# Patient Record
Sex: FEMALE | Race: WHITE | NOT HISPANIC OR LATINO | Employment: FULL TIME | URBAN - METROPOLITAN AREA
[De-identification: names, ages, dates, MRNs, and addresses within clinical notes are randomized per-mention and may not be internally consistent; named-entity substitution may affect disease eponyms.]

---

## 2017-01-05 DIAGNOSIS — E11.65 TYPE 2 DIABETES MELLITUS WITH HYPERGLYCEMIA, WITHOUT LONG-TERM CURRENT USE OF INSULIN (H): ICD-10-CM

## 2017-01-05 DIAGNOSIS — I10 HYPERTENSION GOAL BP (BLOOD PRESSURE) < 140/90: Primary | ICD-10-CM

## 2017-01-09 RX ORDER — LISINOPRIL 10 MG/1
10 TABLET ORAL DAILY
Qty: 90 TABLET | Refills: 3 | Status: SHIPPED | OUTPATIENT
Start: 2017-01-09 | End: 2017-11-13

## 2017-03-08 ENCOUNTER — OFFICE VISIT (OUTPATIENT)
Dept: FAMILY MEDICINE | Facility: CLINIC | Age: 57
End: 2017-03-08
Payer: COMMERCIAL

## 2017-03-08 ENCOUNTER — OFFICE VISIT (OUTPATIENT)
Dept: PHARMACY | Facility: CLINIC | Age: 57
End: 2017-03-08
Payer: COMMERCIAL

## 2017-03-08 VITALS
SYSTOLIC BLOOD PRESSURE: 130 MMHG | HEART RATE: 80 BPM | RESPIRATION RATE: 16 BRPM | DIASTOLIC BLOOD PRESSURE: 88 MMHG | TEMPERATURE: 98.2 F | BODY MASS INDEX: 29.58 KG/M2 | WEIGHT: 167 LBS

## 2017-03-08 VITALS
OXYGEN SATURATION: 98 % | WEIGHT: 167 LBS | SYSTOLIC BLOOD PRESSURE: 130 MMHG | HEART RATE: 93 BPM | DIASTOLIC BLOOD PRESSURE: 88 MMHG | BODY MASS INDEX: 29.58 KG/M2

## 2017-03-08 DIAGNOSIS — E78.5 HYPERLIPIDEMIA WITH TARGET LDL LESS THAN 130: ICD-10-CM

## 2017-03-08 DIAGNOSIS — E11.65 TYPE 2 DIABETES MELLITUS WITH HYPERGLYCEMIA, WITHOUT LONG-TERM CURRENT USE OF INSULIN (H): Primary | ICD-10-CM

## 2017-03-08 DIAGNOSIS — R20.9 COLD AND CLAMMY SKIN: ICD-10-CM

## 2017-03-08 DIAGNOSIS — R23.1 COLD AND CLAMMY SKIN: ICD-10-CM

## 2017-03-08 DIAGNOSIS — I10 ESSENTIAL HYPERTENSION WITH GOAL BLOOD PRESSURE LESS THAN 140/90: ICD-10-CM

## 2017-03-08 DIAGNOSIS — F43.23 ADJUSTMENT DISORDER WITH MIXED ANXIETY AND DEPRESSED MOOD: ICD-10-CM

## 2017-03-08 DIAGNOSIS — R07.0 THROAT PAIN: ICD-10-CM

## 2017-03-08 DIAGNOSIS — B34.9 VIRAL SYNDROME: Primary | ICD-10-CM

## 2017-03-08 LAB
DEPRECATED S PYO AG THROAT QL EIA: NORMAL
MICRO REPORT STATUS: NORMAL
SPECIMEN SOURCE: NORMAL

## 2017-03-08 PROCEDURE — 99213 OFFICE O/P EST LOW 20 MIN: CPT | Performed by: NURSE PRACTITIONER

## 2017-03-08 PROCEDURE — 99605 MTMS BY PHARM NP 15 MIN: CPT | Performed by: PHARMACIST

## 2017-03-08 PROCEDURE — 87081 CULTURE SCREEN ONLY: CPT | Performed by: NURSE PRACTITIONER

## 2017-03-08 PROCEDURE — 87880 STREP A ASSAY W/OPTIC: CPT | Performed by: NURSE PRACTITIONER

## 2017-03-08 PROCEDURE — 99607 MTMS BY PHARM ADDL 15 MIN: CPT | Performed by: PHARMACIST

## 2017-03-08 NOTE — LETTER
[address] [address]  [phone]      March 8, 2017      Ameena Dominguezr  [address] [facility] [address]        To whom it may concern,    Due to a medical condition, Ameena missed work 3/6-3/8/2017. She is cleared to return to work tomorrow, 3/9/2017.       Sincerely,        Diane PIERSON CNP

## 2017-03-08 NOTE — PATIENT INSTRUCTIONS
Recommendations from today's MTM visit:                                                        1. Stay on all same medications, ok to use dayqui/nyquil as needed. See martha for consult -illness today -RTW note.         Next MTM visit: Wednesday June 14th AT 10;30am --fasting labs /med review.     To schedule another MTM appointment, please call the clinic directly or you may call the MTM scheduling line at [phone] or toll-free at [phone].     My Clinical Pharmacist's contact information:                                                      It was a pleasure seeing you today!  Please feel free to contact me with any questions or concerns you have.      Yash Ornelas [facility].  Medication Therapy Management Provider  [phone]      You may receive a survey about the MTM services you received.  I would appreciate your feedback to help me serve you better in the future. Please fill it out and return it when you can. Your comments will be anonymous.      My healthcare goals:

## 2017-03-08 NOTE — MR AVS SNAPSHOT
After Visit Summary   3/8/2017    Inger Wegener    MRN: [id number]           Patient Information     Date Of Birth          1960        Visit Information        Provider Department      3/8/2017 11:30 AM Yash Ornelas RPH [facility] MTM        Care Instructions    Recommendations from today's MTM visit:                                                        1. Stay on all same medications, ok to use dayqui/nyquil as needed. See martha for consult -illness today -RTW note.         Next MTM visit: Wednesday June 14th AT 10;30am --fasting labs /med review.     To schedule another MTM appointment, please call the clinic directly or you may call the MTM scheduling line at [phone] or toll-free at [phone].     My Clinical Pharmacist's contact information:                                                      It was a pleasure seeing you today!  Please feel free to contact me with any questions or concerns you have.      Yash Ornelas Rph.  Medication Therapy Management Provider  [phone]      You may receive a survey about the MTM services you received.  I would appreciate your feedback to help me serve you better in the future. Please fill it out and return it when you can. Your comments will be anonymous.      My healthcare goals:                                                                    Follow-ups after your visit        Your next 10 appointments already scheduled     Mar 08, 2017  6:00 PM CST   Office Visit with DANGELO Verduzco CNP   [facility]    [address] [place] [id number] [phone]           Bring a current list of meds and any records pertaining to this visit.  For Physicals, please bring immunization records and any forms needing to be filled out.  Please arrive 10 minutes early to complete paperwork.            Jun 14, 2017 10:30 AM CDT   SHORT with Yash  "TORRI Ornelas   [facility] MTM ([facility])    [address] [place] [address]  [place] [address] [phone]              Who to contact     If you have questions or need follow up information about today's clinic visit or your schedule please contact [facility] directly at [phone].  Normal or non-critical lab and imaging results will be communicated to you by MyChart, letter or phone within 4 business days after the clinic has received the results. If you do not hear from us within 7 days, please contact the clinic through [employer] or phone. If you have a critical or abnormal lab result, we will notify you by phone as soon as possible.  Submit refill requests through [email or web address] or call your pharmacy and they will forward the refill request to us. Please allow 3 business days for your refill to be completed.          Additional Information About Your Visit        [employer] Information     [email or web address] lets you send messages to your doctor, view your test results, renew your prescriptions, schedule appointments and more. To sign up, go to [email or web address] . Click on \"Log in\" on the left side of the screen, which will take you to the Welcome page. Then click on \"Sign up Now\" on the right side of the page.     You will be asked to enter the access code listed below, as well as some personal information. Please follow the directions to create your username and password.     Your access code is: [email or web address]  Expires: 3/29/2017 10:29 AM     Your access code will  in 90 days. If you need help or a new code, please call your [facility] clinic or [phone].        Care EveryWhere ID     This is your Care EveryWhere ID. This could be used by other organizations to access your [facility] medical records  [id number]        Your Vitals Were     Pulse Pulse Oximetry BMI (Body Mass Index)             93 98% 29.58 kg/m2          Blood Pressure from Last 3 " Encounters:   03/08/17 130/88   12/29/16 137/85   08/30/16 142/80    Weight from Last 3 Encounters:   03/08/17 167 lb (75.8 kg)   12/29/16 170 lb (77.1 kg)   08/30/16 170 lb (77.1 kg)              Today, you had the following     No orders found for display       Primary Care Provider Office Phone # Fax #    DANGELO Verduzco [facility] [phone]       [place] [address] [place] [address]        Thank you!     Thank you for choosing [facility]  for your care. Our goal is always to provide you with excellent care. Hearing back from our patients is one way we can continue to improve our services. Please take a few minutes to complete the written survey that you may receive in the mail after your visit with us. Thank you!             Your Updated Medication List - Protect others around you: Learn how to safely use, store and throw away your medicines at www.disposemymeds.org.          This list is accurate as of: 3/8/17 12:03 PM.  Always use your most recent med list.                   Brand Name Dispense Instructions for use    ADVIL PO      Take 400 mg by mouth as needed       ASPIRIN NOT PRESCRIBED    INTENTIONAL     continuous prn for other Reported on 3/8/2017       blood glucose monitoring lancets     3 Box    Use to test blood sugar 2 times daily or as directed.  Ok to substitute alternative if insurance prefers.       blood glucose monitoring test strip    ACCU-CHEK SMARTVIEW    180 each    Use to test blood sugar 2 times daily or as directed.  Ok to substitute alternative if insurance prefers. Profile Rx: patient will contact pharmacy when needed       hydrochlorothiazide 25 MG tablet    HYDRODIURIL    90 tablet    Take 1 tablet (25 mg) by mouth daily       lisinopril 10 MG tablet    PRINIVIL/ZESTRIL    90 tablet    Take 1 tablet (10 mg) by mouth daily       metFORMIN 500 MG 24 hr tablet    GLUCOPHAGE-XR    360 tablet    Take 2 tablets  (1,000 mg) by mouth 2 times daily (with meals)       order for DME     2 Units    Equipment being ordered: Compression stocking, medium compression       simvastatin 20 MG tablet    ZOCOR    90 tablet    Take 1 tablet (20 mg) by mouth At Bedtime       venlafaxine 37.5 MG 24 hr capsule    EFFEXOR-XR    270 capsule    Take 3 capsules (112.5 mg) by mouth daily

## 2017-03-08 NOTE — NURSING NOTE
"Chief Complaint   Patient presents with     URI       Initial /88  Pulse 80  Temp 98.2  F (36.8  C) (Oral)  Resp 16  Wt 167 lb (75.8 kg)  BMI 29.58 kg/m2 Estimated body mass index is 29.58 kg/(m^2) as calculated from the following:    Height as of 12/29/16: 5' 3\" (1.6 m).    Weight as of this encounter: 167 lb (75.8 kg).  Medication Reconciliation: complete       Brooklyn Ying MA     "

## 2017-03-08 NOTE — PROGRESS NOTES
SUBJECTIVE/OBJECTIVE:                                                    Inger Wegener is a 56 year old female coming in for a follow-up visit (16) for Medication Therapy Management.  She was referred to me from Diane, Her PCP.     Chief Complaint:  Has a monster head cold/cough -3 + days now --it feels different - lightheaded --scared her - felt clumsy .    Her question is what otc meds she can take ?  She is taking coricidin , dayquil and nyquil tabs --helps but seems to take longer. She is concerned she lost her appetite.     3 days off work now --she needs a doctor's note to rtw.       Personal Healthcare Goals: Control diabetes with the least amount of medications possible.     Allergies/ADRs: Reviewed in Epic. 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 Had gestational diabetes with her last child.     Medication Adherence: no issues reported by patient    Diabetes:  Pt currently taking Metformin 500 mg er tablets-2 twice a day.  Pt is not experiencing side effects. Had diarrhea when starting metformin but that has resolved.   Patient has a fear of needles. If we want to start considering an injectable, she would like something like trulicity -she fears needles.  Feel weakness before eating.   SMB days =139 n=3, 14 days =126 n=5, 30 days =131 n=7    Date FBG/ 2hours post Lunch/2hours post Dinner /2hours post    3--17  120 346pm     3-6  138 301pm 158 853pm           2-26   137 10pm    2-25  77 2139pm     2-18  93 353pm     2-14   193 10pm -valentines candy              Symptoms of low blood sugar? shaky, dizzy, weak, sweaty. Frequency of hypoglycemia? 1-2 times per week.  Recent symptoms of high blood sugar? none  Eye exam:  due. Last exam was over 1 year ago.   Foot exam: due  Microalbumin is < 30 mg/g. Pt is not taking an ACEi/ARB.  Aspirin: Not taking  Diet/Exercise: Walk her dog every other day, go on bike rides 10 blocks 2 times weely, garden, mow the lawn. Working on eating more protein. Has reduced sugar and carbohydrate intake drastically. Patient has one treat each night of a laughing cow ice cream sandwich (30 grams) at bedtime.       Hypertension. Patient currently taking hydrochlorothiazide 25 mg daily + lisinopril 10mg./day . Pt does not monitor blood pressure at home.     Hyperlipidemia: Current therapy includes simvastatin 20 mg once daily.  Pt reports no significant myalgias or other side effects.   The 10-year ASCVD risk score (Mary Annpeter GARCIA Jr., et al., 2013) is: 6.2%    Values used to calculate the score:      Age: 56 years      Sex: Female      Is Non- : No      Diabetic: Yes      Tobacco smoker: No      Systolic Blood Pressure: 137 mmHg      Is Prescribed Antihypertensives: Yes      HDL Cholesterol: 47 mg/dL      Total Cholesterol: 168 mg/dL    Depression: patient currently taking Effexor 37.5 mg 24 hr capsule- Takes 3 a day=112.5mg..  Doing well.     Headcold/virus?:   Pt. Doesn't feel well last 72 hrs. --feels different --she is worried about taking otc like dayquil and nyquil -- but mtm review her BP and bs's --all wnl today .    She wants a rtw note -- mtm cannot provide that today --she needs nasal swab to rule out flu and possible throat swab to rule out strep .    mtm spoke with her pcp --she will fit her in for appt. At noon today .         Current labs include:  BP Readings from Last 3 Encounters:   03/08/17 130/88   12/29/16 137/85   08/30/16 142/80     Lab Results   Component Value Date    A1C 6.6 12/29/2016    A1C 7.6 05/13/2016    A1C 6.7 09/29/2015    A1C 6.9 04/10/2015    A1C 7.8 01/07/2015     Cholesterol   Date Value Ref Range Status   12/29/2016 168 <200 mg/dL Final   09/29/2015  200 (H) <200 mg/dL Final     Comment:     LDL Cholesterol is the primary guide to therapy.   The NCEP recommends further evaluation of: patients with cholesterol greater   than 200 mg/dL if additional risk factors are present, cholesterol greater   than   240 mg/dL, triglycerides greater than 150 mg/dL, or HDL less than 40 mg/dL.       HDL Cholesterol   Date Value Ref Range Status   12/29/2016 47 (L) >49 mg/dL Final   09/29/2015 49 (L) >50 mg/dL Final     LDL Cholesterol Calculated   Date Value Ref Range Status   12/29/2016 91 <100 mg/dL Final     Comment:     Desirable:       <100 mg/dl   09/29/2015 107 0 - 129 mg/dL Final     Comment:     LDL Cholesterol is the primary guide to therapy: LDL-cholesterol goal in high   risk patients is <100 mg/dL and in very high risk patients is <70 mg/dL.       Triglycerides   Date Value Ref Range Status   12/29/2016 150 (H) <150 mg/dL Final     Comment:     Borderline high:  150-199 mg/dl   High:             200-499 mg/dl   Very high:       >499 mg/dl     09/29/2015 219 (H) 0 - 150 mg/dL Final     Cholesterol/HDL Ratio   Date Value Ref Range Status   09/29/2015 4.1 0.0 - 5.0 Final   09/03/2014 3.7 0.0 - 5.0 Final         Liver Function Studies -   Recent Labs   Lab Test  09/29/15   0748   PROTTOTAL  7.4   ALBUMIN  4.1   BILITOTAL  0.5   ALKPHOS  95   AST  19   ALT  44       Lab Results   Component Value Date    MICROL 7 12/29/2016     No results found for: MICROALBUMIN  Lab Results   Component Value Date    ALBUMIN 4.6 12/29/2016         Last Basic Metabolic Panel:  Lab Results   Component Value Date     12/29/2016      Lab Results   Component Value Date    POTASSIUM 3.4 12/29/2016     Lab Results   Component Value Date    CHLORIDE 100 12/29/2016     Lab Results   Component Value Date    KACY 9.4 12/29/2016     Lab Results   Component Value Date    CO2 28 12/29/2016     Lab Results   Component Value Date    BUN 12 12/29/2016     Lab Results   Component Value Date    CR 0.73  12/29/2016     Lab Results   Component Value Date     12/29/2016       GFR Estimate   Date Value Ref Range Status   12/29/2016 82 >60 mL/min/1.7m2 Final     Comment:     Non  GFR Calc   09/29/2015 75 >60 mL/min/1.7m2 Final     Comment:     Non  GFR Calc   09/10/2014 84 >60 mL/min/1.7m2 Final     Comment:     Non  GFR Calc     GFR Estimate If Black   Date Value Ref Range Status   12/29/2016 >90   GFR Calc   >60 mL/min/1.7m2 Final   09/29/2015 >90   GFR Calc   >60 mL/min/1.7m2 Final   09/10/2014 >90   GFR Calc   >60 mL/min/1.7m2 Final     TSH   Date Value Ref Range Status   05/13/2016 4.11 (H) 0.40 - 4.00 mU/L Final   ]  /88  Pulse 93  Wt 167 lb (75.8 kg)  SpO2 98%  BMI 29.58 kg/m2    Most Recent Immunizations   Administered Date(s) Administered     Influenza Vaccine IM 3yrs+ 4 Valent IIV4 10/01/2016     Influenza Vaccine, 3 YRS +, IM (QUADRIVALENT W/PRESERVATIVES) 09/24/2015     TDAP (ADACEL AGES 11-64) 11/13/2013     Twinrix A/B 12/29/2016     ASSESSMENT:                                                       Current medications were reviewed with her today.     Medication Adherence: no issues identified    Diabetes: Stable. Patient is meeting A1c goal of < 7%.      Hypertension: Stable. Patient is meeting BP goal of < 140/90mmHg.     Depression: Stable    Hyperlipidemia: Stable. Pt is on moderate intensity statin which is indicated based on 2013 ACC/AHA guidelines for lipid management.      Head/cold/virus?:    MTM feels she is ok to take all rx meds and her current otc cough/cold meds as is --also arranged for her to see PCP now today to rule out serious illness/ possible note then to ertw as a  tomorrow.     PLAN:                                                        1. Stay on all same medications, ok to use dayqui/nyquil as needed. See martha for consult -illness today -RTW note.          Next MTM visit: Wednesday June 14th AT 10;30am --fasting labs /med review.     I spent 25 minutes with this patient today.     My Clinical Pharmacist's contact information:                                                      It was a pleasure seeing you today!  Please feel free to contact me with any questions or concerns you have.      Yash Ornelas Rph.  Medication Therapy Management Provider  [phone]      You may receive a survey about the MTM services you received.  I would appreciate your feedback to help me serve you better in the future. Please fill it out and return it when you can. Your comments will be anonymous.

## 2017-03-08 NOTE — MR AVS SNAPSHOT
"              After Visit Summary   3/8/2017    Inger Wegener    MRN: [id number]           Patient Information     Date Of Birth          1960        Visit Information        Provider Department      3/8/2017 6:00 PM Diane Diaz APRN CNP [facility]        Today's Diagnoses     Viral syndrome    -  1    Throat pain           Follow-ups after your visit        Your next 10 appointments already scheduled     Jun 14, 2017 10:30 AM CDT   SHORT with Yash Ornelas RPH   [facility] ([facility])    [address] [place] [id number] [phone]              Who to contact     If you have questions or need follow up information about today's clinic visit or your schedule please contact [facility] directly at [phone].  Normal or non-critical lab and imaging results will be communicated to you by MyChart, letter or phone within 4 business days after the clinic has received the results. If you do not hear from us within 7 days, please contact the clinic through MyChart or phone. If you have a critical or abnormal lab result, we will notify you by phone as soon as possible.  Submit refill requests through [employer] or call your pharmacy and they will forward the refill request to us. Please allow 3 business days for your refill to be completed.          Additional Information About Your Visit        MyChart Information     [employer] lets you send messages to your doctor, view your test results, renew your prescriptions, schedule appointments and more. To sign up, go to [email or web address] . Click on \"Log in\" on the left side of the screen, which will take you to the Welcome page. Then click on \"Sign up Now\" on the right side of the page.     You will be asked to enter the access code listed below, as well as some personal information. Please follow the directions to create your username and password.   "   Your access code is: [email or web address]  Expires: 3/29/2017 11:29 AM     Your access code will  in 90 days. If you need help or a new code, please call your [facility] clinic or [phone].        Care EveryWhere ID     This is your Care EveryWhere ID. This could be used by other organizations to access your [facility] medical records  [id number]        Your Vitals Were     Pulse Temperature Respirations BMI (Body Mass Index)          80 98.2  F (36.8  C) (Oral) 16 29.58 kg/m2         Blood Pressure from Last 3 Encounters:   17 130/88   17 130/88   16 137/85    Weight from Last 3 Encounters:   17 167 lb (75.8 kg)   17 167 lb (75.8 kg)   16 170 lb (77.1 kg)              We Performed the Following     Beta strep group A culture     Rapid strep screen        Primary Care Provider Office Phone # Fax #    DANGELO Verduzco [facility] [phone]       [facility] [address] [place] [address]        Thank you!     Thank you for choosing [facility]  for your care. Our goal is always to provide you with excellent care. Hearing back from our patients is one way we can continue to improve our services. Please take a few minutes to complete the written survey that you may receive in the mail after your visit with us. Thank you!             Your Updated Medication List - Protect others around you: Learn how to safely use, store and throw away your medicines at www.disposemymeds.org.          This list is accurate as of: 3/8/17 11:59 PM.  Always use your most recent med list.                   Brand Name Dispense Instructions for use    ADVIL PO      Take 400 mg by mouth as needed       ASPIRIN NOT PRESCRIBED    INTENTIONAL     continuous prn for other Reported on 3/8/2017       blood glucose monitoring lancets     3 Box    Use to test blood sugar 2 times daily or as directed.  Ok to substitute alternative if insurance prefers.        blood glucose monitoring test strip    ACCU-CHEK SMARTVIEW    180 each    Use to test blood sugar 2 times daily or as directed.  Ok to substitute alternative if insurance prefers. Profile Rx: patient will contact pharmacy when needed       hydrochlorothiazide 25 MG tablet    HYDRODIURIL    90 tablet    Take 1 tablet (25 mg) by mouth daily       lisinopril 10 MG tablet    PRINIVIL/ZESTRIL    90 tablet    Take 1 tablet (10 mg) by mouth daily       metFORMIN 500 MG 24 hr tablet    GLUCOPHAGE-XR    360 tablet    Take 2 tablets (1,000 mg) by mouth 2 times daily (with meals)       order for DME     2 Units    Equipment being ordered: Compression stocking, medium compression       simvastatin 20 MG tablet    ZOCOR    90 tablet    Take 1 tablet (20 mg) by mouth At Bedtime       venlafaxine 37.5 MG 24 hr capsule    EFFEXOR-XR    270 capsule    Take 3 capsules (112.5 mg) by mouth daily

## 2017-03-08 NOTE — PROGRESS NOTES
SUBJECTIVE:                                                    Inger Wegener is a 56 year old female who presents to clinic today for the following health issues:      Acute Illness   Acute illness concerns: Cold and coughing. Sore throat. Energy level in the pits. Body aches.   Onset: 4 days     Fever: no    Chills/Sweats: no    Headache (location?): YES    Sinus Pressure:YES    Conjunctivitis:  no    Ear Pain: no    Rhinorrhea: YES    Congestion: YES    Sore Throat: YES     Cough: YES-productive of yellow sputum    Wheeze: no    Decreased Appetite: YES    Nausea: YES    Vomiting: no    Diarrhea:  no    Dysuria/Freq.: no    Fatigue/Achiness: YES    Sick/Strep Exposure: no     Therapies Tried and outcome: Day quil, Night quil and Tylenol       Problem list and histories reviewed & adjusted, as indicated.  Additional history: as documented    Patient Active Problem List   Diagnosis     CARDIOVASCULAR SCREENING; LDL GOAL LESS THAN 160     Hypertension goal BP (blood pressure) < 140/90     Flying phobia     Hyperlipidemia with target LDL less than 130     Adjustment reaction     Abnormal glucose     Adjustment disorder with mixed anxiety and depressed mood     Closed nondisplaced dome fracture of right talus, sequela     Ankle pain, right     Type 2 diabetes mellitus with hyperglycemia (H)     History reviewed. No pertinent past surgical history.    Social History   Substance Use Topics     Smoking status: Passive Smoke Exposure - Never Smoker     Smokeless tobacco: Never Used     Alcohol use Yes     Family History   Problem Relation Age of Onset     Eye Disorder Mother      Hypertension Mother      Thyroid Disease Mother      Hearing Loss Father      DIABETES Maternal Grandmother          Current Outpatient Prescriptions   Medication Sig Dispense Refill     lisinopril (PRINIVIL/ZESTRIL) 10 MG tablet Take 1 tablet (10 mg) by mouth daily 90 tablet 3     blood glucose monitoring (ACCU-CHEK SMARTVIEW) test strip Use to  test blood sugar 2 times daily or as directed.  Ok to substitute alternative if insurance prefers.  Profile Rx: patient will contact pharmacy when needed 180 each 1     venlafaxine (EFFEXOR-XR) 37.5 MG 24 hr capsule Take 3 capsules (112.5 mg) by mouth daily 270 capsule 3     simvastatin (ZOCOR) 20 MG tablet Take 1 tablet (20 mg) by mouth At Bedtime 90 tablet 3     hydrochlorothiazide (HYDRODIURIL) 25 MG tablet Take 1 tablet (25 mg) by mouth daily 90 tablet 3     metFORMIN (GLUCOPHAGE-XR) 500 MG 24 hr tablet Take 2 tablets (1,000 mg) by mouth 2 times daily (with meals) 360 tablet 3     blood glucose monitoring (ACCU-CHEK FASTCLIX) lancets Use to test blood sugar 2 times daily or as directed.  Ok to substitute alternative if insurance prefers. 3 Box 3     order for DME Equipment being ordered: Compression stocking, medium compression 2 Units 11     ASPIRIN NOT PRESCRIBED (INTENTIONAL) continuous prn for other Reported on 3/8/2017       Ibuprofen (ADVIL PO) Take 400 mg by mouth as needed        Allergies   Allergen Reactions     Erythromycin Nausea     Recent Labs   Lab Test  12/29/16   1044  05/13/16   1551  09/29/15   0748   09/03/14   0800   A1C  6.6*  7.6*  6.7*   < >   --    LDL  91   --   107   --   89   HDL  47*   --   49*   --   47*   TRIG  150*   --   219*   --   190*   ALT  38   --   44   --   48   CR  0.73   --   0.80   < >   --    GFRESTIMATED  82   --   75   < >   --    GFRESTBLACK  >90   GFR Calc     --   >90   GFR Calc     < >   --    POTASSIUM  3.4   --   3.4   < >   --    TSH   --   4.11*   --    --    --     < > = values in this interval not displayed.      BP Readings from Last 3 Encounters:   03/08/17 130/88   03/08/17 130/88   12/29/16 137/85    Wt Readings from Last 3 Encounters:   03/08/17 167 lb (75.8 kg)   03/08/17 167 lb (75.8 kg)   12/29/16 170 lb (77.1 kg)                  Labs reviewed in EPIC    Reviewed and updated as needed this visit by clinical staff        Reviewed and updated as needed this visit by Provider         ROS:  Constitutional, HEENT, cardiovascular, pulmonary, gi and gu systems are negative, except as otherwise noted.    OBJECTIVE:                                                    /88  Pulse 80  Temp 98.2  F (36.8  C) (Oral)  Resp 16  Wt 167 lb (75.8 kg)  BMI 29.58 kg/m2  Body mass index is 29.58 kg/(m^2).  EXAM:  Constitutional: healthy, alert, active and moderate distress, ill  Neck: Neck supple. + adenopathy.  ENT: Bilateral TM's are normal.  Posterior oropharynx is mildly erythemetous.  Nares clear without congestion.  Cardiovascular: negative, PMI normal. No lifts, heaves, or thrills. RRR. No murmurs, clicks gallops or rub, No edema or JVD.  Respiratory: Respirations easy and regular. No respiratory distress. Lungs sounds CTA.  Skin: warm and dry  Psychiatric: mentation appears normal. and affect normal/bright but ill.    Results for orders placed or performed in visit on 03/08/17   Rapid strep screen   Result Value Ref Range    Specimen Description Throat     Rapid Strep A Screen       NEGATIVE: No Group A streptococcal antigen detected by immunoassay, await   culture report.      Micro Report Status FINAL 03/08/2017    Beta strep group A culture   Result Value Ref Range    Specimen Description Throat     Culture Micro No Beta Streptococcus isolated     Micro Report Status FINAL 03/10/2017         ASSESSMENT/PLAN:                                                      (B34.9) Viral syndrome  (primary encounter diagnosis)  Comment:   Plan: the patient was reassured that her RST is negative.  I believe this is a viral illness.  She was appreciative.  We discussed self cares (fluids, diet, rest, etc).  She will return with new or worsening symptoms.    (R07.0) Throat pain  Comment:   Plan: Rapid strep screen, Beta strep group A culture           DANGELO Lockwood [facility]

## 2017-03-10 LAB
BACTERIA SPEC CULT: NORMAL
MICRO REPORT STATUS: NORMAL
SPECIMEN SOURCE: NORMAL

## 2017-03-13 ENCOUNTER — TRANSFERRED RECORDS (OUTPATIENT)
Dept: HEALTH INFORMATION MANAGEMENT | Facility: CLINIC | Age: 57
End: 2017-03-13

## 2017-06-20 ENCOUNTER — OFFICE VISIT (OUTPATIENT)
Dept: PHARMACY | Facility: CLINIC | Age: 57
End: 2017-06-20
Payer: COMMERCIAL

## 2017-06-20 VITALS
WEIGHT: 169.4 LBS | DIASTOLIC BLOOD PRESSURE: 76 MMHG | BODY MASS INDEX: 30.01 KG/M2 | HEART RATE: 76 BPM | SYSTOLIC BLOOD PRESSURE: 120 MMHG

## 2017-06-20 DIAGNOSIS — E11.65 TYPE 2 DIABETES MELLITUS WITH HYPERGLYCEMIA, WITHOUT LONG-TERM CURRENT USE OF INSULIN (H): Primary | ICD-10-CM

## 2017-06-20 DIAGNOSIS — E78.5 HYPERLIPIDEMIA WITH TARGET LDL LESS THAN 130: ICD-10-CM

## 2017-06-20 DIAGNOSIS — F43.23 ADJUSTMENT DISORDER WITH MIXED ANXIETY AND DEPRESSED MOOD: ICD-10-CM

## 2017-06-20 DIAGNOSIS — E55.9 VITAMIN D DEFICIENCY: ICD-10-CM

## 2017-06-20 DIAGNOSIS — E53.8 VITAMIN B12 DEFICIENCY (NON ANEMIC): ICD-10-CM

## 2017-06-20 DIAGNOSIS — E11.65 TYPE 2 DIABETES MELLITUS WITH HYPERGLYCEMIA, WITHOUT LONG-TERM CURRENT USE OF INSULIN (H): ICD-10-CM

## 2017-06-20 DIAGNOSIS — I10 ESSENTIAL HYPERTENSION WITH GOAL BLOOD PRESSURE LESS THAN 140/90: ICD-10-CM

## 2017-06-20 LAB
CHOLEST SERPL-MCNC: 194 MG/DL
DEPRECATED CALCIDIOL+CALCIFEROL SERPL-MC: 35 UG/L (ref 20–75)
HBA1C MFR BLD: 6.7 % (ref 4.3–6)
HDLC SERPL-MCNC: 48 MG/DL
LDLC SERPL CALC-MCNC: 114 MG/DL
NONHDLC SERPL-MCNC: 146 MG/DL
TRIGL SERPL-MCNC: 158 MG/DL
TSH SERPL DL<=0.005 MIU/L-ACNC: 2.97 MU/L (ref 0.4–4)
VIT B12 SERPL-MCNC: 340 PG/ML (ref 193–986)

## 2017-06-20 PROCEDURE — 36415 COLL VENOUS BLD VENIPUNCTURE: CPT | Performed by: NURSE PRACTITIONER

## 2017-06-20 PROCEDURE — 83036 HEMOGLOBIN GLYCOSYLATED A1C: CPT | Performed by: NURSE PRACTITIONER

## 2017-06-20 PROCEDURE — 80061 LIPID PANEL: CPT | Performed by: NURSE PRACTITIONER

## 2017-06-20 PROCEDURE — 84443 ASSAY THYROID STIM HORMONE: CPT | Performed by: NURSE PRACTITIONER

## 2017-06-20 PROCEDURE — 82607 VITAMIN B-12: CPT | Performed by: NURSE PRACTITIONER

## 2017-06-20 PROCEDURE — 99607 MTMS BY PHARM ADDL 15 MIN: CPT | Performed by: PHARMACIST

## 2017-06-20 PROCEDURE — 99606 MTMS BY PHARM EST 15 MIN: CPT | Performed by: PHARMACIST

## 2017-06-20 PROCEDURE — 82306 VITAMIN D 25 HYDROXY: CPT | Performed by: NURSE PRACTITIONER

## 2017-06-20 NOTE — PROGRESS NOTES
SUBJECTIVE/OBJECTIVE:                                                    Inger Wegener is a 57 year old female coming in for a follow-up visit (3-8-17) for Medication Therapy Management.  She was referred to me from Diane, Her PCP.     Chief Complaint: Here for fasting labs.       Personal Healthcare Goals: Control diabetes with the least amount of medications possible.     Allergies/ADRs: Reviewed in Epic. 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 Had gestational diabetes with her last child.     Medication Adherence: no issues reported by patient    Diabetes:  Pt currently taking Metformin 500 mg er tablets-2 twice a day.  Pt is not experiencing side effects. Had diarrhea when starting metformin but that has resolved.   Patient has a fear of needles. If we want to start considering an injectable, she would like something like trulicity -she fears needles.  Feel weakness before eating.   SMBG: no bs meter today - 3 x week , self reports --am fasting s= 130-160, bedtime - 110-190 .       Symptoms of low blood sugar? shaky, dizzy, weak, sweaty. Frequency of hypoglycemia? 1-2 times per week.  Recent symptoms of high blood sugar? none  Eye exam: 3-13-17--had eye exam for dm --all good .   Foot exam: due  Microalbumin is < 30 mg/g. Pt is not taking an ACEi/ARB.  Aspirin: Not taking  Diet/Exercise: Walk her dog every other day, go on bike rides 10 blocks 2 times weely, garden, mow the lawn. Working on eating more protein. Has reduced sugar and carbohydrate intake drastically. Patient has one treat each night of a laughing cow ice cream sandwich (30 grams) at bedtime.       Hypertension. Patient currently taking hydrochlorothiazide 25 mg daily  + lisinopril 10mg./day . Pt does not monitor blood pressure at home.     Hyperlipidemia: Current therapy includes simvastatin 20 mg once daily.  Pt reports no significant myalgias or other side effects.   The 10-year ASCVD risk score (Bloomerypeter GARCIA Jr, et al., 2013) is: 5.3%    Values used to calculate the score:      Age: 57 years      Sex: Female      Is Non- : No      Diabetic: Yes      Tobacco smoker: No      Systolic Blood Pressure: 120 mmHg      Is BP treated: Yes      HDL Cholesterol: 47 mg/dL      Total Cholesterol: 168 mg/dL     Repeat fasting lipids today .    Depression: patient currently taking Effexor 37.5 mg 24 hr capsule- Takes 3 a day=112.5mg..  Doing well.     FYI--Last tsh 4.11--will repeat yearly lab today --she has no hypothyroid symptoms--we discussed what sub clinical hypothyroidism is and will wait for lab result . She does not want any more medication if not warranted.     Vitamin D3: level was 35 on today .    Vitamin B12: level was 390 on today. Vitamin B12 helps support memory and lessens fatigue.            Current labs include:  BP Readings from Last 3 Encounters:   06/20/17 120/76   03/08/17 130/88   03/08/17 130/88     Today's Vitals: /76  Pulse 76  Wt 169 lb 6.4 oz (76.8 kg)  BMI 30.01 kg/m2  Lab Results   Component Value Date    A1C 6.7 06/20/2017   .  Lab Results   Component Value Date    CHOL 194 06/20/2017     Lab Results   Component Value Date    TRIG 158 06/20/2017     Lab Results   Component Value Date    HDL 48 06/20/2017     Lab Results   Component Value Date     06/20/2017       Liver Function Studies -   Recent Labs   Lab Test  12/29/16   1044   PROTTOTAL  7.5   ALBUMIN  4.6   BILITOTAL  0.4   ALKPHOS  87   AST  17   ALT  38       Lab Results   Component Value Date    UCRR 68 12/29/2016    MICROL 7 12/29/2016    UMALCR 9.82 12/29/2016       Last Basic Metabolic Panel:  Lab Results   Component Value Date     12/29/2016      Lab Results    Component Value Date    POTASSIUM 3.4 12/29/2016     Lab Results   Component Value Date    CHLORIDE 100 12/29/2016     Lab Results   Component Value Date    BUN 12 12/29/2016     Lab Results   Component Value Date    CR 0.73 12/29/2016     GFR Estimate   Date Value Ref Range Status   12/29/2016 82 >60 mL/min/1.7m2 Final     Comment:     Non  GFR Calc   09/29/2015 75 >60 mL/min/1.7m2 Final     Comment:     Non  GFR Calc   09/10/2014 84 >60 mL/min/1.7m2 Final     Comment:     Non  GFR Calc     GFR Estimate If Black   Date Value Ref Range Status   12/29/2016 >90   GFR Calc   >60 mL/min/1.7m2 Final   09/29/2015 >90   GFR Calc   >60 mL/min/1.7m2 Final   09/10/2014 >90   GFR Calc   >60 mL/min/1.7m2 Final     TSH   Date Value Ref Range Status   06/20/2017 2.97 0.40 - 4.00 mU/L Final   ]    Most Recent Immunizations   Administered Date(s) Administered     Influenza Vaccine IM 3yrs+ 4 Valent IIV4 10/01/2016     Influenza Vaccine, 3 YRS +, IM (QUADRIVALENT W/PRESERVATIVES) 09/24/2015     TDAP Vaccine (Adacel) 11/13/2013     Twinrix A/B 12/29/2016               ASSESSMENT:                                                       Current medications were reviewed with her today.     Medication Adherence: no issues identified    Diabetes: Stable. Patient is meeting A1c goal of < 7%.      Hypertension: Stable. Patient is meeting BP goal of < 140/90mmHg.     Depression: Stable    Hyperlipidemia: Stable. Pt is on moderate intensity statin which is indicated based on 2013 ACC/AHA guidelines for lipid management.      Vitamin D3: is not at goal of 50-75ng/mL. Pt would benefit from get sunshine this summer --repeat lab in December.  Vitamin B12: is not at goal of 600-1000pg/mL. Pt would benefit from vitamin B12 lab recheck in 6 months.        PLAN:                                                      1. A1c today = 6.7% --excellent !   Keep  up good work --watch carbs in th diet , stay active .    Other lab results --I will mail you a letter .        Next MTM visit: 6 months -Tuesday December 19th at 4 pm.  Bring blood sugar meter .       I spent 25 minutes with this patient today.     My Clinical Pharmacist's contact information:                                                      It was a pleasure seeing you today!  Please feel free to contact me with any questions or concerns you have.      Yash Ornelas Rph.  Medication Therapy Management Provider  [phone]      You may receive a survey about the [facility] services you received.  I would appreciate your feedback to help me serve you better in the future. Please fill it out and return it when you can. Your comments will be anonymous.

## 2017-06-20 NOTE — PATIENT INSTRUCTIONS
Recommendations from today's MTM visit:                                                        1. A1c today = 6.7% --excellent !   Keep up good work --watch carbs in th diet , stay active .    Other lab results --I will mail you a letter .        Next MTM visit: 6 months -Tuesday December 19th at 4 pm.  Bring blood sugar meter .     To schedule another MTM appointment, please call the clinic directly or you may call the MTM scheduling line at [phone] or toll-free at [phone].     My Clinical Pharmacist's contact information:                                                      It was a pleasure seeing you today!  Please feel free to contact me with any questions or concerns you have.      Yash Ornelas [facility].  Medication Therapy Management Provider  [phone]      You may receive a survey about the MTM services you received.  I would appreciate your feedback to help me serve you better in the future. Please fill it out and return it when you can. Your comments will be anonymous.      My healthcare goals:

## 2017-06-20 NOTE — MR AVS SNAPSHOT
After Visit Summary   6/20/2017    Inger Wegener    MRN: [id number]           Patient Information     Date Of Birth          1960        Visit Information        Provider Department      6/20/2017 9:30 AM Yash Ornelas RPH [facility] MTM        Today's Diagnoses     Type 2 diabetes mellitus with hyperglycemia, without long-term current use of insulin (H)    -  1    Hyperlipidemia with target LDL less than 130        Vitamin D deficiency        Vitamin B12 deficiency (non anemic)          Care Instructions    Recommendations from today's MTM visit:                                                        1. A1c today = 6.7% --excellent !   Keep up good work --watch carbs in th diet , stay active .    Other lab results --I will mail you a letter .        Next MTM visit: 6 months -Tuesday December 19th at 4 pm.  Bring blood sugar meter .     To schedule another MT appointment, please call the clinic directly or you may call the MTM scheduling line at [phone] or toll-free at [phone].     My Clinical Pharmacist's contact information:                                                      It was a pleasure seeing you today!  Please feel free to contact me with any questions or concerns you have.      Yash Ornelas Rph.  Medication Therapy Management Provider  [phone]      You may receive a survey about the MT services you received.  I would appreciate your feedback to help me serve you better in the future. Please fill it out and return it when you can. Your comments will be anonymous.      My healthcare goals:                                                                      Follow-ups after your visit        Your next 10 appointments already scheduled     Jun 20, 2017 11:00 AM CDT   LAB with [facility]   [facility] ([facility])    [address] [place] [id number] [phone]           Patient must bring picture  "ID.  Patient should be prepared to give a urine specimen  Please do not eat 10-12 hours before your appointment if you are coming in fasting for labs on lipids, cholesterol, or glucose (sugar).  Pregnant women should follow their Care Team instructions. Water with medications is okay. Do not drink coffee or other fluids.   If you have concerns about taking  your medications, please ask at office or if scheduling via [facility], send a message by clicking on Secure Messaging, Message Your Care Team.            Dec 19, 2017  4:00 PM CST   SHORT with Yash Ornelas [facility] ([facility])    [address] [place] [address] [phone]              Who to contact     If you have questions or need follow up information about today's clinic visit or your schedule please contact [facility] directly at [phone].  Normal or non-critical lab and imaging results will be communicated to you by MyChart, letter or phone within 4 business days after the clinic has received the results. If you do not hear from us within 7 days, please contact the clinic through [facility] or phone. If you have a critical or abnormal lab result, we will notify you by phone as soon as possible.  Submit refill requests through [facility] or call your pharmacy and they will forward the refill request to us. Please allow 3 business days for your refill to be completed.          Additional Information About Your Visit        myBarrister Information     myBarrister lets you send messages to your doctor, view your test results, renew your prescriptions, schedule appointments and more. To sign up, go to [email or web address] . Click on \"Log in\" on the left side of the screen, which will take you to the Welcome page. Then click on \"Sign up Now\" on the right side of the page.     You will be asked to enter the access code listed below, as well as some personal information. " Please follow the directions to create your username and password.     Your access code is: [email or web address]  Expires: 2017 10:14 AM     Your access code will  in 90 days. If you need help or a new code, please call your [facility] clinic or [phone].        Care EveryWhere ID     This is your Care EveryWhere ID. This could be used by other organizations to access your [facility] medical records  [id number]        Your Vitals Were     Pulse BMI (Body Mass Index)                76 30.01 kg/m2           Blood Pressure from Last 3 Encounters:   17 120/76   17 130/88   17 130/88    Weight from Last 3 Encounters:   17 169 lb 6.4 oz (76.8 kg)   17 167 lb (75.8 kg)   17 167 lb (75.8 kg)               Primary Care Provider Office Phone # Fax #    DANGELO Verduzco [facility] [phone]       [facility] [address] [place] [address]        Thank you!     Thank you for choosing [facility] [place]note 2[place]  for your care. Our goal is always to provide you with excellent care. Hearing back from our patients is one way we can continue to improve our services. Please take a few minutes to complete the written survey that you may receive in the mail after your visit with us. Thank you!             Your Updated Medication List - Protect others around you: Learn how to safely use, store and throw away your medicines at www.disposemymeds.org.          This list is accurate as of: 17 10:14 AM.  Always use your most recent med list.                   Brand Name Dispense Instructions for use    ADVIL PO      Take 400 mg by mouth as needed       ASPIRIN NOT PRESCRIBED    INTENTIONAL     continuous prn for other Reported on 3/8/2017       blood glucose monitoring lancets     3 Box    Use to test blood sugar 2 times daily or as directed.  Ok to substitute alternative if insurance prefers.       blood glucose monitoring test strip     ACCU-CHEK SMARTVIEW    180 each    Use to test blood sugar 2 times daily or as directed.  Ok to substitute alternative if insurance prefers. Profile Rx: patient will contact pharmacy when needed       hydrochlorothiazide 25 MG tablet    HYDRODIURIL    90 tablet    Take 1 tablet (25 mg) by mouth daily       lisinopril 10 MG tablet    PRINIVIL/ZESTRIL    90 tablet    Take 1 tablet (10 mg) by mouth daily       metFORMIN 500 MG 24 hr tablet    GLUCOPHAGE-XR    360 tablet    Take 2 tablets (1,000 mg) by mouth 2 times daily (with meals)       order for DME     2 Units    Equipment being ordered: Compression stocking, medium compression       simvastatin 20 MG tablet    ZOCOR    90 tablet    Take 1 tablet (20 mg) by mouth At Bedtime       venlafaxine 37.5 MG 24 hr capsule    EFFEXOR-XR    270 capsule    Take 3 capsules (112.5 mg) by mouth daily

## 2017-06-27 DIAGNOSIS — E11.65 TYPE 2 DIABETES MELLITUS WITH HYPERGLYCEMIA (H): ICD-10-CM

## 2017-06-28 NOTE — TELEPHONE ENCOUNTER
Medication Detail      Disp Refills Start End RUFINO   metFORMIN (GLUCOPHAGE-XR) 500 MG 24 hr tablet 360 tablet 3 12/29/2016  No   Sig: Take 2 tablets (1,000 mg) by mouth 2 times daily (with meals)               Last Office Visit with G, P or [facility] prescribing provider:  3/8/2017       BP Readings from Last 3 Encounters:   06/20/17 120/76   03/08/17 130/88   03/08/17 130/88     Lab Results   Component Value Date    MICROL 7 12/29/2016     Lab Results   Component Value Date    UMALCR 9.82 12/29/2016     Creatinine   Date Value Ref Range Status   12/29/2016 0.73 0.52 - 1.04 mg/dL Final   ]  GFR Estimate   Date Value Ref Range Status   12/29/2016 82 >60 mL/min/1.7m2 Final     Comment:     Non  GFR Calc   09/29/2015 75 >60 mL/min/1.7m2 Final     Comment:     Non  GFR Calc   09/10/2014 84 >60 mL/min/1.7m2 Final     Comment:     Non  GFR Calc     GFR Estimate If Black   Date Value Ref Range Status   12/29/2016 >90   GFR Calc   >60 mL/min/1.7m2 Final   09/29/2015 >90   GFR Calc   >60 mL/min/1.7m2 Final   09/10/2014 >90   GFR Calc   >60 mL/min/1.7m2 Final     Lab Results   Component Value Date    CHOL 194 06/20/2017     Lab Results   Component Value Date    HDL 48 06/20/2017     Lab Results   Component Value Date     06/20/2017     Lab Results   Component Value Date    TRIG 158 06/20/2017     Lab Results   Component Value Date    CHOLHDLRATIO 4.1 09/29/2015     Lab Results   Component Value Date    AST 17 12/29/2016     Lab Results   Component Value Date    ALT 38 12/29/2016     Lab Results   Component Value Date    A1C 6.7 06/20/2017    A1C 6.6 12/29/2016    A1C 7.6 05/13/2016    A1C 6.7 09/29/2015    A1C 6.9 04/10/2015     Potassium   Date Value Ref Range Status   12/29/2016 3.4 3.4 - 5.3 mmol/L Final

## 2017-06-29 RX ORDER — METFORMIN HCL 500 MG
TABLET, EXTENDED RELEASE 24 HR ORAL
Qty: 360 TABLET | Refills: 0 | OUTPATIENT
Start: 2017-06-29

## 2017-11-10 DIAGNOSIS — E11.65 TYPE 2 DIABETES MELLITUS WITH HYPERGLYCEMIA, WITHOUT LONG-TERM CURRENT USE OF INSULIN (H): ICD-10-CM

## 2017-11-13 ENCOUNTER — OFFICE VISIT (OUTPATIENT)
Dept: URGENT CARE | Facility: URGENT CARE | Age: 57
End: 2017-11-13
Payer: COMMERCIAL

## 2017-11-13 VITALS
HEIGHT: 62 IN | BODY MASS INDEX: 31.1 KG/M2 | TEMPERATURE: 98.3 F | WEIGHT: 169 LBS | OXYGEN SATURATION: 99 % | SYSTOLIC BLOOD PRESSURE: 122 MMHG | DIASTOLIC BLOOD PRESSURE: 60 MMHG | HEART RATE: 100 BPM

## 2017-11-13 DIAGNOSIS — J01.90 ACUTE SINUSITIS WITH SYMPTOMS > 10 DAYS: Primary | ICD-10-CM

## 2017-11-13 DIAGNOSIS — E11.65 TYPE 2 DIABETES MELLITUS WITH HYPERGLYCEMIA, WITHOUT LONG-TERM CURRENT USE OF INSULIN (H): ICD-10-CM

## 2017-11-13 PROCEDURE — 99213 OFFICE O/P EST LOW 20 MIN: CPT | Performed by: INTERNAL MEDICINE

## 2017-11-13 RX ORDER — FLUTICASONE PROPIONATE 50 MCG
1-2 SPRAY, SUSPENSION (ML) NASAL DAILY
Qty: 1 BOTTLE | Refills: 0 | Status: SHIPPED | OUTPATIENT
Start: 2017-11-13 | End: 2017-11-14

## 2017-11-13 RX ORDER — METFORMIN HCL 500 MG
TABLET, EXTENDED RELEASE 24 HR ORAL
Qty: 120 TABLET | Refills: 0 | Status: SHIPPED | OUTPATIENT
Start: 2017-11-13 | End: 2017-11-13

## 2017-11-13 ASSESSMENT — ENCOUNTER SYMPTOMS
FEVER: 0
CHILLS: 0
SORE THROAT: 0
SHORTNESS OF BREATH: 0

## 2017-11-13 NOTE — MR AVS SNAPSHOT
After Visit Summary   11/13/2017    Inger Wegener    MRN: [id number]           Patient Information     Date Of Birth          1960        Visit Information        Provider Department      11/13/2017 7:30 PM Maritza Garcia MD [facility]        Today's Diagnoses     Acute sinusitis with symptoms > 10 days    -  1       Follow-ups after your visit        Your next 10 appointments already scheduled     Dec 19, 2017  4:00 PM CST   SHORT with Yash Ornelas RPH   [facility] ([facility])    [address] [place] [id number] [phone]              Who to contact     If you have questions or need follow up information about today's clinic visit or your schedule please contact [facility] directly at [phone].  Normal or non-critical lab and imaging results will be communicated to you by MyChart, letter or phone within 4 business days after the clinic has received the results. If you do not hear from us within 7 days, please contact the clinic through MyChart or phone. If you have a critical or abnormal lab result, we will notify you by phone as soon as possible.  Submit refill requests through [employer] or call your pharmacy and they will forward the refill request to us. Please allow 3 business days for your refill to be completed.          Additional Information About Your Visit        MyChart Information     [employer] gives you secure access to your electronic health record. If you see a primary care provider, you can also send messages to your care team and make appointments. If you have questions, please call your primary care clinic.  If you do not have a primary care provider, please call [phone] and they will assist you.        Care EveryWhere ID     This is your Care EveryWhere ID. This could be used by other organizations to access your [facility]  "records  [id number]        Your Vitals Were     Pulse Temperature Height Pulse Oximetry BMI (Body Mass Index)       100 98.3  F (36.8  C) (Oral) 5' 2\" (1.575 m) 99% 30.91 kg/m2        Blood Pressure from Last 3 Encounters:   11/13/17 122/60   06/20/17 120/76   03/08/17 130/88    Weight from Last 3 Encounters:   11/13/17 169 lb (76.7 kg)   06/20/17 169 lb 6.4 oz (76.8 kg)   03/08/17 167 lb (75.8 kg)              Today, you had the following     No orders found for display         Today's Medication Changes          These changes are accurate as of: 11/13/17  8:14 PM.  If you have any questions, ask your nurse or doctor.               Start taking these medicines.        Dose/Directions    amoxicillin-clavulanate 875-125 MG per tablet   Commonly known as:  AUGMENTIN   Used for:  Acute sinusitis with symptoms > 10 days   Started by:  Maritza Garcia MD        Dose:  1 tablet   Take 1 tablet by mouth 2 times daily   Quantity:  20 tablet   Refills:  0       fluticasone 50 MCG/ACT spray   Commonly known as:  FLONASE   Used for:  Acute sinusitis with symptoms > 10 days   Started by:  Maritza Garcia MD        Dose:  1-2 spray   Spray 1-2 sprays into both nostrils daily   Quantity:  1 Bottle   Refills:  0         These medicines have changed or have updated prescriptions.        Dose/Directions    metFORMIN 500 MG 24 hr tablet   Commonly known as:  GLUCOPHAGE-XR   This may have changed:  Another medication with the same name was removed. Continue taking this medication, and follow the directions you see here.   Used for:  Type 2 diabetes mellitus with hyperglycemia, without long-term current use of insulin (H)   Changed by:  Diane Diaz APRN CNP        TAKE 2 TABLETS(1000 MG) BY MOUTH TWICE DAILY WITH MEALS   Quantity:  120 tablet   Refills:  0         Stop taking these medicines if you haven't already. Please contact your care team if you have questions.     lisinopril 10 MG tablet   Commonly " known as:  PRINIVIL/ZESTRIL   Stopped by:  Maritza Garcia MD                Where to get your medicines      These medications were sent to [id number] - [place], [place] - [address] AT [facility] [address]  [address], [place] [address]    Hours:  24-hours Phone:  [phone]     amoxicillin-clavulanate 875-125 MG per tablet    fluticasone 50 MCG/ACT spray                Primary Care Provider Office Phone # Fax #    DANGELO Verduzco [phone] [address] [place] [address]        Equal Access to Services     MARIBEL HOOD AH: Hadii leslie ku hadasho Soomaali, waaxda luqadaha, qaybta kaalmada adeegyada, waxay idiin haypoorniman [facility] . So [facility] [phone].    ATENCIÓN: Si habla español, tiene a bullard disposición servicios gratuitos de asistencia lingüística. [facility] [phone].    We comply with applicable federal civil rights laws and [place] laws. We do not discriminate on the basis of race, color, national origin, age, disability, sex, sexual orientation, or gender identity.            Thank you!     Thank you for choosing [facility]  for your care. Our goal is always to provide you with excellent care. Hearing back from our patients is one way we can continue to improve our services. Please take a few minutes to complete the written survey that you may receive in the mail after your visit with us. Thank you!             Your Updated Medication List - Protect others around you: Learn how to safely use, store and throw away your medicines at www.disposemymeds.org.          This list is accurate as of: 11/13/17  8:14 PM.  Always use your most recent med list.                   Brand Name Dispense Instructions for use Diagnosis    ADVIL PO      Take 400 mg by mouth as needed        amoxicillin-clavulanate 875-125 MG per tablet    AUGMENTIN    20 tablet    Take 1 tablet by mouth 2  times daily    Acute sinusitis with symptoms > 10 days       ASPIRIN NOT PRESCRIBED    INTENTIONAL     continuous prn for other Reported on 3/8/2017        blood glucose monitoring lancets     3 Box    Use to test blood sugar 2 times daily or as directed.  Ok to substitute alternative if insurance prefers.    Type 2 diabetes mellitus with hyperglycemia, without long-term current use of insulin (H)       blood glucose monitoring test strip    ACCU-CHEK SMARTVIEW    180 each    Use to test blood sugar 2 times daily or as directed.  Ok to substitute alternative if insurance prefers. Profile Rx: patient will contact pharmacy when needed    Type 2 diabetes mellitus with hyperglycemia, without long-term current use of insulin (H)       fluticasone 50 MCG/ACT spray    FLONASE    1 Bottle    Spray 1-2 sprays into both nostrils daily    Acute sinusitis with symptoms > 10 days       hydrochlorothiazide 25 MG tablet    HYDRODIURIL    90 tablet    Take 1 tablet (25 mg) by mouth daily    Hypertension goal BP (blood pressure) < 140/90       metFORMIN 500 MG 24 hr tablet    GLUCOPHAGE-XR    120 tablet    TAKE 2 TABLETS(1000 MG) BY MOUTH TWICE DAILY WITH MEALS    Type 2 diabetes mellitus with hyperglycemia, without long-term current use of insulin (H)       order for DME     2 Units    Equipment being ordered: Compression stocking, medium compression    Type 2 diabetes mellitus with hyperglycemia, without long-term current use of insulin (H)       simvastatin 20 MG tablet    ZOCOR    90 tablet    Take 1 tablet (20 mg) by mouth At Bedtime    Hyperlipidemia with target LDL less than 130       venlafaxine 37.5 MG 24 hr capsule    EFFEXOR-XR    270 capsule    Take 3 capsules (112.5 mg) by mouth daily    Adjustment disorder with mixed anxiety and depressed mood

## 2017-11-14 DIAGNOSIS — J01.90 ACUTE SINUSITIS WITH SYMPTOMS > 10 DAYS: ICD-10-CM

## 2017-11-14 NOTE — NURSING NOTE
"Inger Wegener;   Chief Complaint   Patient presents with     Sinus Problem     c/o head congestion - onset 2 weeks ago, facial pressure, yellow drainage, prior to that 2-3 weeks of drainage and post nasal drainage      Urgent Care     Initial /60 (BP Location: Left arm, Patient Position: Chair, Cuff Size: Adult Regular)  Pulse 100  Temp 98.3  F (36.8  C) (Oral)  Ht 5' 2\" (1.575 m)  Wt 169 lb (76.7 kg)  SpO2 99%  BMI 30.91 kg/m2 Estimated body mass index is 30.91 kg/(m^2) as calculated from the following:    Height as of this encounter: 5' 2\" (1.575 m).    Weight as of this encounter: 169 lb (76.7 kg)..  BP completed using cuff size regular.  Tiffanie Mae R.N.  "

## 2017-11-14 NOTE — PROGRESS NOTES
SUBJECTIVE:   Inger Wegener is a 57 year old female presenting with a chief complaint of   Chief Complaint   Patient presents with     Sinus Problem     c/o head congestion - onset 2 weeks ago, facial pressure, yellow drainage, prior to that 2-3 weeks of drainage and post nasal drainage      Urgent Care   .    Onset of symptoms was 4+ week(s) ago.  First cough then sinus pain for 2 weeks  Course of illness is worsening.      Current and Associated symptoms: cough - productive and facial pain/pressure  Unending supply of nasal mucous  Treatment measures tried include OTC Cough med.  Predisposing factors include ill contact: School.  Special ed    Review of Systems   Constitutional: Negative for chills and fever.   HENT: Positive for congestion. Negative for ear pain and sore throat.    Respiratory: Negative for shortness of breath.          No past medical history on file.  Current Outpatient Prescriptions   Medication Sig Dispense Refill     metFORMIN (GLUCOPHAGE-XR) 500 MG 24 hr tablet TAKE 2 TABLETS(1000 MG) BY MOUTH TWICE DAILY WITH MEALS 120 tablet 0     amoxicillin-clavulanate (AUGMENTIN) 875-125 MG per tablet Take 1 tablet by mouth 2 times daily 20 tablet 0     fluticasone (FLONASE) 50 MCG/ACT spray Spray 1-2 sprays into both nostrils daily 1 Bottle 0     blood glucose monitoring (ACCU-CHEK SMARTVIEW) test strip Use to test blood sugar 2 times daily or as directed.  Ok to substitute alternative if insurance prefers.  Profile Rx: patient will contact pharmacy when needed 180 each 1     venlafaxine (EFFEXOR-XR) 37.5 MG 24 hr capsule Take 3 capsules (112.5 mg) by mouth daily 270 capsule 3     simvastatin (ZOCOR) 20 MG tablet Take 1 tablet (20 mg) by mouth At Bedtime 90 tablet 3     hydrochlorothiazide (HYDRODIURIL) 25 MG tablet Take 1 tablet (25 mg) by mouth daily 90 tablet 3     blood glucose monitoring (ACCU-CHEK FASTCLIX) lancets Use to test blood sugar 2 times daily or as directed.  Ok to substitute  "alternative if insurance prefers. 3 Box 3     order for DME Equipment being ordered: Compression stocking, medium compression 2 Units 11     Ibuprofen (ADVIL PO) Take 400 mg by mouth as needed        [DISCONTINUED] metFORMIN (GLUCOPHAGE-XR) 500 MG 24 hr tablet Take 2 tablets (1,000 mg) by mouth 2 times daily (with meals) 360 tablet 3     ASPIRIN NOT PRESCRIBED (INTENTIONAL) continuous prn for other Reported on 3/8/2017       Social History   Substance Use Topics     Smoking status: Passive Smoke Exposure - Never Smoker     Smokeless tobacco: Never Used     Alcohol use Yes       OBJECTIVE  /60 (BP Location: Left arm, Patient Position: Chair, Cuff Size: Adult Regular)  Pulse 100  Temp 98.3  F (36.8  C) (Oral)  Ht 5' 2\" (1.575 m)  Wt 169 lb (76.7 kg)  SpO2 99%  BMI 30.91 kg/m2    Physical Exam   Constitutional: She is well-developed, well-nourished, and in no distress.   HENT:   Mouth/Throat: No oropharyngeal exudate.   tympanic membrane clear  Nares congested  OP - normal   Maxillary tenderness bilateral    Cardiovascular: Normal rate, regular rhythm and normal heart sounds.    Pulmonary/Chest: Effort normal and breath sounds normal.   Lymphadenopathy:     She has no cervical adenopathy.       Labs:  No results found for this or any previous visit (from the past 24 hour(s)).        ASSESSMENT:      ICD-10-CM    1. Acute sinusitis with symptoms > 10 days J01.90 amoxicillin-clavulanate (AUGMENTIN) 875-125 MG per tablet     fluticasone (FLONASE) 50 MCG/ACT spray      Handout on sinusitis  Medical Decision Making:    PLAN:  Saline gargles, Saline nasal spray, Vaporizer and   augmentin   flonase    Call or return to clinic if symptoms worsen or fail to improve as anticipated.    "

## 2017-11-15 RX ORDER — METFORMIN HCL 500 MG
TABLET, EXTENDED RELEASE 24 HR ORAL
Qty: 360 TABLET | Refills: 0 | Status: SHIPPED | OUTPATIENT
Start: 2017-11-15 | End: 2018-03-09

## 2017-11-17 ENCOUNTER — TELEPHONE (OUTPATIENT)
Dept: FAMILY MEDICINE | Facility: CLINIC | Age: 57
End: 2017-11-17

## 2017-11-17 DIAGNOSIS — J01.90 ACUTE SINUSITIS WITH SYMPTOMS > 10 DAYS: ICD-10-CM

## 2017-11-17 RX ORDER — FLUTICASONE PROPIONATE 50 MCG
1-2 SPRAY, SUSPENSION (ML) NASAL DAILY
Qty: 3 BOTTLE | Refills: 0 | Status: SHIPPED | OUTPATIENT
Start: 2017-11-17 | End: 2017-11-17

## 2017-11-17 NOTE — TELEPHONE ENCOUNTER
Future Office visit:    Next 5 appointments (look out 90 days)     Dec 19, 2017  4:00 PM CST   SHORT with Yash Ornelas RPH   [facility] ([facility])    [address] [place] [id number] [phone]                   Routing refill request to provider for review/approval because:  Drug not on the [facility], UMP or  Health refill protocol or controlled substance  Last office visit 3/17

## 2017-11-18 NOTE — TELEPHONE ENCOUNTER
"11/17/17    Patient is due for a Mammogram screening.    Patient Communication Preferences indicate  Do not contact  and/or communication by \"Phone\" is not preferred. Call not required per Outreach team.    Fifi Mccurdy    "

## 2017-11-20 NOTE — TELEPHONE ENCOUNTER
fluticasone (FLONASE) 50 MCG/ACT spray 3 Bottle 0 11/17/2017  No      Sig: Spray 1-2 sprays into both nostrils daily     lov 6/20/17  orquidea patel

## 2017-11-21 RX ORDER — FLUTICASONE PROPIONATE 50 MCG
SPRAY, SUSPENSION (ML) NASAL
Qty: 48 ML | Refills: 0 | Status: SHIPPED | OUTPATIENT
Start: 2017-11-21 | End: 2018-04-18

## 2017-12-20 ENCOUNTER — TELEPHONE (OUTPATIENT)
Dept: PHARMACY | Facility: CLINIC | Age: 57
End: 2017-12-20

## 2017-12-20 DIAGNOSIS — E55.9 VITAMIN D DEFICIENCY: ICD-10-CM

## 2017-12-20 DIAGNOSIS — E11.65 TYPE 2 DIABETES MELLITUS WITH HYPERGLYCEMIA, WITHOUT LONG-TERM CURRENT USE OF INSULIN (H): Primary | ICD-10-CM

## 2017-12-20 DIAGNOSIS — E78.5 HYPERLIPIDEMIA WITH TARGET LDL LESS THAN 130: ICD-10-CM

## 2017-12-20 DIAGNOSIS — Z13.6 CARDIOVASCULAR SCREENING; LDL GOAL LESS THAN 160: ICD-10-CM

## 2017-12-20 DIAGNOSIS — E53.8 VITAMIN B12 DEFICIENCY (NON ANEMIC): ICD-10-CM

## 2017-12-20 NOTE — TELEPHONE ENCOUNTER
12-20-17      mt will add labs to epic --see patient next week.      Yash Ornelas Rph.  Medication Therapy Management Provider  [phone]

## 2017-12-26 ENCOUNTER — OFFICE VISIT (OUTPATIENT)
Dept: PHARMACY | Facility: CLINIC | Age: 57
End: 2017-12-26
Payer: COMMERCIAL

## 2017-12-26 VITALS
SYSTOLIC BLOOD PRESSURE: 126 MMHG | HEART RATE: 78 BPM | WEIGHT: 173 LBS | OXYGEN SATURATION: 98 % | DIASTOLIC BLOOD PRESSURE: 84 MMHG | BODY MASS INDEX: 31.64 KG/M2

## 2017-12-26 DIAGNOSIS — E78.5 HYPERLIPIDEMIA WITH TARGET LDL LESS THAN 130: ICD-10-CM

## 2017-12-26 DIAGNOSIS — E11.65 TYPE 2 DIABETES MELLITUS WITH HYPERGLYCEMIA, WITHOUT LONG-TERM CURRENT USE OF INSULIN (H): ICD-10-CM

## 2017-12-26 DIAGNOSIS — E53.8 VITAMIN B12 DEFICIENCY (NON ANEMIC): ICD-10-CM

## 2017-12-26 DIAGNOSIS — E55.9 VITAMIN D DEFICIENCY: ICD-10-CM

## 2017-12-26 DIAGNOSIS — E11.65 TYPE 2 DIABETES MELLITUS WITH HYPERGLYCEMIA, WITHOUT LONG-TERM CURRENT USE OF INSULIN (H): Primary | ICD-10-CM

## 2017-12-26 DIAGNOSIS — I10 HYPERTENSION GOAL BP (BLOOD PRESSURE) < 140/90: ICD-10-CM

## 2017-12-26 DIAGNOSIS — F43.23 ADJUSTMENT DISORDER WITH MIXED ANXIETY AND DEPRESSED MOOD: ICD-10-CM

## 2017-12-26 LAB
HBA1C MFR BLD: 6.9 % (ref 4.3–6)
VIT B12 SERPL-MCNC: 333 PG/ML (ref 193–986)

## 2017-12-26 PROCEDURE — 99607 MTMS BY PHARM ADDL 15 MIN: CPT | Performed by: PHARMACIST

## 2017-12-26 PROCEDURE — 84443 ASSAY THYROID STIM HORMONE: CPT | Performed by: NURSE PRACTITIONER

## 2017-12-26 PROCEDURE — 80061 LIPID PANEL: CPT | Performed by: NURSE PRACTITIONER

## 2017-12-26 PROCEDURE — 36415 COLL VENOUS BLD VENIPUNCTURE: CPT | Performed by: NURSE PRACTITIONER

## 2017-12-26 PROCEDURE — 84439 ASSAY OF FREE THYROXINE: CPT | Performed by: NURSE PRACTITIONER

## 2017-12-26 PROCEDURE — 83036 HEMOGLOBIN GLYCOSYLATED A1C: CPT | Performed by: NURSE PRACTITIONER

## 2017-12-26 PROCEDURE — 82043 UR ALBUMIN QUANTITATIVE: CPT | Performed by: NURSE PRACTITIONER

## 2017-12-26 PROCEDURE — 80053 COMPREHEN METABOLIC PANEL: CPT | Performed by: NURSE PRACTITIONER

## 2017-12-26 PROCEDURE — 82607 VITAMIN B-12: CPT | Performed by: NURSE PRACTITIONER

## 2017-12-26 PROCEDURE — 99606 MTMS BY PHARM EST 15 MIN: CPT | Performed by: PHARMACIST

## 2017-12-26 PROCEDURE — 82306 VITAMIN D 25 HYDROXY: CPT | Performed by: NURSE PRACTITIONER

## 2017-12-26 RX ORDER — LANCING DEVICE/LANCETS
KIT MISCELLANEOUS
Qty: 1 EACH | Refills: 0 | Status: SHIPPED | OUTPATIENT
Start: 2017-12-26

## 2017-12-26 ASSESSMENT — PATIENT HEALTH QUESTIONNAIRE - PHQ9: SUM OF ALL RESPONSES TO PHQ QUESTIONS 1-9: 5

## 2017-12-26 NOTE — PROGRESS NOTES
SUBJECTIVE/OBJECTIVE:                                                    Inger Wegener is a 57 year old female coming in for a follow-up visit (6-20-17) for Medication Therapy Management.  She was referred to me from Diane, Her PCP.     Chief Complaint: Here for fasting labs.       Trying to wean off effexor --2/day now ---taking it for depression. No current plan to decrease but would like to get off. She isnt sure about this as her tiredness/sleeping has increased and she lacks motivation to do diet and exercise changes.           Personal Healthcare Goals: Control diabetes with the least amount of medications possible.     Allergies/ADRs: Reviewed in Epic. 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 Had gestational diabetes with her last child.     Medication Adherence: no issues reported by patient    Diabetes:  Pt currently taking Metformin 500 mg er tablets-2 twice a day.  Pt is not experiencing side effects. Had diarrhea when starting metformin but that has mostly resolved.   Patient has a fear of needles. If we want to start considering an injectable, she would like something like trulicity -she fears needles.  Feels weakness before eating.   SMBG: see chart below.                     Symptoms of low blood sugar? shaky, dizzy, weak, sweaty. Frequency of hypoglycemia?   Recent symptoms of high blood sugar? none  Eye exam: 3-13-17--had eye exam for dm --all good .   Foot exam: due  Microalbumin is < 30 mg/g. Pt is not taking an ACEi/ARB.  Aspirin: Not taking  Diet/Exercise: Walk her dog every other day, go on bike rides 10 blocks 2 times weely, garden, mow the lawn. Working on eating more protein. Has reduced sugar and carbohydrate  intake drastically. Patient has one treat each night of a laughing cow ice cream sandwich (30 grams) at bedtime.       Hypertension. Patient currently taking hydrochlorothiazide 25 mg daily + lisinopril 10mg./day . Pt does not monitor blood pressure at home.     Hyperlipidemia: Current therapy includes simvastatin 20 mg once daily.  Pt reports no significant myalgias or other side effects.   The 10-year ASCVD risk score ([place] GARCIA Jr, et al., 2013) is: 4.6%    Values used to calculate the score:      Age: 57 years      Sex: Female      Is Non- : No      Diabetic: Yes      Tobacco smoker: No      Systolic Blood Pressure: 122 mmHg      Is BP treated: No      HDL Cholesterol: 48 mg/dL      Total Cholesterol: 194 mg/dL     Repeat non-fasting lipids today .    Depression:  Current medications include: Venlafaxine 75mg once daily. Pt reports that depression symptoms are worsened. Current depression symptoms include:no energy , tired, fatigue,overwhelming sadness about anything,  life stuff- memory issus, divorce, work stress. Patient reports the following stressors: job change, seperation/ from significant other and chronic health condition .  PHQ-9 SCORE 5/4/2016 12/29/2016 12/26/2017   Total Score - - -   Total Score MyChart - - -   Total Score 3 1 5               Vitamin D3: level was 35 on 6-20-17.   Vitamin B12: level was 390 on 6-20-17.         Current labs include:  BP Readings from Last 3 Encounters:   12/26/17 126/84   11/13/17 122/60   06/20/17 120/76     Today's Vitals: /84  Pulse 78  Wt 173 lb (78.5 kg)  SpO2 98%  BMI 31.64 kg/m2  Lab Results   Component Value Date    A1C 6.9 12/26/2017   .  Lab Results   Component Value Date    CHOL 194 06/20/2017     Lab Results   Component Value Date    TRIG 158 06/20/2017     Lab Results   Component Value Date    HDL 48 06/20/2017     Lab Results   Component Value Date     06/20/2017       Liver Function Studies -   Recent  Labs   Lab Test  12/29/16   1044   PROTTOTAL  7.5   ALBUMIN  4.6   BILITOTAL  0.4   ALKPHOS  87   AST  17   ALT  38       Lab Results   Component Value Date    UCRR 68 12/29/2016    MICROL 7 12/29/2016    UMALCR 9.82 12/29/2016       Last Basic Metabolic Panel:  Lab Results   Component Value Date     12/29/2016      Lab Results   Component Value Date    POTASSIUM 3.4 12/29/2016     Lab Results   Component Value Date    CHLORIDE 100 12/29/2016     Lab Results   Component Value Date    BUN 12 12/29/2016     Lab Results   Component Value Date    CR 0.73 12/29/2016     GFR Estimate   Date Value Ref Range Status   12/29/2016 82 >60 mL/min/1.7m2 Final     Comment:     Non  GFR Calc   09/29/2015 75 >60 mL/min/1.7m2 Final     Comment:     Non  GFR Calc   09/10/2014 84 >60 mL/min/1.7m2 Final     Comment:     Non  GFR Calc     GFR Estimate If Black   Date Value Ref Range Status   12/29/2016 >90   GFR Calc   >60 mL/min/1.7m2 Final   09/29/2015 >90   GFR Calc   >60 mL/min/1.7m2 Final   09/10/2014 >90   GFR Calc   >60 mL/min/1.7m2 Final     TSH   Date Value Ref Range Status   06/20/2017 2.97 0.40 - 4.00 mU/L Final   ]    Most Recent Immunizations   Administered Date(s) Administered     Influenza Vaccine IM 3yrs+ 4 Valent IIV4 10/01/2016     Influenza Vaccine, 3 YRS +, IM (QUADRIVALENT W/PRESERVATIVES) 09/24/2015     TDAP Vaccine (Adacel) 11/13/2013     Twinrix A/B 12/29/2016           ASSESSMENT:                                                       Current medications were reviewed with her today.     Medication Adherence: no issues identified    Diabetes: Stable. Patient is meeting A1c goal of < 7%.      Hypertension: Stable. Patient is meeting BP goal of < 140/90mmHg.     Depression: Needs Improvement-PHQ 9 score increased to 5 today.  Patient would benefit from increasing venlafaxine extended release dose to 112.5 mg per day  now through the winter months.  Discussed plan to wean off drug for the summer months she will see me early June for 30 day wean down if she is planned for it and is ready for.      Hyperlipidemia: Stable. Pt is on moderate intensity statin which is indicated based on 2013 ACC/AHA guidelines for lipid management.  Nonfasting cholesterol recheck from today result is pending    Vitamin D3: is not at goal of 50-75ng/mL. Pt would benefit from lab recheck today result is pending.  Vitamin B12: is not at goal of 600-1000pg/mL. Pt would benefit from vitamin B12 lab recheck today result is pending.        PLAN:                                                      1. FYI-- A1c today = 6.9% --excellent.   Low-carb diet and daily exercise helps!  FYI-exercise - walking and especially weight lifting lowers depression issues.     FYI--7% a1c = about 154 blood sugar average.     2. FYI-- your depression score is a 5 today --mild depression --but that is higher than last year --for the winter at least -please increase your Venlafaxine daily dose back to 112.5mg.                 Next MTM visit:  Watch [facility] for lab results --see me in 6 months --Wednesday June 6th at 4;30pm --OK to arrive late.      I spent 30 minutes with this patient today.     My Clinical Pharmacist's contact information:                                                      It was a pleasure seeing you today!  Please feel free to contact me with any questions or concerns you have.      Yash Ornelas Rph.  Medication Therapy Management Provider  [phone]      You may receive a survey about the MTM services you received.  I would appreciate your feedback to help me serve you better in the future. Please fill it out and return it when you can. Your comments will be anonymous.

## 2017-12-26 NOTE — MR AVS SNAPSHOT
After Visit Summary   12/26/2017    Inger Wegener    MRN: [id number]           Patient Information     Date Of Birth          1960        Visit Information        Provider Department      12/26/2017 1:30 PM Yash Ornelas RPH [facility] MTM        Today's Diagnoses     Type 2 diabetes mellitus with hyperglycemia, without long-term current use of insulin (H)    -  1      Care Instructions    Recommendations from today's MT visit:                                                        1. FYI-- A1c today = 6.9% --excellent.   Low-carb diet and daily exercise helps!  FYI-exercise - walking and especially weight lifting lowers depression issues.     2. FYI-- your depression score is a 5 today --mild depression --but that is higher than last year --for the winter at least -please increase your Venlafaxine daily dose back to 112.5mg.                 Next MTM visit:  Watch [employer]ite[employer]la for lab results --see me in 6 months --Wednesday June 6th at 4;30pm --OK to arrive late.        To schedule another MT appointment, please call the clinic directly or you may call the MTM scheduling line at [phone] or toll-free at [phone].     My Clinical Pharmacist's contact information:                                                      It was a pleasure seeing you today!  Please feel free to contact me with any questions or concerns you have.      Yash Ornelas Rph.  Medication Therapy Management Provider  [phone]      You may receive a survey about the MT services you received.  I would appreciate your feedback to help me serve you better in the future. Please fill it out and return it when you can. Your comments will be anonymous.      My healthcare goals:                                                                      Follow-ups after your visit        Your next 10 appointments already scheduled     Jun 06, 2018  4:30 PM CDT   SHORT with TORRI Ingram  [facility]    [address]  [place] [id number] [phone]              Who to contact     If you have questions or need follow up information about today's clinic visit or your schedule please contact [facility] directly at [phone].  Normal or non-critical lab and imaging results will be communicated to you by MyChart, letter or phone within 4 business days after the clinic has received the results. If you do not hear from us within 7 days, please contact the clinic through [employer] or phone. If you have a critical or abnormal lab result, we will notify you by phone as soon as possible.  Submit refill requests through [employer] or call your pharmacy and they will forward the refill request to us. Please allow 3 business days for your refill to be completed.          Additional Information About Your Visit        [employer] Information     [employer] gives you secure access to your electronic health record. If you see a primary care provider, you can also send messages to your care team and make appointments. If you have questions, please call your primary care clinic.  If you do not have a primary care provider, please call [phone] and they will assist you.        Care EveryWhere ID     This is your Care EveryWhere ID. This could be used by other organizations to access your [place] medical records  [id number]        Your Vitals Were     Pulse Pulse Oximetry BMI (Body Mass Index)             78 98% 31.64 kg/m2          Blood Pressure from Last 3 Encounters:   12/26/17 126/84   11/13/17 122/60   06/20/17 120/76    Weight from Last 3 Encounters:   12/26/17 173 lb (78.5 kg)   11/13/17 169 lb (76.7 kg)   06/20/17 169 lb 6.4 oz (76.8 kg)              Today, you had the following     No orders found for display         Today's Medication Changes          These changes are accurate as of: 12/26/17  2:27 PM.  If you have any  questions, ask your nurse or doctor.               Start taking these medicines.        Dose/Directions    blood glucose lancing device   Used for:  Type 2 diabetes mellitus with hyperglycemia, without long-term current use of insulin (H)   Started by:  Yash Ornelas RPH        Device to be used with lancets.   Quantity:  1 each   Refills:  0         These medicines have changed or have updated prescriptions.        Dose/Directions    venlafaxine 37.5 MG 24 hr capsule   Commonly known as:  EFFEXOR-XR   This may have changed:  how much to take   Used for:  Adjustment disorder with mixed anxiety and depressed mood        Dose:  112.5 mg   Take 3 capsules (112.5 mg) by mouth daily   Quantity:  270 capsule   Refills:  3            Where to get your medicines      These medications were sent to [facility] [id number] - [place], [place] - [address] AT [facility] [address]  [address], [place] [address]    Hours:  24-hours Phone:  [phone]     blood glucose lancing device                Primary Care Provider Office Phone # Fax #    Diane Diaz, DANGELO [facility] [phone]       [address] [place] [address]        Equal Access to Services     MARIBEL HOOD AH: Hadii leslie hsu hadasho Soomaali, waaxda luqadaha, qaybta kaalmada adeegyada, fernanda lemos. So [facility] [phone].    ATENCIÓN: Si habla español, tiene a bullard disposición servicios gratuitos de asistencia lingüística. Llame al [phone].    We comply with applicable federal civil rights laws and [place] laws. We do not discriminate on the basis of race, color, national origin, age, disability, sex, sexual orientation, or gender identity.            Thank you!     Thank you for choosing [facility]  for your care. Our goal is always to provide you with excellent care. Hearing back from our patients is one way we can continue to improve our services.  Please take a few minutes to complete the written survey that you may receive in the mail after your visit with us. Thank you!             Your Updated Medication List - Protect others around you: Learn how to safely use, store and throw away your medicines at www.disposemymeds.org.          This list is accurate as of: 12/26/17  2:27 PM.  Always use your most recent med list.                   Brand Name Dispense Instructions for use Diagnosis    ADVIL PO      Take 400 mg by mouth as needed        ASPIRIN NOT PRESCRIBED    INTENTIONAL     continuous prn for other Reported on 3/8/2017        blood glucose lancing device     1 each    Device to be used with lancets.    Type 2 diabetes mellitus with hyperglycemia, without long-term current use of insulin (H)       blood glucose monitoring lancets     3 Box    Use to test blood sugar 2 times daily or as directed.  Ok to substitute alternative if insurance prefers.    Type 2 diabetes mellitus with hyperglycemia, without long-term current use of insulin (H)       blood glucose monitoring test strip    ACCU-CHEK SMARTVIEW    180 each    Use to test blood sugar 2 times daily or as directed.  Ok to substitute alternative if insurance prefers. Profile Rx: patient will contact pharmacy when needed    Type 2 diabetes mellitus with hyperglycemia, without long-term current use of insulin (H)       fluticasone 50 MCG/ACT spray    FLONASE    48 mL    SHAKE LIQUID AND USE 1 TO 2 SPRAYS IN EACH NOSTRIL DAILY    Acute sinusitis with symptoms > 10 days       hydrochlorothiazide 25 MG tablet    HYDRODIURIL    90 tablet    Take 1 tablet (25 mg) by mouth daily    Hypertension goal BP (blood pressure) < 140/90       metFORMIN 500 MG 24 hr tablet    GLUCOPHAGE-XR    360 tablet    TAKE 2 TABLETS BY MOUTH TWICE DAILY WITH MEALS    Type 2 diabetes mellitus with hyperglycemia, without long-term current use of insulin (H)       order for DME     2 Units    Equipment being ordered: Compression  stocking, medium compression    Type 2 diabetes mellitus with hyperglycemia, without long-term current use of insulin (H)       simvastatin 20 MG tablet    ZOCOR    90 tablet    Take 1 tablet (20 mg) by mouth At Bedtime    Hyperlipidemia with target LDL less than 130       venlafaxine 37.5 MG 24 hr capsule    EFFEXOR-XR    270 capsule    Take 3 capsules (112.5 mg) by mouth daily    Adjustment disorder with mixed anxiety and depressed mood

## 2017-12-26 NOTE — PATIENT INSTRUCTIONS
Recommendations from today's MTM visit:                                                        1. FYI-- A1c today = 6.9% --excellent.   Low-carb diet and daily exercise helps!  FYI-exercise - walking and especially weight lifting lowers depression issues.     FYI--7% a1c = about 154 blood sugar average.     2. FYI-- your depression score is a 5 today --mild depression --but that is higher than last year --for the winter at least -please increase your Venlafaxine daily dose back to 112.5mg.                 Next MTM visit:  Watch [employer] for lab results --see me in 6 months --Wednesday June 6th at 4;30pm --OK to arrive late.        To schedule another MTM appointment, please call the clinic directly or you may call the MTM scheduling line at [phone] or toll-free at [phone].     My Clinical Pharmacist's contact information:                                                      It was a pleasure seeing you today!  Please feel free to contact me with any questions or concerns you have.      Yash Ornelas [facility].  Medication Therapy Management Provider  [phone]      You may receive a survey about the MTM services you received.  I would appreciate your feedback to help me serve you better in the future. Please fill it out and return it when you can. Your comments will be anonymous.      My healthcare goals:

## 2017-12-27 ENCOUNTER — MYC MEDICAL ADVICE (OUTPATIENT)
Dept: PHARMACY | Facility: CLINIC | Age: 57
End: 2017-12-27

## 2017-12-27 LAB
ALBUMIN SERPL-MCNC: 4.3 G/DL (ref 3.4–5)
ALP SERPL-CCNC: 79 U/L (ref 40–150)
ALT SERPL W P-5'-P-CCNC: 30 U/L (ref 0–50)
ANION GAP SERPL CALCULATED.3IONS-SCNC: 8 MMOL/L (ref 3–14)
AST SERPL W P-5'-P-CCNC: 13 U/L (ref 0–45)
BILIRUB SERPL-MCNC: 0.3 MG/DL (ref 0.2–1.3)
BUN SERPL-MCNC: 14 MG/DL (ref 7–30)
CALCIUM SERPL-MCNC: 9.3 MG/DL (ref 8.5–10.1)
CHLORIDE SERPL-SCNC: 101 MMOL/L (ref 94–109)
CHOLEST SERPL-MCNC: 193 MG/DL
CO2 SERPL-SCNC: 29 MMOL/L (ref 20–32)
CREAT SERPL-MCNC: 0.61 MG/DL (ref 0.52–1.04)
CREAT UR-MCNC: 30 MG/DL
DEPRECATED CALCIDIOL+CALCIFEROL SERPL-MC: 30 UG/L (ref 20–75)
GFR SERPL CREATININE-BSD FRML MDRD: >90 ML/MIN/1.7M2
GLUCOSE SERPL-MCNC: 119 MG/DL (ref 70–99)
HDLC SERPL-MCNC: 48 MG/DL
LDLC SERPL CALC-MCNC: 85 MG/DL
MICROALBUMIN UR-MCNC: <5 MG/L
MICROALBUMIN/CREAT UR: NORMAL MG/G CR (ref 0–25)
NONHDLC SERPL-MCNC: 145 MG/DL
POTASSIUM SERPL-SCNC: 3.6 MMOL/L (ref 3.4–5.3)
PROT SERPL-MCNC: 7.4 G/DL (ref 6.8–8.8)
SODIUM SERPL-SCNC: 138 MMOL/L (ref 133–144)
T4 FREE SERPL-MCNC: 0.84 NG/DL (ref 0.76–1.46)
TRIGL SERPL-MCNC: 302 MG/DL
TSH SERPL DL<=0.005 MIU/L-ACNC: 4.94 MU/L (ref 0.4–4)

## 2017-12-27 NOTE — PROGRESS NOTES
12-27-17      mtm notes--- trigs and tsh off --recheck labs in 90 days --concentrate on diet and exercise.      mtm recommended adding back vits --d3-2,000 iu's/day and b-12 -500mcg./day .       Yash Ornelas Rph.  Medication Therapy Management Provider  [phone]

## 2017-12-27 NOTE — TELEPHONE ENCOUNTER
12-27-17      mt notes she will start vits d and b-12 and extra venlafaxine.    Yash Ornelas Rph.  Medication Therapy Management Provider  [phone]

## 2018-03-09 DIAGNOSIS — E11.65 TYPE 2 DIABETES MELLITUS WITH HYPERGLYCEMIA, WITHOUT LONG-TERM CURRENT USE OF INSULIN (H): ICD-10-CM

## 2018-03-09 NOTE — TELEPHONE ENCOUNTER
Requested Prescriptions   Pending Prescriptions Disp Refills     metFORMIN (GLUCOPHAGE-XR) 500 MG 24 hr tablet [Pharmacy Med Name: METFORMIN ER 500MG 24HR TABS]  Last Written Prescription Date:  11/15/17  Last Fill Quantity: 360 TABLET,  # refills: 0   Last office visit: 11/13/17 with prescribing provider:  JUSTIN   Future Office Visit:   Next 5 appointments (look out 90 days)     Jun 06, 2018  4:30 PM CDT   SHORT with Yash Ornelas RPH   [facility]    [address]  [place] [address]   [phone]                  360 tablet 0     Sig: TAKE 2 TABLETS BY MOUTH TWICE DAILY WITH MEALS    Biguanide Agents Passed    3/9/2018 12:45 PM       Passed - Blood pressure less than 140/90 in past 6 months    BP Readings from Last 3 Encounters:   12/26/17 126/84   11/13/17 122/60   06/20/17 120/76                Passed - Patient has documented LDL within the past 12 mos.    Recent Labs   Lab Test  12/26/17   1329   LDL  85            Passed - Patient has had a Microalbumin in the past 12 mos.    Recent Labs   Lab Test  12/26/17   1330   MICROL  <5   UMALCR  Unable to calculate due to low value            Passed - Patient is age 10 or older       Passed - Patient has documented A1c within the specified period of time.    Recent Labs   Lab Test  12/26/17   1329   A1C  6.9*            Passed - Patient's CR is NOT>1.4 OR Patient's EGFR is NOT<45 within past 12 mos.    Recent Labs   Lab Test  12/26/17   1329   GFRESTIMATED  >90   GFRESTBLACK  >90       Recent Labs   Lab Test  12/26/17   1329   CR  0.61            Passed - Patient does NOT have a diagnosis of CHF.       Passed - Patient is not pregnant       Passed - Patient has not had a positive pregnancy test within the past 12 mos.        Passed - Recent (6 mo) or future (30 days) visit within the authorizing provider's specialty    Patient had office visit in the last 6 months or has a visit in the  "next 30 days with authorizing provider or within the authorizing provider's specialty.  See \"Patient Info\" tab in inbasket, or \"Choose Columns\" in Meds & Orders section of the refill encounter.              "

## 2018-03-12 RX ORDER — METFORMIN HCL 500 MG
TABLET, EXTENDED RELEASE 24 HR ORAL
Qty: 360 TABLET | Refills: 0 | Status: SHIPPED | OUTPATIENT
Start: 2018-03-12 | End: 2018-04-18

## 2018-03-12 NOTE — TELEPHONE ENCOUNTER
TC--Please call and schedule an appt. --overdue for annual appt.   LOV: 3/8/2017    Prescription approved per [facility] Refill Protocol.  Thanks! Faina Ortiz RN

## 2018-03-22 ENCOUNTER — OFFICE VISIT (OUTPATIENT)
Dept: URGENT CARE | Facility: URGENT CARE | Age: 58
End: 2018-03-22
Payer: OTHER MISCELLANEOUS

## 2018-03-22 ENCOUNTER — RADIANT APPOINTMENT (OUTPATIENT)
Dept: GENERAL RADIOLOGY | Facility: CLINIC | Age: 58
End: 2018-03-22
Attending: INTERNAL MEDICINE
Payer: OTHER MISCELLANEOUS

## 2018-03-22 VITALS
BODY MASS INDEX: 32.2 KG/M2 | TEMPERATURE: 97.8 F | HEART RATE: 84 BPM | WEIGHT: 175 LBS | HEIGHT: 62 IN | SYSTOLIC BLOOD PRESSURE: 145 MMHG | DIASTOLIC BLOOD PRESSURE: 86 MMHG | OXYGEN SATURATION: 97 %

## 2018-03-22 DIAGNOSIS — S99.921A FOOT INJURY, RIGHT, INITIAL ENCOUNTER: ICD-10-CM

## 2018-03-22 DIAGNOSIS — S92.324A CLOSED NONDISPLACED FRACTURE OF SECOND METATARSAL BONE OF RIGHT FOOT, INITIAL ENCOUNTER: Primary | ICD-10-CM

## 2018-03-22 DIAGNOSIS — S92.334A CLOSED NONDISPLACED FRACTURE OF THIRD METATARSAL BONE OF RIGHT FOOT, INITIAL ENCOUNTER: ICD-10-CM

## 2018-03-22 PROCEDURE — 73630 X-RAY EXAM OF FOOT: CPT | Mod: RT

## 2018-03-22 PROCEDURE — 99214 OFFICE O/P EST MOD 30 MIN: CPT | Performed by: INTERNAL MEDICINE

## 2018-03-22 RX ORDER — NAPROXEN 500 MG/1
500 TABLET ORAL 2 TIMES DAILY WITH MEALS
Qty: 60 TABLET | Refills: 0 | Status: SHIPPED | OUTPATIENT
Start: 2018-03-22 | End: 2018-03-26

## 2018-03-22 NOTE — PROGRESS NOTES
SUBJECTIVE:   Inger Wegener is a 57 year old female presenting with a chief complaint of   Chief Complaint   Patient presents with     Urgent Care     Pt in clinic to have eval for right foot/ ankle pain due to work injury.     Musculoskeletal Problem       She is an established patient of [facility].    Onset of symptoms was 3 hour(s) ago.  Location: right ankle and foot  Context:       The injury happened while at work      Mechanism: playing nerf balls in special ed class,  She felt shock from knee to foot, rolled right ankle in & fell      Patient experienced immediate pain, inability to bear weight directly after injury, no deformity was noted by the patient  Current and Associated symptoms: Pain  Aggravating Factors: weight-bearing  Therapies to improve symptoms include: ice and elevation    hx fracture in R ankle avulsion fracture  Review of Systems    No past medical history on file.  Family History   Problem Relation Age of Onset     Eye Disorder Mother      Hypertension Mother      Thyroid Disease Mother      Hearing Loss Father      DIABETES Maternal Grandmother      Current Outpatient Prescriptions   Medication Sig Dispense Refill     naproxen (NAPROSYN) 500 MG tablet Take 1 tablet (500 mg) by mouth 2 times daily (with meals) 60 tablet 0     simvastatin (ZOCOR) 20 MG tablet TAKE 1 TABLET BY MOUTH EVERY NIGHT AT BEDTIME 90 tablet 1     hydrochlorothiazide (HYDRODIURIL) 25 MG tablet TAKE 1 TABLET BY MOUTH EVERY DAY 90 tablet 1     metFORMIN (GLUCOPHAGE-XR) 500 MG 24 hr tablet TAKE 2 TABLETS BY MOUTH TWICE DAILY WITH MEALS 360 tablet 0     blood glucose (ACCU-CHEK FASTCLIX) lancing device Device to be used with lancets. 1 each 0     fluticasone (FLONASE) 50 MCG/ACT spray SHAKE LIQUID AND USE 1 TO 2 SPRAYS IN EACH NOSTRIL DAILY 48 mL 0     blood glucose monitoring (ACCU-CHEK SMARTVIEW) test strip Use to test blood sugar 2 times daily or as directed.  Ok to substitute alternative if insurance prefers.  Profile  "Rx: patient will contact pharmacy when needed 180 each 1     venlafaxine (EFFEXOR-XR) 37.5 MG 24 hr capsule Take 3 capsules (112.5 mg) by mouth daily (Patient taking differently: Take 75 mg by mouth daily ) 270 capsule 3     blood glucose monitoring (ACCU-CHEK FASTCLIX) lancets Use to test blood sugar 2 times daily or as directed.  Ok to substitute alternative if insurance prefers. 3 Box 3     order for DME Equipment being ordered: Compression stocking, medium compression 2 Units 11     ASPIRIN NOT PRESCRIBED (INTENTIONAL) continuous prn for other Reported on 3/8/2017       Ibuprofen (ADVIL PO) Take 400 mg by mouth as needed        Social History   Substance Use Topics     Smoking status: Passive Smoke Exposure - Never Smoker     Smokeless tobacco: Never Used     Alcohol use Yes       OBJECTIVE  /86  Pulse 84  Temp 97.8  F (36.6  C) (Tympanic)  Ht 5' 2\" (1.575 m)  Wt 175 lb (79.4 kg)  SpO2 97%  BMI 32.01 kg/m2    Physical Exam   Constitutional: She appears well-developed and well-nourished.       right lower extremity;  Knee nontender   Tib/fib nontender    right ankle has no erythema, ecchymosis,  or edema.  No tenderness over the medial or lateral aspect of the ankle or the medial ligaments.    The tarsal bridge of bones are tender  The 3,4,5th metatarsals are very tender - she pulls away her foot with exam.    Exam of toes normal   No swelling or ecchymosis of feet=  Labs:  No results found for this or any previous visit (from the past 24 hour(s)).    X-Ray was done, my findings are: fractures 2,3 metatarsals      ASSESSMENT:      ICD-10-CM    1. Closed nondisplaced fracture of second metatarsal bone of right foot, initial encounter S92.324A ORTHO  REFERRAL   2. Closed nondisplaced fracture of third metatarsal bone of right foot, initial encounter S92.334A ORTHO  REFERRAL   3. Foot injury, right, initial encounter S99.921A XR Foot Right G/E 3 Views     naproxen (NAPROSYN) 500 MG " tablet        Medical Decision Making:    Differential Diagnosis:  sprain and fracture    Serious Comorbid Conditions:  Adult:  None    PLAN:      Patient Instructions   Wear boot, crutch walk  Naprosyn 2 x day with food  Elevate  Ice    See Podiatry for foot fracture care    Vitamin D and calcium for bone healing.    Call or return to clinic if symptoms worsen or fail to improve as anticipated.        Foot Fracture  You have a broken bone (fracture) in your foot. This will cause pain, swelling, and often bruising. It will usually take about 4 to 8 weeks to heal. A foot fracture may be treated with a special shoe, splint, cast, or boot.  Home care  Follow these guidelines when caring for yourself at home:    You may be given a splint, cast, shoe, or boot to keep the injured area from moving. Unless you were told otherwise, use crutches or a walker. Don t put weight on the injured foot until your health care provider says you can do so. (You can rent crutches and a walker at many pharmacies and surgical or orthopedic supply stores.) Don t put weight on a splint, or it will break.    Keep your leg elevated to reduce pain and swelling. When sleeping, put a pillow under the injured leg. When sitting, support the injured leg so it is above your waist. This is very important during the first 2 days (48 hours).    Put an ice pack on the injured area. Do this for 20 minutes every 1 to 2 hours the first day for pain relief. You can make an ice pack by wrapping a plastic bag of ice cubes in a thin towel. As the ice melts, be careful that the splint, cast, boot, or shoe doesn t get wet. You can place the ice pack directly over the splint or cast. Unless told otherwise, you can open the boot or shoe to apply the ice pack. Continue using the ice pack 3 to 4 times a day for the next 2 days. Then use the ice pack as needed to ease pain and swelling.    Keep the splint, cast, boot, or shoe dry. When bathing, protect it with a large  plastic bag, rubber-banded at the top end. If a fiberglass splint or cast or boot gets wet, you can dry it with a hair dryer. Unless told otherwise, you can take off the boot or shoe to bathe.    You may use acetaminophen or ibuprofen to control pain, unless another pain medicine was prescribed. If you have chronic liver or kidney disease, talk with your healthcare provider before using these medicines. Also talk with your provider if you ve had a stomach ulcer or gastrointestinal bleeding.    Don t put creams or objects under the cast if you have itching.  Follow-up care  Follow up with your healthcare provider, or as advised. This is to make sure the bone is healing the way it should. If you were given a splint, it may be changed to a cast or boot at your follow-up visit.  X-rays may be taken. You will be told of any new findings that may affect your care.  When to seek medical advice  Call your healthcare provider right away if any of these occur:    The cast or splint cracks    The plaster cast or splint becomes wet or soft    The fiberglass cast or splint stays wet for more than 24 hours    Bad odor from the cast or wound fluid stains the cast    Tightness or pain under the cast or splint gets worse    Toes become swollen, cold, blue, numb, or tingly    You can t move your toes    Skin around cast or splint becomes red    Fever of 100.4 F (38 C) or higher, or as directed by your healthcare provider  Date Last Reviewed: 2/1/2017 2000-2017 [employer]. [address] [place] [address], [place] [address]. All rights reserved. This information is not intended as a substitute for professional medical care. Always follow your healthcare professional's instructions.

## 2018-03-22 NOTE — MR AVS SNAPSHOT
After Visit Summary   3/22/2018    Inger Wegener    MRN: [id number]           Patient Information     Date Of Birth          1960        Visit Information        Provider Department      3/22/2018 5:05 PM Maritza Garcia MD [facility]        Today's Diagnoses     Closed nondisplaced fracture of second metatarsal bone of right foot, initial encounter    -  1    Closed nondisplaced fracture of third metatarsal bone of right foot, initial encounter        Foot injury, right, initial encounter          Care Instructions    Wear boot, crutch walk  Naprosyn 2 x day with food  Elevate  Ice    See Podiatry for foot fracture care    Vitamin D and calcium for bone healing.    Call or return to clinic if symptoms worsen or fail to improve as anticipated.        Foot Fracture  You have a broken bone (fracture) in your foot. This will cause pain, swelling, and often bruising. It will usually take about 4 to 8 weeks to heal. A foot fracture may be treated with a special shoe, splint, cast, or boot.  Home care  Follow these guidelines when caring for yourself at home:    You may be given a splint, cast, shoe, or boot to keep the injured area from moving. Unless you were told otherwise, use crutches or a walker. Don t put weight on the injured foot until your health care provider says you can do so. (You can rent crutches and a walker at many pharmacies and surgical or orthopedic supply stores.) Don t put weight on a splint, or it will break.    Keep your leg elevated to reduce pain and swelling. When sleeping, put a pillow under the injured leg. When sitting, support the injured leg so it is above your waist. This is very important during the first 2 days (48 hours).    Put an ice pack on the injured area. Do this for 20 minutes every 1 to 2 hours the first day for pain relief. You can make an ice pack by wrapping a plastic bag of ice cubes in a thin towel. As the ice melts, be  careful that the splint, cast, boot, or shoe doesn t get wet. You can place the ice pack directly over the splint or cast. Unless told otherwise, you can open the boot or shoe to apply the ice pack. Continue using the ice pack 3 to 4 times a day for the next 2 days. Then use the ice pack as needed to ease pain and swelling.    Keep the splint, cast, boot, or shoe dry. When bathing, protect it with a large plastic bag, rubber-banded at the top end. If a fiberglass splint or cast or boot gets wet, you can dry it with a hair dryer. Unless told otherwise, you can take off the boot or shoe to bathe.    You may use acetaminophen or ibuprofen to control pain, unless another pain medicine was prescribed. If you have chronic liver or kidney disease, talk with your healthcare provider before using these medicines. Also talk with your provider if you ve had a stomach ulcer or gastrointestinal bleeding.    Don t put creams or objects under the cast if you have itching.  Follow-up care  Follow up with your healthcare provider, or as advised. This is to make sure the bone is healing the way it should. If you were given a splint, it may be changed to a cast or boot at your follow-up visit.  X-rays may be taken. You will be told of any new findings that may affect your care.  When to seek medical advice  Call your healthcare provider right away if any of these occur:    The cast or splint cracks    The plaster cast or splint becomes wet or soft    The fiberglass cast or splint stays wet for more than 24 hours    Bad odor from the cast or wound fluid stains the cast    Tightness or pain under the cast or splint gets worse    Toes become swollen, cold, blue, numb, or tingly    You can t move your toes    Skin around cast or splint becomes red    Fever of 100.4 F (38 C) or higher, or as directed by your healthcare provider  Date Last Reviewed: 2/1/2017 2000-2017 [employer]. [address] [place] [address].  All rights reserved. This information is not intended as a substitute for professional medical care. Always follow your healthcare professional's instructions.                Follow-ups after your visit        Additional Services     ORTHO  REFERRAL       [facility] is referring you to the Orthopedic  Services at [facility].       The  Representative will assist you in the coordination of your Orthopedic and Musculoskeletal Care as prescribed by your physician.    The  Representative will call you within 1 business day to help schedule your appointment, or you may contact the  Representative at:    All areas ~ [phone]     Type of Referral : [facility]       Timeframe requested: 1 - 2 days    Coverage of these services is subject to the terms and limitations of your health insurance plan.  Please call member services at your health plan with any benefit or coverage questions.      If X-rays, CT or MRI's have been performed, please contact the facility where they were done to arrange for , prior to your scheduled appointment.  Please bring this referral request to your appointment and present it to your specialist.                  Your next 10 appointments already scheduled     Jun 06, 2018  4:30 PM CDT   SHORT with Yash Ornelas RPH   [facility] ([facility])    [address] [place] [address] [phone]              Who to contact     If you have questions or need follow up information about today's clinic visit or your schedule please contact [facility] directly at [phone].  Normal or non-critical lab and imaging results will be communicated to you by MyChart, letter or phone within 4 business days after the clinic has received the results. If you do not hear from us within 7 days, please  "contact the clinic through [employer] or phone. If you have a critical or abnormal lab result, we will notify you by phone as soon as possible.  Submit refill requests through [employer] or call your pharmacy and they will forward the refill request to us. Please allow 3 business days for your refill to be completed.          Additional Information About Your Visit        [employer] Information     [employer] gives you secure access to your electronic health record. If you see a primary care provider, you can also send messages to your care team and make appointments. If you have questions, please call your primary care clinic.  If you do not have a primary care provider, please call [phone] and they will assist you.        Care EveryWhere ID     This is your Care EveryWhere ID. This could be used by other organizations to access your [facility] medical records  [id number]        Your Vitals Were     Pulse Temperature Height Pulse Oximetry BMI (Body Mass Index)       84 97.8  F (36.6  C) (Tympanic) 5' 2\" (1.575 m) 97% 32.01 kg/m2        Blood Pressure from Last 3 Encounters:   03/22/18 145/86   12/26/17 126/84   11/13/17 122/60    Weight from Last 3 Encounters:   03/22/18 175 lb (79.4 kg)   12/26/17 173 lb (78.5 kg)   11/13/17 169 lb (76.7 kg)              We Performed the Following     ORTHO  REFERRAL          Today's Medication Changes          These changes are accurate as of 3/22/18  6:01 PM.  If you have any questions, ask your nurse or doctor.               Start taking these medicines.        Dose/Directions    naproxen 500 MG tablet   Commonly known as:  NAPROSYN   Used for:  Foot injury, right, initial encounter   Started by:  Maritza Garcia MD        Dose:  500 mg   Take 1 tablet (500 mg) by mouth 2 times daily (with meals)   Quantity:  60 tablet   Refills:  0         These medicines have changed or have updated prescriptions.        Dose/Directions    venlafaxine 37.5 MG 24 hr capsule "   Commonly known as:  EFFEXOR-XR   This may have changed:  how much to take   Used for:  Adjustment disorder with mixed anxiety and depressed mood        Dose:  112.5 mg   Take 3 capsules (112.5 mg) by mouth daily   Quantity:  270 capsule   Refills:  3            Where to get your medicines      These medications were sent to [id number] - [place], [place] - [address] AT [facility]  [address], [place] [address]    Hours:  24-hours Phone:  [phone]     naproxen 500 MG tablet                Primary Care Provider Office Phone # Fax #    Diane Navarrobaldev, APRN [facility] [phone] [address] [place] [address]        Equal Access to Services     MARIBEL HOOD AH: Hadii leslie hsu hadasho Soomaali, waaxda luqadaha, qaybta kaalmada adeegyada, waxay laura haysupa edwards . So [facility] [phone].    ATENCIÓN: Si habla español, tiene a bullard disposición servicios gratuitos de asistencia lingüística. Rodney al [phone].    We comply with applicable federal civil rights laws and [place] laws. We do not discriminate on the basis of race, color, national origin, age, disability, sex, sexual orientation, or gender identity.            Thank you!     Thank you for choosing [facility]  for your care. Our goal is always to provide you with excellent care. Hearing back from our patients is one way we can continue to improve our services. Please take a few minutes to complete the written survey that you may receive in the mail after your visit with us. Thank you!             Your Updated Medication List - Protect others around you: Learn how to safely use, store and throw away your medicines at www.disposemymeds.org.          This list is accurate as of 3/22/18  6:01 PM.  Always use your most recent med list.                   Brand Name Dispense Instructions for use Diagnosis    ADVIL PO      Take 400 mg by mouth  as needed        ASPIRIN NOT PRESCRIBED    INTENTIONAL     continuous prn for other Reported on 3/8/2017        blood glucose lancing device     1 each    Device to be used with lancets.    Type 2 diabetes mellitus with hyperglycemia, without long-term current use of insulin (H)       blood glucose monitoring lancets     3 Box    Use to test blood sugar 2 times daily or as directed.  Ok to substitute alternative if insurance prefers.    Type 2 diabetes mellitus with hyperglycemia, without long-term current use of insulin (H)       blood glucose monitoring test strip    ACCU-CHEK SMARTVIEW    180 each    Use to test blood sugar 2 times daily or as directed.  Ok to substitute alternative if insurance prefers. Profile Rx: patient will contact pharmacy when needed    Type 2 diabetes mellitus with hyperglycemia, without long-term current use of insulin (H)       fluticasone 50 MCG/ACT spray    FLONASE    48 mL    SHAKE LIQUID AND USE 1 TO 2 SPRAYS IN EACH NOSTRIL DAILY    Acute sinusitis with symptoms > 10 days       hydrochlorothiazide 25 MG tablet    HYDRODIURIL    90 tablet    TAKE 1 TABLET BY MOUTH EVERY DAY    Hypertension goal BP (blood pressure) < 140/90       metFORMIN 500 MG 24 hr tablet    GLUCOPHAGE-XR    360 tablet    TAKE 2 TABLETS BY MOUTH TWICE DAILY WITH MEALS    Type 2 diabetes mellitus with hyperglycemia, without long-term current use of insulin (H)       naproxen 500 MG tablet    NAPROSYN    60 tablet    Take 1 tablet (500 mg) by mouth 2 times daily (with meals)    Foot injury, right, initial encounter       order for DME     2 Units    Equipment being ordered: Compression stocking, medium compression    Type 2 diabetes mellitus with hyperglycemia, without long-term current use of insulin (H)       simvastatin 20 MG tablet    ZOCOR    90 tablet    TAKE 1 TABLET BY MOUTH EVERY NIGHT AT BEDTIME    Hyperlipidemia with target LDL less than 130       venlafaxine 37.5 MG 24 hr capsule    EFFEXOR-XR    270  capsule    Take 3 capsules (112.5 mg) by mouth daily    Adjustment disorder with mixed anxiety and depressed mood

## 2018-03-22 NOTE — NURSING NOTE
"Chief Complaint   Patient presents with     Urgent Care     Pt in clinic to have eval for right foot/ ankle pain due to work injury.     Musculoskeletal Problem       Initial /86  Pulse 84  Temp 97.8  F (36.6  C) (Tympanic)  Ht 1.575 m (5' 2\")  Wt 79.4 kg (175 lb)  SpO2 97%  BMI 32.01 kg/m2 Estimated body mass index is 32.01 kg/(m^2) as calculated from the following:    Height as of this encounter: 1.575 m (5' 2\").    Weight as of this encounter: 79.4 kg (175 lb).  Medication Reconciliation: complete   Luisa Gallegos/ MA    "

## 2018-03-26 ENCOUNTER — OFFICE VISIT (OUTPATIENT)
Dept: PODIATRY | Facility: CLINIC | Age: 58
End: 2018-03-26
Payer: OTHER MISCELLANEOUS

## 2018-03-26 VITALS
WEIGHT: 174 LBS | OXYGEN SATURATION: 95 % | HEART RATE: 85 BPM | DIASTOLIC BLOOD PRESSURE: 78 MMHG | TEMPERATURE: 97.5 F | SYSTOLIC BLOOD PRESSURE: 127 MMHG | BODY MASS INDEX: 31.83 KG/M2

## 2018-03-26 DIAGNOSIS — Z51.81 MEDICATION MONITORING ENCOUNTER: Primary | ICD-10-CM

## 2018-03-26 DIAGNOSIS — S99.921A FOOT INJURY, RIGHT, INITIAL ENCOUNTER: ICD-10-CM

## 2018-03-26 DIAGNOSIS — F43.23 ADJUSTMENT DISORDER WITH MIXED ANXIETY AND DEPRESSED MOOD: ICD-10-CM

## 2018-03-26 DIAGNOSIS — S92.901A CLOSED FRACTURE OF RIGHT FOOT, INITIAL ENCOUNTER: Primary | ICD-10-CM

## 2018-03-26 PROCEDURE — 99214 OFFICE O/P EST MOD 30 MIN: CPT | Performed by: PODIATRIST

## 2018-03-26 NOTE — MR AVS SNAPSHOT
After Visit Summary   3/26/2018    Inger Wegener    MRN: [id number]           Patient Information     Date Of Birth          1960        Visit Information        Provider Department      3/26/2018 7:15 AM Yeyo Davis DPM [facility]        Today's Diagnoses     Closed fracture of right foot, initial encounter    -  1      Care Instructions    No weight bearing on injured foot  Wear CAM boot  Follow up in 2 weeks      [facility]  [place]  [address]   [place] [address]  Ph: [phone]  Fax: [phone] [facility]  [address] [place] [address]  [place] [address]  Ph: [phone]  Fax: [phone]   [place]  [address]   [place] [address]  Ph: [phone]  Fax: [phone] [place]  [address] [place] [address]   [place] [address]  Ph: [phone]  Fax: [phone]     *OTHER STORE OPTIONS OUTSIDE OF [facility]*  [facility]   [address] [place], [place] [id number] [phone] [facility]  [address] [place], [place] [address] [phone] [facility]  [address]  [place], [place] [address] [phone]         TOE & METATARSAL FRACTURES  The structure of the foot is complex, consisting of bones, muscles, tendons, and other soft tissues. Of the 26 bones in the foot, 19 are toe bones (phalanges) and metatarsal bones (the long bones in the midfoot). Fractures of the toe and metatarsal bones are common and require evaluation by a specialist. A foot and ankle surgeon should be seen for proper diagnosis and treatment, even if initial treatment has been received in an emergency room.  A fracture is a break in the bone. Fractures can be divided into two categories: traumatic fractures and stress fractures.  TRAMATIC FRACTURES (also called acute fractures) are caused by a direct blow or impact, such as seriously stubbing your toe. Traumatic fractures can be displaced or non-displaced. If  the fracture is displaced, the bone is broken in such a way that it has changed in position (dislocated).  Signs and symptoms of a traumatic fracture include:  You may hear a sound at the time of the break.    Pinpoint pain  (pain at the place of impact) at the time the fracture occurs and perhaps for a few hours later, but often the pain goes away after several hours.   Crooked or abnormal appearance of the toe.   Bruising and swelling the next day.   It is not true that  if you can walk on it, it s not broken.  Evaluation by a foot and ankle surgeon is always recommended.   STRESS FRACTURES are tiny, hairline breaks that are usually caused by repetitive stress. Stress fractures often afflict athletes who, for example, too rapidly increase their running mileage. They can also be caused by an abnormal foot structure, deformities, or osteoporosis. Improper footwear may also lead to stress fractures. Stress fractures should not be ignored. They require proper medical attention to heal correctly.  Symptoms of stress fractures include:  Pain with or after normal activity   Pain that goes away when resting and then returns when standing or during activity    Pinpoint pain  (pain at the site of the fracture) when touched   Swelling, but no bruising   IMPROPER TREATMENT  Some people say that  the doctor can t do anything for a broken bone in the foot.  This is usually not true. In fact, if a fractured toe or metatarsal bone is not treated correctly, serious complications may develop. For example:  A deformity in the bony architecture which may limit the ability to move the foot or cause difficulty in fitting shoes   Arthritis, which may be caused by a fracture in a joint (the juncture where two bones meet), or may be a result of angular deformities that develop when a displaced fracture is severe or hasn t been properly corrected   Chronic pain and deformity   Non-union, or failure to heal, can lead to subsequent surgery  or chronic pain.   PROPER TREATMENT FOR TOES  Fractures of the toe bones are almost always traumatic fractures. Treatment for traumatic fractures depends on the break itself and may include these options:  Rest. Sometimes rest is all that is needed to treat a traumatic fracture of the toe.   Splinting. The toe may be fitted with a splint to keep it in a fixed position.   Rigid or stiff-soled shoe. Wearing a stiff-soled shoe protects the toe and helps keep it properly positioned.    Ben taping  the fractured toe to another toe is sometimes appropriate, but in other cases it may be harmful.   Surgery. If the break is badly displaced or if the joint is affected, surgery may be necessary. Surgery often involves the use of fixation devices, such as pins.   PROPER TREATMENT OF METATARSALS  Breaks in the metatarsal bones may be either stress or traumatic fractures. Certain kinds of fractures of the metatarsal bones present unique challenges.  For example, sometimes a fracture of the first metatarsal bone (behind the big toe) can lead to arthritis. Since the big toe is used so frequently and bears more weight than other toes, arthritis in that area can make it painful to walk, bend, or even stand.  Another type of break, called a Rodney fracture, occurs at the base of the fifth metatarsal bone (behind the little toe). It is often misdiagnosed as an ankle sprain, and misdiagnosis can have serious consequences since sprains and fractures require different treatments. Your foot and ankle surgeon is an expert in correctly identifying these conditions as well as other problems of the foot.  Treatment of metatarsal fractures depends on the type and extent of the fracture, and may include:  Rest. Sometimes rest is the only treatment needed to promote healing of a stress or traumatic fracture of a metatarsal bone.   Avoid the offending activity. Because stress fractures result from repetitive stress, it is important to avoid the  activity that led to the fracture. Crutches or a wheelchair are sometimes required to offload weight from the foot to give it time to heal.   Immobilization, casting, or rigid shoe. A stiff-soled shoe or other form of immobilization may be used to protect the fractured bone while it is healing.   Surgery. Some traumatic fractures of the metatarsal bones require surgery, especially if the break is badly displaced.   Follow-up care. Your foot and ankle surgeon will provide instructions for care following surgical or non-surgical treatment. Physical therapy, exercises and rehabilitation may be included in a schedule for return to normal activities.       PRICE Therapy    Many aches and pains throughout the foot and ankle can be helped with many simple treatments.  This is usually described as PRICE Therapy.      P - Protection - often times, inflammation/pain in the lower extremity is not able to improve simply because the areas involved are never allowed to rest.  Every step we take can bother the problematic area.  Protecting those areas is an important step in the healing process.  This may involve a walking cast boot, a special insert/orthotic device, an ankle brace, or simply avoiding barefoot walking.    R - Rest - in addition to protecting the foot/ankle, resting is an important, but often times difficult, treatment option.  Getting off your feet when they bother you, and specifically avoiding activities that cause pain/discomfort, are very beneficial to prevent, and treat, foot/ankle pain.      I - Ice - icing regularly can help to decrease inflammation and swelling in the foot, thus decreasing pain.  Using an ice pack or a bag of frozen peas works very well.  Ice for 20 minutes multiple times per day as needed.  Do not place the ice directly on the skin as this can cause tissue damage.    C - Compression - using a compression wrap or an ACE wrap can help to decrease swelling, which can help to decrease pain.   Wearing the wraps is generally not needed at night, but they should be worn on a regular basis when you are going to be on your feet for prolonged periods as gravity tends to pull fluids down to your feet/ankles.    E - Elevation - elevating your lower extremities multiple times daily for 15-20 minutes can help to decrease swelling, which works well in decreasing pain levels.      BLOOD CLOT PREVENTION - WEARING A CAST BOOT    Do not drive with the CAM boot  Do not wear during long-distance travel: long car rides / plane trips  Wear the boot when moving, regardless of your weight-bearing status    Any brace that extends up to the knee can increase your chance of developing a blood clot. Blood clots generally occur in the large veins of your calf, thigh, or abdomen. A blood clot may form when blood is stagnant in the veins while your leg is immobilized in the brace. Moy and relaxing the calf muscles by moving the ankle is the best method to avoid stagnant blood. Clarify with your doctor if you should be doing this as this may not be recommended for certain tendon surgeries.    Blood clots or deep venous thrombosis (DVT) can be life threatening if a portion of the clot breaks away and travels through the veins to your lungs. This is called a pulmonary embolism (PE) which can result in death.    Symptoms of a DVT may include swelling, tenderness, a warm feeling or redness in the leg. DVT can occur in both legs, even if only one side is in a brace. If you have these symptoms, you should call your doctor immediately or go to the Emergency Room to have your leg scanned.     Symptoms of a pulmonary embolism (PE) include chest pain, shortness of breath or the need to breath rapidly that is not associated with exercise, difficulty breathing, rapid heart rate, coughing or coughing up blood, or a feeling of passing out. Chest pain can range from mild to knife-like pain while taking a deep breath. Pulmonary embolism can  occur without any symptoms whatsoever. You need to be evaluated in a hospital emergency room immediately if you have symptoms of a PE. Please call 911 as PE is a life-threatening emergency.    Be certain that you understand how much ankle range of motion is allowed. Your foot and ankle problem is being immobilized by the brace, yet we do not want you to develop a blood clot. The velcro strap braces are intended to be removed for periodic exercise of the ankle. Your doctor will tell you if it is okay to do this depending on the type of surgery or injury you had.    Your own personal risk of developing a DVT or PE is dependent on many factors. A personal or family history of previous blood clot or a clotting disorder (such as thrombophilia) puts you at risk. Other risk factors include history of cancer, current use of estrogen medications (such as birth control pills or post menopausal medications), recent trauma or fracture, diabetes, recent heart attack, COPD, heart failure, pregnancy, obesity, advanced age, smoking, etc. Please make sure your doctor is aware if you have any of these risk factors.      Body Mass Index (BMI)  Many things can cause foot and ankle problems. Foot structure, activity level, foot mechanics and injuries are common causes of pain.  One very important issue that often goes unmentioned is body weight. Extra weight can cause increased stress on muscles, ligaments, bones and tendons. Sometimes just a few extra pounds is all it takes to put one over her/his threshold. Without reducing that stress, it can be difficult to alleviate pain.   Some people are uncomfortable addressing this issue, but we feel it is important for you to think about it. As Foot & Ankle specialists, our job is addressing the lower extremity problem and possible causes.   Regarding extra body weight, we encourage patients to discuss diet and weight management plans with their primary care doctors. It is this team approach  that gives you the best opportunity for pain relief and getting you back on your feet.               Follow-ups after your visit        Follow-up notes from your care team     Return in about 2 weeks (around 4/9/2018).      Your next 10 appointments already scheduled     Jun 06, 2018  4:30 PM CDT   SHORT with Yash Ornelas RPH   [facility] ([facility])    [address] [place] [address]  [place] [address] [phone]              Who to contact     If you have questions or need follow up information about today's clinic visit or your schedule please contact [facility] directly at [phone].  Normal or non-critical lab and imaging results will be communicated to you by MyChart, letter or phone within 4 business days after the clinic has received the results. If you do not hear from us within 7 days, please contact the clinic through [employer] or phone. If you have a critical or abnormal lab result, we will notify you by phone as soon as possible.  Submit refill requests through [employer] or call your pharmacy and they will forward the refill request to us. Please allow 3 business days for your refill to be completed.          Additional Information About Your Visit        MyChart Information     [employer] gives you secure access to your electronic health record. If you see a primary care provider, you can also send messages to your care team and make appointments. If you have questions, please call your primary care clinic.  If you do not have a primary care provider, please call [phone] and they will assist you.        Care EveryWhere ID     This is your Care EveryWhere ID. This could be used by other organizations to access your [facility] medical records  [id number]        Your Vitals Were     Pulse Temperature Pulse Oximetry BMI (Body Mass Index)          85 97.5  F (36.4  C) (Oral) 95% 31.83 kg/m2         Blood Pressure from Last 3  Encounters:   03/26/18 127/78   03/22/18 145/86   12/26/17 126/84    Weight from Last 3 Encounters:   03/26/18 174 lb (78.9 kg)   03/22/18 175 lb (79.4 kg)   12/26/17 173 lb (78.5 kg)              Today, you had the following     No orders found for display         Today's Medication Changes          These changes are accurate as of 3/26/18  7:39 AM.  If you have any questions, ask your nurse or doctor.               Start taking these medicines.        Dose/Directions    order for DME   Used for:  Closed fracture of right foot, initial encounter   Started by:  Yeyo Davis DPM        Knee Walker/rollabout Length of use: Three months   Quantity:  1 Device   Refills:  0         These medicines have changed or have updated prescriptions.        Dose/Directions    venlafaxine 37.5 MG 24 hr capsule   Commonly known as:  EFFEXOR-XR   This may have changed:  how much to take   Used for:  Adjustment disorder with mixed anxiety and depressed mood        Dose:  112.5 mg   Take 3 capsules (112.5 mg) by mouth daily   Quantity:  270 capsule   Refills:  3            Where to get your medicines      Some of these will need a paper prescription and others can be bought over the counter.  Ask your nurse if you have questions.     Bring a paper prescription for each of these medications     order for DME                Primary Care Provider Office Phone # Fax #    DANGELO [facility]du[facility] [phone] [address] [place] [address]        Equal Access to Services     MARIBEL HOOD AH: Hadii leslie ruedao Soabril, waaxda luqadaha, qaybta kaalmada adeegyada, fernanda lemos. So [facility] [phone].    ATENCIÓN: Si habla español, tiene a bullard disposición servicios gratuitos de asistencia lingüística. Llame al [phone].    We comply with applicable federal civil rights laws and [place] laws. We do not discriminate on the basis of race, color, national origin,  age, disability, sex, sexual orientation, or gender identity.            Thank you!     Thank you for choosing [facility]  for your care. Our goal is always to provide you with excellent care. Hearing back from our patients is one way we can continue to improve our services. Please take a few minutes to complete the written survey that you may receive in the mail after your visit with us. Thank you!             Your Updated Medication List - Protect others around you: Learn how to safely use, store and throw away your medicines at www.disposemymeds.org.          This list is accurate as of 3/26/18  7:39 AM.  Always use your most recent med list.                   Brand Name Dispense Instructions for use Diagnosis    ADVIL PO      Take 400 mg by mouth as needed        ASPIRIN NOT PRESCRIBED    INTENTIONAL     continuous prn for other Reported on 3/8/2017        blood glucose lancing device     1 each    Device to be used with lancets.    Type 2 diabetes mellitus with hyperglycemia, without long-term current use of insulin (H)       blood glucose monitoring lancets     3 Box    Use to test blood sugar 2 times daily or as directed.  Ok to substitute alternative if insurance prefers.    Type 2 diabetes mellitus with hyperglycemia, without long-term current use of insulin (H)       blood glucose monitoring test strip    ACCU-CHEK SMARTVIEW    180 each    Use to test blood sugar 2 times daily or as directed.  Ok to substitute alternative if insurance prefers. Profile Rx: patient will contact pharmacy when needed    Type 2 diabetes mellitus with hyperglycemia, without long-term current use of insulin (H)       fluticasone 50 MCG/ACT spray    FLONASE    48 mL    SHAKE LIQUID AND USE 1 TO 2 SPRAYS IN EACH NOSTRIL DAILY    Acute sinusitis with symptoms > 10 days       hydrochlorothiazide 25 MG tablet    HYDRODIURIL    90 tablet    TAKE 1 TABLET BY MOUTH EVERY DAY    Hypertension goal BP (blood pressure) <  140/90       metFORMIN 500 MG 24 hr tablet    GLUCOPHAGE-XR    360 tablet    TAKE 2 TABLETS BY MOUTH TWICE DAILY WITH MEALS    Type 2 diabetes mellitus with hyperglycemia, without long-term current use of insulin (H)       naproxen 500 MG tablet    NAPROSYN    60 tablet    Take 1 tablet (500 mg) by mouth 2 times daily (with meals)    Foot injury, right, initial encounter       order for DME     2 Units    Equipment being ordered: Compression stocking, medium compression    Type 2 diabetes mellitus with hyperglycemia, without long-term current use of insulin (H)       order for DME     1 Device    Knee Walker/rollabout Length of use: Three months    Closed fracture of right foot, initial encounter       simvastatin 20 MG tablet    ZOCOR    90 tablet    TAKE 1 TABLET BY MOUTH EVERY NIGHT AT BEDTIME    Hyperlipidemia with target LDL less than 130       venlafaxine 37.5 MG 24 hr capsule    EFFEXOR-XR    270 capsule    Take 3 capsules (112.5 mg) by mouth daily    Adjustment disorder with mixed anxiety and depressed mood

## 2018-03-26 NOTE — LETTER
[address] [address]  Phone: [phone]    March 26, 2018        Inger Wegener  [address] [facility] [address]          To whom it may concern:    RE: Inger Wegener    Patient was seen and treated today at our clinic.    She may return to work 4/2/18 with the following restrictions:  Non weight bearing on right foot in CAM boot with crutches or rollabout.  These restrictions are in effect until follow up on 4/9/18.    Please contact me for questions or concerns.      Sincerely,        Yeyo Davis DPM

## 2018-03-26 NOTE — LETTER
3/26/2018         RE: Inger Wegener  [address] [facility] [address]        Dear Colleague,    Thank you for referring your patient, Inger Wegener, to the [facility]. Please see a copy of my visit note below.    BMI is normal   Pat Wyman CMA         PATIENT HISTORY:  Inger Wegener is a 57 year old female who presents to clinic for a new right foot injury.  Pt fell 3/22/18 at work.  4-10/10 pain.  Seen at urgent care where metatarsal fractures were noted.  She is NWB in a boot.  Reports limping.  No fevers, chills.  Nonsmoker.  Works as a teacher.  Diabetic.  Family hx of DM.     EXAM:Vitals: /78  Pulse 85  Temp 97.5  F (36.4  C) (Oral)  Wt 174 lb (78.9 kg)  SpO2 95%  BMI 31.83 kg/m2  BMI= Body mass index is 31.83 kg/(m^2).    General appearance: Patient is alert and fully cooperative with history & exam.  No sign of distress is noted during the visit.     Dermatologic: Skin is intact to the right foot.  Mild dorsal bruising.  No paronychia or evidence of soft tissue infection is noted.     Vascular: DP & PT pulses are intact & regular on the right.  Mild right forefoot edema.  CFT and skin temperature are normal.     Neurologic: Lower extremity sensation is intact to light touch.  No evidence of weakness or contracture in the lower extremities.       Musculoskeletal: Pain to midshaft right 2nd and 3rd metatarsals.  No significant midfoot pain to suggest midfoot injury.  Patient is ambulatory with boot and crutches.  No gross ankle deformity noted.  No foot or ankle joint effusion is noted.    XRs of right foot reviewed with pt:    FOOT THREE VIEWS RIGHT  3/22/2018 6:03 PM      HISTORY: History of fracture in right ankle/avulsion fracture.  Foot  injury, right, initial encounter.     COMPARISON: None.         impression: Fractures of the mid second and third metatarsals noted.  These are essentially nondisplaced. No dislocation.      PAT HOWARD MD     ASSESSMENT: Right 2nd  and 3rd metatarsal fractures     PLAN:  Reviewed patient's chart in epic.  Discussed condition and treatment options including pros and cons.    Treatment options for the fractures were discussed.  Non-operative treatment would involve a period of immobilization, rest, bracing or casting and edema control.  There is potential for the fracture to heal without surgery yet there may still be a need for delayed surgery.  There is potential for non-union with any fracture    A decision was made to pursue nonoperative care.    NWB in boot advised.  RICE.  Use crutches or rollabout.    F/u in 2 wks.    We discussed immobilization and the risk of blood clot. Let raises, knee bends, ankle ROm out of boot several times a day is ok.  Patient to seek medical attention if calf swelling and/or pain, chest pain, shortness of breath.      Yeyo Davis DPM, FACFAS    Weight management plan: Patient was referred to their PCP to discuss a diet and exercise plan.        Again, thank you for allowing me to participate in the care of your patient.        Sincerely,        Yeyo Davis DPM

## 2018-03-26 NOTE — PROGRESS NOTES
PATIENT HISTORY:  Inger Wegener is a 57 year old female who presents to clinic for a new right foot injury.  Pt fell 3/22/18 at work.  4-10/10 pain.  Seen at urgent care where metatarsal fractures were noted.  She is NWB in a boot.  Reports limping.  No fevers, chills.  Nonsmoker.  Works as a teacher.  Diabetic.  Family hx of DM.     EXAM:Vitals: /78  Pulse 85  Temp 97.5  F (36.4  C) (Oral)  Wt 174 lb (78.9 kg)  SpO2 95%  BMI 31.83 kg/m2  BMI= Body mass index is 31.83 kg/(m^2).    General appearance: Patient is alert and fully cooperative with history & exam.  No sign of distress is noted during the visit.     Dermatologic: Skin is intact to the right foot.  Mild dorsal bruising.  No paronychia or evidence of soft tissue infection is noted.     Vascular: DP & PT pulses are intact & regular on the right.  Mild right forefoot edema.  CFT and skin temperature are normal.     Neurologic: Lower extremity sensation is intact to light touch.  No evidence of weakness or contracture in the lower extremities.       Musculoskeletal: Pain to midshaft right 2nd and 3rd metatarsals.  No significant midfoot pain to suggest midfoot injury.  Patient is ambulatory with boot and crutches.  No gross ankle deformity noted.  No foot or ankle joint effusion is noted.    XRs of right foot reviewed with pt:    FOOT THREE VIEWS RIGHT  3/22/2018 6:03 PM      HISTORY: History of fracture in right ankle/avulsion fracture.  Foot  injury, right, initial encounter.     COMPARISON: None.         impression: Fractures of the mid second and third metatarsals noted.  These are essentially nondisplaced. No dislocation.      PAT HOWARD MD     ASSESSMENT: Right 2nd and 3rd metatarsal fractures     PLAN:  Reviewed patient's chart in epic.  Discussed condition and treatment options including pros and cons.    Treatment options for the fractures were discussed.  Non-operative treatment would involve a period of immobilization, rest, bracing or  casting and edema control.  There is potential for the fracture to heal without surgery yet there may still be a need for delayed surgery.  There is potential for non-union with any fracture    A decision was made to pursue nonoperative care.    NWB in boot advised.  RICE.  Use crutches or rollabout.    F/u in 2 wks.    We discussed immobilization and the risk of blood clot. Leg raises, knee bends, ankle ROM out of boot several times a day is ok.  Patient to seek medical attention if calf swelling and/or pain, chest pain, shortness of breath.      Yeyo Davis DPM, FACFAS    Weight management plan: Patient was referred to their PCP to discuss a diet and exercise plan.

## 2018-03-26 NOTE — TELEPHONE ENCOUNTER
"**Patient is requesting a 90 day supply.**     Requested Prescriptions   Pending Prescriptions Disp Refills     naproxen (NAPROSYN) 500 MG tablet  Last Written Prescription Date:  3-22-18  Last Fill Quantity: 60 tab,  # refills: 0   Last office visit: 3/22/2018 with prescribing provider:  Diane Rico    Future Office Visit:   Next 5 appointments (look out 90 days)     Apr 09, 2018  7:15 AM CDT   Return Visit with Yeyo Davis DPM   [address] [address]   [phone]            Jun 06, 2018  4:30 PM CDT   SHORT with Yash Ornelas RPH   [facility] ([facility]) [address] [place] [address]   [phone]               60 tablet 0     Sig: Take 1 tablet (500 mg) by mouth 2 times daily (with meals)    NSAID Medications Failed    3/26/2018 12:16 PM       Failed - Normal CBC on file in past 12 months    Recent Labs   Lab Test  11/13/13   1203   HGB  14.4          Passed - Blood pressure under 140/90 in past 12 months    BP Readings from Last 3 Encounters:   03/26/18 127/78   03/22/18 145/86   12/26/17 126/84          Passed - Normal ALT on file in past 12 months    Recent Labs   Lab Test  12/26/17   1329   ALT  30          Passed - Normal AST on file in past 12 months    Recent Labs   Lab Test  12/26/17   1329   AST  13          Passed - Recent (12 mo) or future (30 days) visit within the authorizing provider's specialty    Patient had office visit in the last 12 months or has a visit in the next 30 days with authorizing provider or within the authorizing provider's specialty.  See \"Patient Info\" tab in inbasket, or \"Choose Columns\" in Meds & Orders section of the refill encounter.           Passed - Patient is age 6-64 years       Passed - No active pregnancy on record       Passed - Normal serum creatinine on file in past 12 months    Recent " Labs   Lab Test  12/26/17   1329   CR  0.61          Passed - No positive pregnancy test in past 12 months

## 2018-03-26 NOTE — PATIENT INSTRUCTIONS
No weight bearing on injured foot  Wear CAM boot  Follow up in 2 weeks      [facility]  [place]  [address]   [place] [address]  Ph: [phone]  Fax: [phone] [place] [facility]  [address]  [place] [address]  Ph: [phone]  Fax: [phone]   [place]  [address]   [place] [address]  Ph: [phone]  Fax: [phone] [place]  [address]   [place] [address]  Ph: [phone]  Fax: [phone]     *OTHER STORE OPTIONS OUTSIDE OF [facility]*  [facility]   [address]  [place], [place] [id number] [phone] [facility]  [address] [place], [place] [id number] [phone] [facility]  [address] [place] [address]  [phone]         TOE & METATARSAL FRACTURES  The structure of the foot is complex, consisting of bones, muscles, tendons, and other soft tissues. Of the 26 bones in the foot, 19 are toe bones (phalanges) and metatarsal bones (the long bones in the midfoot). Fractures of the toe and metatarsal bones are common and require evaluation by a specialist. A foot and ankle surgeon should be seen for proper diagnosis and treatment, even if initial treatment has been received in an emergency room.  A fracture is a break in the bone. Fractures can be divided into two categories: traumatic fractures and stress fractures.  TRAMATIC FRACTURES (also called acute fractures) are caused by a direct blow or impact, such as seriously stubbing your toe. Traumatic fractures can be displaced or non-displaced. If the fracture is displaced, the bone is broken in such a way that it has changed in position (dislocated).  Signs and symptoms of a traumatic fracture include:  You may hear a sound at the time of the break.    Pinpoint pain  (pain at the place of impact) at the time the fracture occurs and perhaps for a few hours later, but often the pain goes away after several hours.   Crooked or abnormal  appearance of the toe.   Bruising and swelling the next day.   It is not true that  if you can walk on it, it s not broken.  Evaluation by a foot and ankle surgeon is always recommended.   STRESS FRACTURES are tiny, hairline breaks that are usually caused by repetitive stress. Stress fractures often afflict athletes who, for example, too rapidly increase their running mileage. They can also be caused by an abnormal foot structure, deformities, or osteoporosis. Improper footwear may also lead to stress fractures. Stress fractures should not be ignored. They require proper medical attention to heal correctly.  Symptoms of stress fractures include:  Pain with or after normal activity   Pain that goes away when resting and then returns when standing or during activity    Pinpoint pain  (pain at the site of the fracture) when touched   Swelling, but no bruising   IMPROPER TREATMENT  Some people say that  the doctor can t do anything for a broken bone in the foot.  This is usually not true. In fact, if a fractured toe or metatarsal bone is not treated correctly, serious complications may develop. For example:  A deformity in the bony architecture which may limit the ability to move the foot or cause difficulty in fitting shoes   Arthritis, which may be caused by a fracture in a joint (the juncture where two bones meet), or may be a result of angular deformities that develop when a displaced fracture is severe or hasn t been properly corrected   Chronic pain and deformity   Non-union, or failure to heal, can lead to subsequent surgery or chronic pain.   PROPER TREATMENT FOR TOES  Fractures of the toe bones are almost always traumatic fractures. Treatment for traumatic fractures depends on the break itself and may include these options:  Rest. Sometimes rest is all that is needed to treat a traumatic fracture of the toe.   Splinting. The toe may be fitted with a splint to keep it in a fixed position.   Rigid or stiff-soled  shoe. Wearing a stiff-soled shoe protects the toe and helps keep it properly positioned.    Ben taping  the fractured toe to another toe is sometimes appropriate, but in other cases it may be harmful.   Surgery. If the break is badly displaced or if the joint is affected, surgery may be necessary. Surgery often involves the use of fixation devices, such as pins.   PROPER TREATMENT OF METATARSALS  Breaks in the metatarsal bones may be either stress or traumatic fractures. Certain kinds of fractures of the metatarsal bones present unique challenges.  For example, sometimes a fracture of the first metatarsal bone (behind the big toe) can lead to arthritis. Since the big toe is used so frequently and bears more weight than other toes, arthritis in that area can make it painful to walk, bend, or even stand.  Another type of break, called a Rodney fracture, occurs at the base of the fifth metatarsal bone (behind the little toe). It is often misdiagnosed as an ankle sprain, and misdiagnosis can have serious consequences since sprains and fractures require different treatments. Your foot and ankle surgeon is an expert in correctly identifying these conditions as well as other problems of the foot.  Treatment of metatarsal fractures depends on the type and extent of the fracture, and may include:  Rest. Sometimes rest is the only treatment needed to promote healing of a stress or traumatic fracture of a metatarsal bone.   Avoid the offending activity. Because stress fractures result from repetitive stress, it is important to avoid the activity that led to the fracture. Crutches or a wheelchair are sometimes required to offload weight from the foot to give it time to heal.   Immobilization, casting, or rigid shoe. A stiff-soled shoe or other form of immobilization may be used to protect the fractured bone while it is healing.   Surgery. Some traumatic fractures of the metatarsal bones require surgery, especially if the break  is badly displaced.   Follow-up care. Your foot and ankle surgeon will provide instructions for care following surgical or non-surgical treatment. Physical therapy, exercises and rehabilitation may be included in a schedule for return to normal activities.       PRICE Therapy    Many aches and pains throughout the foot and ankle can be helped with many simple treatments.  This is usually described as PRICE Therapy.      P - Protection - often times, inflammation/pain in the lower extremity is not able to improve simply because the areas involved are never allowed to rest.  Every step we take can bother the problematic area.  Protecting those areas is an important step in the healing process.  This may involve a walking cast boot, a special insert/orthotic device, an ankle brace, or simply avoiding barefoot walking.    R - Rest - in addition to protecting the foot/ankle, resting is an important, but often times difficult, treatment option.  Getting off your feet when they bother you, and specifically avoiding activities that cause pain/discomfort, are very beneficial to prevent, and treat, foot/ankle pain.      I - Ice - icing regularly can help to decrease inflammation and swelling in the foot, thus decreasing pain.  Using an ice pack or a bag of frozen peas works very well.  Ice for 20 minutes multiple times per day as needed.  Do not place the ice directly on the skin as this can cause tissue damage.    C - Compression - using a compression wrap or an ACE wrap can help to decrease swelling, which can help to decrease pain.  Wearing the wraps is generally not needed at night, but they should be worn on a regular basis when you are going to be on your feet for prolonged periods as gravity tends to pull fluids down to your feet/ankles.    E - Elevation - elevating your lower extremities multiple times daily for 15-20 minutes can help to decrease swelling, which works well in decreasing pain levels.      BLOOD CLOT  PREVENTION - WEARING A CAST BOOT    Do not drive with the CAM boot  Do not wear during long-distance travel: long car rides / plane trips  Wear the boot when moving, regardless of your weight-bearing status    Any brace that extends up to the knee can increase your chance of developing a blood clot. Blood clots generally occur in the large veins of your calf, thigh, or abdomen. A blood clot may form when blood is stagnant in the veins while your leg is immobilized in the brace. Moy and relaxing the calf muscles by moving the ankle is the best method to avoid stagnant blood. Clarify with your doctor if you should be doing this as this may not be recommended for certain tendon surgeries.    Blood clots or deep venous thrombosis (DVT) can be life threatening if a portion of the clot breaks away and travels through the veins to your lungs. This is called a pulmonary embolism (PE) which can result in death.    Symptoms of a DVT may include swelling, tenderness, a warm feeling or redness in the leg. DVT can occur in both legs, even if only one side is in a brace. If you have these symptoms, you should call your doctor immediately or go to the Emergency Room to have your leg scanned.     Symptoms of a pulmonary embolism (PE) include chest pain, shortness of breath or the need to breath rapidly that is not associated with exercise, difficulty breathing, rapid heart rate, coughing or coughing up blood, or a feeling of passing out. Chest pain can range from mild to knife-like pain while taking a deep breath. Pulmonary embolism can occur without any symptoms whatsoever. You need to be evaluated in a hospital emergency room immediately if you have symptoms of a PE. Please call 911 as PE is a life-threatening emergency.    Be certain that you understand how much ankle range of motion is allowed. Your foot and ankle problem is being immobilized by the brace, yet we do not want you to develop a blood clot. The velcro strap  braces are intended to be removed for periodic exercise of the ankle. Your doctor will tell you if it is okay to do this depending on the type of surgery or injury you had.    Your own personal risk of developing a DVT or PE is dependent on many factors. A personal or family history of previous blood clot or a clotting disorder (such as thrombophilia) puts you at risk. Other risk factors include history of cancer, current use of estrogen medications (such as birth control pills or post menopausal medications), recent trauma or fracture, diabetes, recent heart attack, COPD, heart failure, pregnancy, obesity, advanced age, smoking, etc. Please make sure your doctor is aware if you have any of these risk factors.      Body Mass Index (BMI)  Many things can cause foot and ankle problems. Foot structure, activity level, foot mechanics and injuries are common causes of pain.  One very important issue that often goes unmentioned is body weight. Extra weight can cause increased stress on muscles, ligaments, bones and tendons. Sometimes just a few extra pounds is all it takes to put one over her/his threshold. Without reducing that stress, it can be difficult to alleviate pain.   Some people are uncomfortable addressing this issue, but we feel it is important for you to think about it. As Foot & Ankle specialists, our job is addressing the lower extremity problem and possible causes.   Regarding extra body weight, we encourage patients to discuss diet and weight management plans with their primary care doctors. It is this team approach that gives you the best opportunity for pain relief and getting you back on your feet.

## 2018-03-27 NOTE — TELEPHONE ENCOUNTER
"Requested Prescriptions   Pending Prescriptions Disp Refills     venlafaxine (EFFEXOR-XR) 37.5 MG 24 hr capsule [Pharmacy Med Name: VENLAFAXINE ER 37.5MG CAPSULES]  Last Written Prescription Date:  3/26/2018  Last Fill Quantity: 90 capsule,  # refills: 2   Last Office Visit: 3/8/2017   Future Office Visit:    Next 5 appointments (look out 90 days)     Apr 09, 2018  7:15 AM CDT   Return Visit with Yeyo Davis DPM   [address] [address]   [phone]            Jun 06, 2018  4:30 PM CDT   SHORT with Yash Ornelas RPH   [facility] ([facility]) [address] [place] [address]   [phone]               270 capsule 2     Sig: TAKE 1 CAPSULE BY MOUTH THREE TIMES DAILY    Serotonin-Norepinephrine Reuptake Inhibitors  Passed    3/26/2018  6:36 PM       Passed - Blood pressure under 140/90 in past 12 months    BP Readings from Last 3 Encounters:   03/26/18 127/78   03/22/18 145/86   12/26/17 126/84                Passed - Recent (12 mo) or future (30 days) visit within the authorizing provider's specialty    Patient had office visit in the last 12 months or has a visit in the next 30 days with authorizing provider or within the authorizing provider's specialty.  See \"Patient Info\" tab in inbasket, or \"Choose Columns\" in Meds & Orders section of the refill encounter.           Passed - Patient is age 18 or older       Passed - No active pregnancy on record       Passed - Normal serum creatinine on file in past 12 months    Recent Labs   Lab Test  12/26/17   1329   CR  0.61            Passed - No positive pregnancy test in past 12 months          "

## 2018-03-29 RX ORDER — NAPROXEN 500 MG/1
500 TABLET ORAL 2 TIMES DAILY WITH MEALS
Qty: 60 TABLET | Refills: 2 | Status: SHIPPED | OUTPATIENT
Start: 2018-03-29 | End: 2018-06-06

## 2018-03-29 RX ORDER — VENLAFAXINE HYDROCHLORIDE 37.5 MG/1
CAPSULE, EXTENDED RELEASE ORAL
Qty: 270 CAPSULE | Refills: 2 | OUTPATIENT
Start: 2018-03-29

## 2018-03-29 NOTE — TELEPHONE ENCOUNTER
Routing refill request to provider for review/approval because:  Labs not current:  Hemoglobin.  -Writer unable to authorize 90 day supply as patient overdue for monitoring lab.  This medication was prescribed for foot injury on 3/22/18, writer unsure if 90 day supply is appropriate.  Defer to PCP.    Diane-Please review and advise/sign if agree.  Writer did also pend hemoglobin lab.    Thank you!  RITO MarcanoN, RN

## 2018-04-09 ENCOUNTER — RADIANT APPOINTMENT (OUTPATIENT)
Dept: GENERAL RADIOLOGY | Facility: CLINIC | Age: 58
End: 2018-04-09
Attending: PODIATRIST
Payer: COMMERCIAL

## 2018-04-09 ENCOUNTER — OFFICE VISIT (OUTPATIENT)
Dept: PODIATRY | Facility: CLINIC | Age: 58
End: 2018-04-09
Payer: OTHER MISCELLANEOUS

## 2018-04-09 VITALS
HEIGHT: 62 IN | DIASTOLIC BLOOD PRESSURE: 72 MMHG | HEART RATE: 80 BPM | WEIGHT: 174 LBS | SYSTOLIC BLOOD PRESSURE: 130 MMHG | BODY MASS INDEX: 32.02 KG/M2

## 2018-04-09 DIAGNOSIS — S92.901A CLOSED FRACTURE OF RIGHT FOOT, INITIAL ENCOUNTER: Primary | ICD-10-CM

## 2018-04-09 PROCEDURE — 73630 X-RAY EXAM OF FOOT: CPT | Mod: RT

## 2018-04-09 PROCEDURE — 99214 OFFICE O/P EST MOD 30 MIN: CPT | Performed by: PODIATRIST

## 2018-04-09 ASSESSMENT — PAIN SCALES - GENERAL: PAINLEVEL: SEVERE PAIN (7)

## 2018-04-09 NOTE — MR AVS SNAPSHOT
After Visit Summary   4/9/2018    Inger Wegener    MRN: [id number]           Patient Information     Date Of Birth          1960        Visit Information        Provider Department      4/9/2018 7:15 AM Yeyo Davis DPM [facility]        Today's Diagnoses     Closed fracture of right foot, initial encounter    -  1      Care Instructions      Dr [facility] Locations        Mondays Tuesdays   [place] [facility] [place]   [address] [facility] [address]   [place] [address] [place] [address]   ph: [phone] ph: [phone]   fax: [phone] fax: [phone]       Wednesdays Thursdays - Surgery   [facility] Scheduling - [place]: [phone]   [address] [place] [address] To Schedule an appointment please call:   ph: [phone]   fax: [phone]      Follow up in 4 weeks    SENIOR THERAPY  Many aches and pains throughout the foot and ankle can be helped with many simple treatments. This is usually described as PRICE Therapy.      P - Protection - often times, inflammation/pain in the lower extremity is not able to improve simply because the areas involved are never allowed to rest. Every step we take can bother the problematic area. Protecting those areas is an important step in the healing process. This may involve a walking cast boot, a special insert/orthotic device, an ankle brace, or simply avoiding barefoot walking.    R - Rest - in addition to protecting the foot/ankle, resting is an important, but often times difficult, treatment option. Getting off your feet when they bother you, and specifically avoiding activities that cause pain/discomfort, are very beneficial to prevent, and treat, foot/ankle pain.      I - Ice - icing regularly can help to decrease inflammation and swelling in the foot, thus decreasing pain. Using an ice pack or a bag of frozen  veggies works very well. Ice for 20 minutes multiple times per day as needed.  Do not place the ice directly on the skin as this can cause tissue damage.    C - Compression - using a compression wrap or an ACE wrap can help to decrease swelling, which can help to decrease pain. Wearing the wraps is generally not needed at night, but they should be worn on a regular basis when you are going to be on your feet for prolonged periods as gravity tends to pull fluids down to your feet/ankles.    E - Elevation - elevating your lower extremities multiple times daily for 15-20 minutes can help to decrease swelling, which works well in decreasing pain levels.          Continue non weight bearing in the boot  Ice, elevate, ACE as needed  Follow up in 4 weeks        Body Mass Index (BMI)  Many things can cause foot and ankle problems. Foot structure, activity level, foot mechanics and injuries are common causes of pain.  One very important issue that often goes unmentioned is body weight. Extra weight can cause increased stress on muscles, ligaments, bones and tendons. Sometimes just a few extra pounds is all it takes to put one over her/his threshold. Without reducing that stress, it can be difficult to alleviate pain.   Some people are uncomfortable addressing this issue, but we feel it is important for you to think about it. As Foot & Ankle specialists, our job is addressing the lower extremity problem and possible causes.   Regarding extra body weight, we encourage patients to discuss diet and weight management plans with their primary care doctors. It is this team approach that gives you the best opportunity for pain relief and getting you back on your feet.             Follow-ups after your visit        Your next 10 appointments already scheduled     Apr 18, 2018  4:20 PM CDT   PHYSICAL with DANGELO Verduzco CNP   [facility]    [address] [facility]  [id number] [phone]            Jun 06, 2018  4:30 PM CDT   SHORT with Yash Ornelas RPH   [facility] ([facility])    [address]  [place] [id number] [phone]              Who to contact     If you have questions or need follow up information about today's clinic visit or your schedule please contact [facility] directly at [phone].  Normal or non-critical lab and imaging results will be communicated to you by [email or web address], letter or phone within 4 business days after the clinic has received the results. If you do not hear from us within 7 days, please contact the clinic through [employer] or phone. If you have a critical or abnormal lab result, we will notify you by phone as soon as possible.  Submit refill requests through [employer] or call your pharmacy and they will forward the refill request to us. Please allow 3 business days for your refill to be completed.          Additional Information About Your Visit        [email or web address] Information     [employer] gives you secure access to your electronic health record. If you see a primary care provider, you can also send messages to your care team and make appointments. If you have questions, please call your primary care clinic.  If you do not have a primary care provider, please call [phone] and they will assist you.        Care EveryWhere ID     This is your Care EveryWhere ID. This could be used by other organizations to access your [facility] medical records  [id number]        Your Vitals Were     Pulse Height BMI (Body Mass Index)             80 5' 2\" (1.575 m) 31.83 kg/m2          Blood Pressure from Last 3 Encounters:   04/09/18 130/72   03/26/18 127/78   03/22/18 145/86    Weight from Last 3 Encounters:   04/09/18 174 lb (78.9 kg)   03/26/18 174 lb (78.9 kg)   03/22/18 175 lb (79.4 kg)              We Performed the Following     XR Foot Right G/E 3 Views        Primary Care " Provider Office Phone # Fax #    DANGELO Verduzco [phone] [address] [place] [address]        Equal Access to Services     MARIBEL HOOD : Hadii aad ku hadkario Soomaali, waaxda luqadaha, qaybta kaalmada adeegyada, waxvivek livanin hayaan william boydmichael lemos. So [facility] [phone].    ATENCIÓN: Si habla español, tiene a bullard disposición servicios gratuitos de asistencia lingüística. Llame al [phone].    We comply with applicable federal civil rights laws and [place] laws. We do not discriminate on the basis of race, color, national origin, age, disability, sex, sexual orientation, or gender identity.            Thank you!     Thank you for choosing [facility]  for your care. Our goal is always to provide you with excellent care. Hearing back from our patients is one way we can continue to improve our services. Please take a few minutes to complete the written survey that you may receive in the mail after your visit with us. Thank you!             Your Updated Medication List - Protect others around you: Learn how to safely use, store and throw away your medicines at www.disposemymeds.org.          This list is accurate as of 4/9/18  8:01 AM.  Always use your most recent med list.                   Brand Name Dispense Instructions for use Diagnosis    ADVIL PO      Take 400 mg by mouth as needed        ASPIRIN NOT PRESCRIBED    INTENTIONAL     continuous prn for other Reported on 3/8/2017        blood glucose lancing device     1 each    Device to be used with lancets.    Type 2 diabetes mellitus with hyperglycemia, without long-term current use of insulin (H)       blood glucose monitoring lancets     3 Box    Use to test blood sugar 2 times daily or as directed.  Ok to substitute alternative if insurance prefers.    Type 2 diabetes mellitus with hyperglycemia, without long-term current use of insulin (H)       blood glucose monitoring test  strip    ACCU-CHEK SMARTVIEW    180 each    Use to test blood sugar 2 times daily or as directed.  Ok to substitute alternative if insurance prefers. Profile Rx: patient will contact pharmacy when needed    Type 2 diabetes mellitus with hyperglycemia, without long-term current use of insulin (H)       fluticasone 50 MCG/ACT spray    FLONASE    48 mL    SHAKE LIQUID AND USE 1 TO 2 SPRAYS IN EACH NOSTRIL DAILY    Acute sinusitis with symptoms > 10 days       hydrochlorothiazide 25 MG tablet    HYDRODIURIL    90 tablet    TAKE 1 TABLET BY MOUTH EVERY DAY    Hypertension goal BP (blood pressure) < 140/90       metFORMIN 500 MG 24 hr tablet    GLUCOPHAGE-XR    360 tablet    TAKE 2 TABLETS BY MOUTH TWICE DAILY WITH MEALS    Type 2 diabetes mellitus with hyperglycemia, without long-term current use of insulin (H)       naproxen 500 MG tablet    NAPROSYN    60 tablet    Take 1 tablet (500 mg) by mouth 2 times daily (with meals)    Foot injury, right, initial encounter       order for DME     2 Units    Equipment being ordered: Compression stocking, medium compression    Type 2 diabetes mellitus with hyperglycemia, without long-term current use of insulin (H)       order for DME     1 Device    Knee Walker/rollabout Length of use: Three months    Closed fracture of right foot, initial encounter       simvastatin 20 MG tablet    ZOCOR    90 tablet    TAKE 1 TABLET BY MOUTH EVERY NIGHT AT BEDTIME    Hyperlipidemia with target LDL less than 130       venlafaxine 37.5 MG 24 hr capsule    EFFEXOR-XR    90 capsule    Take 1 capsule (37.5 mg) by mouth 3 times daily    Adjustment disorder with mixed anxiety and depressed mood

## 2018-04-09 NOTE — PROGRESS NOTES
"PATIENT HISTORY:  Inger Wegener is a 57 year old female who presents to clinic for recheck of R foot metatarsal fractures.  2 wk duration.  7-9/10 pain.  She is in a boot.  Reports wt bearing intermittently.  Her pain and swelling have not improved much.  She has not been icing, elevating much.  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  Denies fevers, new injury.  Nonsmoker.  Diabetic.  Family hx of DM.     EXAM:Vitals: /72  Pulse 80  Ht 5' 2\" (1.575 m)  Wt 174 lb (78.9 kg)  BMI 31.83 kg/m2  BMI= Body mass index is 31.83 kg/(m^2).    General appearance: Patient is alert and fully cooperative with history & exam.  No sign of distress is noted during the visit.     Dermatologic: Skin is intact to the right foot.  Mild dorsal bruising.  No paronychia or evidence of soft tissue infection is noted.      Vascular: DP & PT pulses are intact & regular on the right.  Mild right forefoot edema.  CFT and skin temperature are normal.      Neurologic: Lower extremity sensation is intact to light touch.  No evidence of weakness or contracture in the lower extremities.        Musculoskeletal: Pain to midshaft right 2nd and 3rd metatarsals.  No significant midfoot pain to suggest midfoot injury.  Patient is ambulatory with boot and crutches.  No gross ankle deformity noted.  No foot or ankle joint effusion is noted.     XRs of right foot reviewed with pt:  2nd and 3rd metatarsal fractures, perhaps some shifting of the 3rd metatarsal fracture, but overall alignment is acceptable.      ASSESSMENT: Right 2nd and 3rd metatarsal fractures      PLAN:  Reviewed patient's chart in epic.  Discussed condition and treatment options including pros and cons.    Again advised NWB in boot given pain, swelling, some shifting of 3rd met fx.  RICE more consistently.  Use crutches or rollabout.      Discussed possible surgery with further displacement, or if the fracture(s) do not heal.    Pt " has some discomfort with the boot, so post op shoe given as alternative.  Encouraged boot use as I feel this is more protective.    Will get pt off work for the next 4 weeks for recovery, as she feels she cannot care for this properly with her current work duties.     F/u in 4 wks.    Yeyo Davis DPM, FACFAS    Weight management plan: Patient was referred to their PCP to discuss a diet and exercise plan.

## 2018-04-09 NOTE — LETTER
[address] [address]  Phone: [phone]    April 9, 2018        Inger Wegener  [address] [facility] [address] [address]          To whom it may concern:    RE: Inger Wegener    Patient was seen and treated today at our clinic.    Due to injury please excuse her from work starting today through 5/7/18.    Please contact me for questions or concerns.      Sincerely,        Yeyo Davis DPM

## 2018-04-09 NOTE — NURSING NOTE
"Chief Complaint   Patient presents with     RECHECK       Initial /72  Pulse 80  Ht 5' 2\" (1.575 m)  Wt 174 lb (78.9 kg)  BMI 31.83 kg/m2 Estimated body mass index is 31.83 kg/(m^2) as calculated from the following:    Height as of this encounter: 5' 2\" (1.575 m).    Weight as of this encounter: 174 lb (78.9 kg).  Medication Reconciliation: complete\    "

## 2018-04-09 NOTE — PATIENT INSTRUCTIONS
Dr [facility] Locations        Mondays Tuesdays   [facility] [place]   [address] [facility] [address]   [place] [address] [place] [address]   ph: [phone] ph: [phone]   fax: [phone] fax: [phone]       Wednesdays Thursdays - Surgery   [facility] Scheduling - [place]: [phone]   [address] [place] [address] To Schedule an appointment please call:   ph: [phone]   fax: [phone]      Follow up in 4 weeks    PRICE THERAPY  Many aches and pains throughout the foot and ankle can be helped with many simple treatments. This is usually described as PRICE Therapy.      P - Protection - often times, inflammation/pain in the lower extremity is not able to improve simply because the areas involved are never allowed to rest. Every step we take can bother the problematic area. Protecting those areas is an important step in the healing process. This may involve a walking cast boot, a special insert/orthotic device, an ankle brace, or simply avoiding barefoot walking.    R - Rest - in addition to protecting the foot/ankle, resting is an important, but often times difficult, treatment option. Getting off your feet when they bother you, and specifically avoiding activities that cause pain/discomfort, are very beneficial to prevent, and treat, foot/ankle pain.      I - Ice - icing regularly can help to decrease inflammation and swelling in the foot, thus decreasing pain. Using an ice pack or a bag of frozen veggies works very well. Ice for 20 minutes multiple times per day as needed.  Do not place the ice directly on the skin as this can cause tissue damage.    C - Compression - using a compression wrap or an ACE wrap can help to decrease swelling, which can help to decrease pain. Wearing the wraps is generally not needed at night, but they should be worn on a regular basis when you are  going to be on your feet for prolonged periods as gravity tends to pull fluids down to your feet/ankles.    E - Elevation - elevating your lower extremities multiple times daily for 15-20 minutes can help to decrease swelling, which works well in decreasing pain levels.          Continue non weight bearing in the boot  Ice, elevate, ACE as needed  Follow up in 4 weeks        Body Mass Index (BMI)  Many things can cause foot and ankle problems. Foot structure, activity level, foot mechanics and injuries are common causes of pain.  One very important issue that often goes unmentioned is body weight. Extra weight can cause increased stress on muscles, ligaments, bones and tendons. Sometimes just a few extra pounds is all it takes to put one over her/his threshold. Without reducing that stress, it can be difficult to alleviate pain.   Some people are uncomfortable addressing this issue, but we feel it is important for you to think about it. As Foot & Ankle specialists, our job is addressing the lower extremity problem and possible causes.   Regarding extra body weight, we encourage patients to discuss diet and weight management plans with their primary care doctors. It is this team approach that gives you the best opportunity for pain relief and getting you back on your feet.

## 2018-04-09 NOTE — LETTER
"    4/9/2018         RE: Inger Wegener  [address] [facility] [address]        Dear Colleague,    Thank you for referring your patient, Inger Wegener, to the [facility]. Please see a copy of my visit note below.    PATIENT HISTORY:  Inger Wegener is a 57 year old female who presents to clinic for recheck of R foot metatarsal fractures.  2 wk duration.  7-9/10 pain.  She is in a boot.  Reports wt bearing intermittently.  Her pain and swelling have not improved much.  She has not been icing, elevating much.  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  Denies fevers, new injury.  Nonsmoker.  Diabetic.  Family hx of DM.     EXAM:Vitals: /72  Pulse 80  Ht 5' 2\" (1.575 m)  Wt 174 lb (78.9 kg)  BMI 31.83 kg/m2  BMI= Body mass index is 31.83 kg/(m^2).    General appearance: Patient is alert and fully cooperative with history & exam.  No sign of distress is noted during the visit.     Dermatologic: Skin is intact to the right foot.  Mild dorsal bruising.  No paronychia or evidence of soft tissue infection is noted.      Vascular: DP & PT pulses are intact & regular on the right.  Mild right forefoot edema.  CFT and skin temperature are normal.      Neurologic: Lower extremity sensation is intact to light touch.  No evidence of weakness or contracture in the lower extremities.        Musculoskeletal: Pain to midshaft right 2nd and 3rd metatarsals.  No significant midfoot pain to suggest midfoot injury.  Patient is ambulatory with boot and crutches.  No gross ankle deformity noted.  No foot or ankle joint effusion is noted.     XRs of right foot reviewed with pt:  2nd and 3rd metatarsal fractures, perhaps some shifting of the 3rd metatarsal fracture, but overall alignment is acceptable.      ASSESSMENT: Right 2nd and 3rd metatarsal fractures      PLAN:  Reviewed patient's chart in epic.  Discussed condition and treatment options including pros and " cons.    Again advised NWB in boot given pain, swelling, some shifting of 3rd met fx.  RICE  more consistently.  Use crutches or rollabout.      Discussed possible surgery with further displacement, or if the fracture(s) do not heal.    Pt has some discomfort with the boot, so post op shoe given as alternative.  Encouraged boot use as I feel this is more protective.    Will get pt off work for the next 4 weeks for recovery, as she feels she cannot care for this properly with her current work duties.     F/u in 4 wks.    Yeyo Davis DPM, FACFAS    Weight management plan: Patient was referred to their PCP to discuss a diet and exercise plan.        Again, thank you for allowing me to participate in the care of your patient.        Sincerely,        Yeyo Davis DPM

## 2018-04-18 ENCOUNTER — OFFICE VISIT (OUTPATIENT)
Dept: FAMILY MEDICINE | Facility: CLINIC | Age: 58
End: 2018-04-18
Payer: COMMERCIAL

## 2018-04-18 VITALS
WEIGHT: 174 LBS | RESPIRATION RATE: 20 BRPM | HEIGHT: 62 IN | BODY MASS INDEX: 32.02 KG/M2 | SYSTOLIC BLOOD PRESSURE: 135 MMHG | DIASTOLIC BLOOD PRESSURE: 83 MMHG | HEART RATE: 90 BPM | OXYGEN SATURATION: 95 %

## 2018-04-18 DIAGNOSIS — Z13.89 SCREENING FOR DIABETIC PERIPHERAL NEUROPATHY: ICD-10-CM

## 2018-04-18 DIAGNOSIS — Z00.00 ROUTINE GENERAL MEDICAL EXAMINATION AT A HEALTH CARE FACILITY: Primary | ICD-10-CM

## 2018-04-18 DIAGNOSIS — Z23 NEED FOR PROPHYLACTIC VACCINATION AGAINST STREPTOCOCCUS PNEUMONIAE (PNEUMOCOCCUS): ICD-10-CM

## 2018-04-18 DIAGNOSIS — Z12.31 VISIT FOR SCREENING MAMMOGRAM: ICD-10-CM

## 2018-04-18 DIAGNOSIS — R09.81 NASAL CONGESTION: ICD-10-CM

## 2018-04-18 DIAGNOSIS — E78.5 HYPERLIPIDEMIA WITH TARGET LDL LESS THAN 130: ICD-10-CM

## 2018-04-18 DIAGNOSIS — I10 HYPERTENSION GOAL BP (BLOOD PRESSURE) < 140/90: ICD-10-CM

## 2018-04-18 DIAGNOSIS — E11.65 TYPE 2 DIABETES MELLITUS WITH HYPERGLYCEMIA, WITHOUT LONG-TERM CURRENT USE OF INSULIN (H): ICD-10-CM

## 2018-04-18 PROCEDURE — 90471 IMMUNIZATION ADMIN: CPT | Performed by: NURSE PRACTITIONER

## 2018-04-18 PROCEDURE — 90670 PCV13 VACCINE IM: CPT | Performed by: NURSE PRACTITIONER

## 2018-04-18 PROCEDURE — 99396 PREV VISIT EST AGE 40-64: CPT | Mod: 25 | Performed by: NURSE PRACTITIONER

## 2018-04-18 PROCEDURE — 99207 C FOOT EXAM  NO CHARGE: CPT | Mod: 25 | Performed by: NURSE PRACTITIONER

## 2018-04-18 RX ORDER — FLUTICASONE PROPIONATE 50 MCG
2 SPRAY, SUSPENSION (ML) NASAL DAILY
Qty: 48 ML | Refills: 3 | Status: SHIPPED | OUTPATIENT
Start: 2018-04-18 | End: 2019-04-23

## 2018-04-18 RX ORDER — SIMVASTATIN 20 MG
20 TABLET ORAL AT BEDTIME
Qty: 90 TABLET | Refills: 3 | Status: SHIPPED | OUTPATIENT
Start: 2018-04-18 | End: 2019-04-23

## 2018-04-18 RX ORDER — LANCETS
EACH MISCELLANEOUS
Qty: 300 EACH | Refills: 3 | Status: SHIPPED | OUTPATIENT
Start: 2018-04-18

## 2018-04-18 RX ORDER — METFORMIN HCL 500 MG
1000 TABLET, EXTENDED RELEASE 24 HR ORAL 2 TIMES DAILY WITH MEALS
Qty: 360 TABLET | Refills: 3 | Status: SHIPPED | OUTPATIENT
Start: 2018-04-18 | End: 2019-04-23

## 2018-04-18 RX ORDER — HYDROCHLOROTHIAZIDE 25 MG/1
25 TABLET ORAL DAILY
Qty: 90 TABLET | Refills: 3 | Status: SHIPPED | OUTPATIENT
Start: 2018-04-18 | End: 2019-04-18

## 2018-04-18 NOTE — PROGRESS NOTES
SUBJECTIVE:   CC: Inger Wegener is an 57 year old woman who presents for preventive health visit.     Physical   Annual:     Getting at least 3 servings of Calcium per day::  Yes    Bi-annual eye exam::  Yes    Dental care twice a year::  Yes    Sleep apnea or symptoms of sleep apnea::  None    Diet::  Diabetic    Frequency of exercise::  2-3 days/week    Duration of exercise::  15-30 minutes    Taking medications regularly::  Yes    Medication side effects::  None    Additional concerns today::  No                Today's PHQ-2 Score:   PHQ-2 ( 1999 [employer]) 4/18/2018   Q1: Little interest or pleasure in doing things 0   Q2: Feeling down, depressed or hopeless 0   PHQ-2 Score 0   Q1: Little interest or pleasure in doing things Not at all   Q2: Feeling down, depressed or hopeless Not at all   PHQ-2 Score 0       Abuse: Current or Past(Physical, Sexual or Emotional)- No  Do you feel safe in your environment - Yes    Social History   Substance Use Topics     Smoking status: Passive Smoke Exposure - Never Smoker     Smokeless tobacco: Never Used     Alcohol use Yes     Alcohol Use 4/18/2018   If you drink alcohol do you typically have greater than 3 drinks per day OR greater than 7 drinks per week? No   No flowsheet data found.    Reviewed orders with patient.  Reviewed health maintenance and updated orders accordingly - Yes  Labs reviewed in EPIC  BP Readings from Last 3 Encounters:   04/18/18 135/83   04/09/18 130/72   03/26/18 127/78    Wt Readings from Last 3 Encounters:   04/18/18 174 lb (78.9 kg)   04/09/18 174 lb (78.9 kg)   03/26/18 174 lb (78.9 kg)                  Patient Active Problem List   Diagnosis     CARDIOVASCULAR SCREENING; LDL GOAL LESS THAN 160     Hypertension goal BP (blood pressure) < 140/90     Flying phobia     Hyperlipidemia with target LDL less than 130     Adjustment reaction     Abnormal glucose     Adjustment disorder with mixed anxiety and depressed mood     Closed nondisplaced dome  fracture of right talus, sequela     Ankle pain, right     Type 2 diabetes mellitus with hyperglycemia (H)     No past surgical history on file.    Social History   Substance Use Topics     Smoking status: Passive Smoke Exposure - Never Smoker     Smokeless tobacco: Never Used     Alcohol use Yes     Family History   Problem Relation Age of Onset     Eye Disorder Mother      Hypertension Mother      Thyroid Disease Mother      Hearing Loss Father      DIABETES Maternal Grandmother          Current Outpatient Prescriptions   Medication Sig Dispense Refill     ASPIRIN NOT PRESCRIBED (INTENTIONAL) continuous prn for other Reported on 3/8/2017       blood glucose (ACCU-CHEK FASTCLIX) lancing device Device to be used with lancets. 1 each 0     blood glucose monitoring (ACCU-CHEK FASTCLIX) lancets Use to test blood sugar 2 times daily or as directed.  Ok to substitute alternative if insurance prefers. 300 each 3     blood glucose monitoring (ACCU-CHEK SMARTVIEW) test strip Use to test blood sugar 2 times daily or as directed.  Ok to substitute alternative if insurance prefers.  Profile Rx: patient will contact pharmacy when needed 180 each 3     fluticasone (FLONASE) 50 MCG/ACT spray Spray 2 sprays into both nostrils daily 48 mL 3     hydrochlorothiazide (HYDRODIURIL) 25 MG tablet Take 1 tablet (25 mg) by mouth daily 90 tablet 3     Ibuprofen (ADVIL PO) Take 400 mg by mouth as needed        metFORMIN (GLUCOPHAGE-XR) 500 MG 24 hr tablet Take 2 tablets (1,000 mg) by mouth 2 times daily (with meals) 360 tablet 3     order for DME Knee Walker/rollabout  Length of use: Three months 1 Device 0     order for DME Medium surgical shoe 1 Device 0     simvastatin (ZOCOR) 20 MG tablet Take 1 tablet (20 mg) by mouth At Bedtime 90 tablet 3     venlafaxine (EFFEXOR-XR) 37.5 MG 24 hr capsule Take 1 capsule (37.5 mg) by mouth 3 times daily 90 capsule 2     naproxen (NAPROSYN) 500 MG tablet Take 1 tablet (500 mg) by mouth 2 times daily  (with meals) (Patient not taking: Reported on 4/18/2018) 60 tablet 2     order for DME Equipment being ordered: Compression stocking, medium compression (Patient not taking: Reported on 4/18/2018) 2 Units 11     Allergies   Allergen Reactions     Erythromycin Nausea     Recent Labs   Lab Test  12/26/17   1329  06/20/17   0948  12/29/16   1044   09/29/15   0748   A1C  6.9*  6.7*  6.6*   < >  6.7*   LDL  85  114*  91   --   107   HDL  48*  48*  47*   --   49*   TRIG  302*  158*  150*   --   219*   ALT  30   --   38   --   44   CR  0.61   --   0.73   --   0.80   GFRESTIMATED  >90   --   82   --   75   GFRESTBLACK  >90   --   >90   GFR Calc     --   >90   GFR Calc     POTASSIUM  3.6   --   3.4   --   3.4   TSH  4.94*  2.97   --    < >   --     < > = values in this interval not displayed.        Patient over age 50, mutual decision to screen reflected in health maintenance.    Pertinent mammograms are reviewed under the imaging tab.  History of abnormal Pap smear:   Last 3 Pap and HPV Results:   PAP / HPV Latest Ref Rng & Units 12/29/2016 11/13/2013   PAP - NIL NIL   HPV 16 DNA NEG Negative -   HPV 18 DNA NEG Negative -   OTHER HR HPV NEG Negative -       Reviewed and updated as needed this visit by clinical staff  Allergies  Meds         Reviewed and updated as needed this visit by Provider          Blood sugars: 111-160    Right foot fracture.  On complete no weight bearing for 1 month.         Review of Systems  C: NEGATIVE for fever, chills, change in weight  I: NEGATIVE for worrisome rashes, moles or lesions  E: NEGATIVE for vision changes or irritation  ENT: NEGATIVE for ear, mouth and throat problems  R: NEGATIVE for significant cough or SOB  B: NEGATIVE for masses, tenderness or discharge  CV: NEGATIVE for chest pain, palpitations or peripheral edema  GI: NEGATIVE for nausea, abdominal pain, heartburn, or change in bowel habits  : NEGATIVE for unusual urinary or vaginal  "symptoms. No vaginal bleeding.  MUSCULOSKELETAL:POSITIVE for right foot fracture.  N: NEGATIVE for weakness, dizziness or paresthesias  P: NEGATIVE for changes in mood or affect      OBJECTIVE:   /83  Pulse 90  Resp 20  Ht 5' 2\" (1.575 m)  Wt 174 lb (78.9 kg)  SpO2 95%  BMI 31.83 kg/m2  Physical Exam  GENERAL: healthy, alert and no distress  EYES: Eyes grossly normal to inspection, PERRL and conjunctivae and sclerae normal  HENT: ear canals and TM's normal, nose and mouth without ulcers or lesions  NECK: no adenopathy, no asymmetry, masses, or scars and thyroid normal to palpation  RESP: lungs clear to auscultation - no rales, rhonchi or wheezes  BREAST: normal without masses, tenderness or nipple discharge and no palpable axillary masses or adenopathy  CV: regular rate and rhythm, normal S1 S2, no S3 or S4, no murmur, click or rub, no peripheral edema and peripheral pulses strong  ABDOMEN: soft, nontender, no hepatosplenomegaly, no masses and bowel sounds normal   (female): normal female external genitalia, normal urethral meatus, vaginal mucosa pink, moist, well rugated, and normal cervix/adnexa/uterus without masses or discharge  MS: no gross musculoskeletal defects noted, no edema  SKIN: no suspicious lesions or rashes  NEURO: Normal strength and tone, mentation intact and speech normal  PSYCH: mentation appears normal, affect normal/bright  DM foot exam: Diabetic foot exam: normal DP and PT pulses, no trophic changes or ulcerative lesions and normal sensory exam      ASSESSMENT/PLAN:     (Z00.00) Routine general medical examination at a health care facility  (primary encounter diagnosis)  Comment:   Plan:     (Z12.31) Visit for screening mammogram  Comment:   Plan: MA SCREENING DIGITAL BILAT - Future  (s+30)            (Z13.89) Screening for diabetic peripheral neuropathy  Comment:   Plan: FOOT EXAM  NO CHARGE [04311.114]            (Z23) Need for prophylactic vaccination against Streptococcus " "pneumoniae (pneumococcus)  Comment:   Plan:     (E11.65) Type 2 diabetes mellitus with hyperglycemia, without long-term current use of insulin (H)  Comment:   Plan: Pneumococcal vaccine 13 valent PCV13 IM         (Prevnar) [18596], blood glucose monitoring         (ACCU-CHEK SMARTVIEW) test strip, blood glucose        monitoring (ACCU-CHEK FASTCLIX) lancets,         metFORMIN (GLUCOPHAGE-XR) 500 MG 24 hr tablet        Continue care with [facility] pharmacy    (I10) Hypertension goal BP (blood pressure) < 140/90  Comment: Controlled  Plan: hydrochlorothiazide (HYDRODIURIL) 25 MG tablet        Refills given    (E78.5) Hyperlipidemia with target LDL less than 130  Comment: Controlled  Plan: simvastatin (ZOCOR) 20 MG tablet        Refills given    (R09.81) Nasal congestion  Comment: Improved  Plan: fluticasone (FLONASE) 50 MCG/ACT spray        Refills given          COUNSELING:  Reviewed preventive health counseling, as reflected in patient instructions         reports that she is a non-smoker but has been exposed to tobacco smoke. She has never used smokeless tobacco.    Estimated body mass index is 31.83 kg/(m^2) as calculated from the following:    Height as of this encounter: 5' 2\" (1.575 m).    Weight as of this encounter: 174 lb (78.9 kg).   Weight management plan: Discussed healthy diet and exercise guidelines and patient will follow up in 12 months in clinic to re-evaluate.    Counseling Resources:  ATP IV Guidelines  Pooled Cohorts Equation Calculator  Breast Cancer Risk Calculator  FRAX Risk Assessment  ICSI Preventive Guidelines  Dietary Guidelines for Americans, 2010  USDA's MyPlate  ASA Prophylaxis  Lung CA Screening    DANGELO Lockwood CNP  [facility]  Answers for HPI/ROS submitted by the patient on 4/18/2018   PHQ-2 Score: 0    "

## 2018-04-18 NOTE — NURSING NOTE
Screening Questionnaire for Adult Immunization    Are you sick today?   No   Do you have allergies to medications, food, a vaccine component or latex?   No   Have you ever had a serious reaction after receiving a vaccination?   No   Do you have a long-term health problem with heart disease, lung disease, asthma, kidney disease, metabolic disease (e.g. diabetes), anemia, or other blood disorder?   Yes   Do you have cancer, leukemia, HIV/AIDS, or any other immune system problem?   No   In the past 3 months, have you taken medications that affect  your immune system, such as prednisone, other steroids, or anticancer drugs; drugs for the treatment of rheumatoid arthritis, Crohn s disease, or psoriasis; or have you had radiation treatments?   Yes   Have you had a seizure, or a brain or other nervous system problem?   No   During the past year, have you received a transfusion of blood or blood     products, or been given immune (gamma) globulin or antiviral drug?   No   For women: Are you pregnant or is there a chance you could become        pregnant during the next month?   No   Have you received any vaccinations in the past 4 weeks?   No     Immunization questionnaire was positive for at least one answer.  Notified CO.        Per orders of Dr. ARIAS, injection of PCV13 given by Kandy Elaine. Patient instructed to remain in clinic for 15 minutes afterwards, and to report any adverse reaction to me immediately.       Screening performed by Kandy Elaine on 4/18/2018 at 5:46 PM.

## 2018-04-18 NOTE — MR AVS SNAPSHOT
After Visit Summary   4/18/2018    Inger Wegener    MRN: [id number]           Patient Information     Date Of Birth          1960        Visit Information        Provider Department      4/18/2018 4:20 PM Diane Diaz APRN CNP [facility]        Today's Diagnoses     Routine general medical examination at a health care facility    -  1    Visit for screening mammogram        Screening for diabetic peripheral neuropathy        Need for prophylactic vaccination against Streptococcus pneumoniae (pneumococcus)        Type 2 diabetes mellitus with hyperglycemia, without long-term current use of insulin (H)        Acute sinusitis with symptoms > 10 days        Hypertension goal BP (blood pressure) < 140/90        Hyperlipidemia with target LDL less than 130          Care Instructions      Preventive Health Recommendations  Female Ages 50 - 64    Yearly exam: See your health care provider every year in order to  o Review health changes.   o Discuss preventive care.    o Review your medicines if your doctor has prescribed any.      Get a Pap test every three years (unless you have an abnormal result and your provider advises testing more often).    If you get Pap tests with HPV test, you only need to test every 5 years, unless you have an abnormal result.     You do not need a Pap test if your uterus was removed (hysterectomy) and you have not had cancer.    You should be tested each year for STDs (sexually transmitted diseases) if you're at risk.     Have a mammogram every 1 to 2 years.    Have a colonoscopy at age 50, or have a yearly FIT test (stool test). These exams screen for colon cancer.      Have a cholesterol test every 5 years, or more often if advised.    Have a diabetes test (fasting glucose) every three years. If you are at risk for diabetes, you should have this test more often.     If you are at risk for osteoporosis (brittle bone disease), think about having  a bone density scan (DEXA).    Shots: Get a flu shot each year. Get a tetanus shot every 10 years.    Nutrition:     Eat at least 5 servings of fruits and vegetables each day.    Eat whole-grain bread, whole-wheat pasta and brown rice instead of white grains and rice.    Talk to your provider about Calcium and Vitamin D.     Lifestyle    Exercise at least 150 minutes a week (30 minutes a day, 5 days a week). This will help you control your weight and prevent disease.    Limit alcohol to one drink per day.    No smoking.     Wear sunscreen to prevent skin cancer.     See your dentist every six months for an exam and cleaning.    See your eye doctor every 1 to 2 years.            Follow-ups after your visit        Your next 10 appointments already scheduled     May 07, 2018  7:15 AM CDT   [facility] Visit [facility] Yeyo Davis DPM   [facility]    [address] [place] [id number]   [phone]            Jun 06, 2018  4:30 PM CDT   SHORT with Yash Ornelas RPH   [facility] ([facility])    [address] [place] [address]   [phone]              Future tests that were ordered for you today     Open Future Orders        Priority Expected Expires Ordered    MA SCREENING DIGITAL BILAT - Future  (s+30) Routine  4/18/2019 4/18/2018            Who to contact     If you have questions or need follow up information about today's clinic visit or your schedule please contact [facility] directly at [phone].  Normal or non-critical lab and imaging results will be communicated to you by MyChart, letter or phone within 4 business days after the clinic has received the results. If you do not hear from us within 7 days, please contact the clinic through MyChart or phone. If you have a critical or abnormal lab result, we will notify you by phone as soon as possible.  Submit refill  "requests through [employer] or call your pharmacy and they will forward the refill request to us. Please allow 3 business days for your refill to be completed.          Additional Information About Your Visit        [employer] Information     [employer] gives you secure access to your electronic health record. If you see a primary care provider, you can also send messages to your care team and make appointments. If you have questions, please call your primary care clinic.  If you do not have a primary care provider, please call [phone] and they will assist you.        Care EveryWhere ID     This is your Care EveryWhere ID. This could be used by other organizations to access your [facility] medical records  [id number]        Your Vitals Were     Pulse Respirations Height Pulse Oximetry BMI (Body Mass Index)       90 20 5' 2\" (1.575 m) 95% 31.83 kg/m2        Blood Pressure from Last 3 Encounters:   04/18/18 135/83   04/09/18 130/72   03/26/18 127/78    Weight from Last 3 Encounters:   04/18/18 174 lb (78.9 kg)   04/09/18 174 lb (78.9 kg)   03/26/18 174 lb (78.9 kg)              We Performed the Following     FOOT EXAM  NO CHARGE [82142.114]     Pneumococcal vaccine 13 valent PCV13 IM (Prevnar) [71038]          Today's Medication Changes          These changes are accurate as of 4/18/18  5:31 PM.  If you have any questions, ask your nurse or doctor.               These medicines have changed or have updated prescriptions.        Dose/Directions    fluticasone 50 MCG/ACT spray   Commonly known as:  FLONASE   This may have changed:  See the new instructions.   Used for:  Acute sinusitis with symptoms > 10 days        Dose:  2 spray   Spray 2 sprays into both nostrils daily   Quantity:  48 mL   Refills:  3       hydrochlorothiazide 25 MG tablet   Commonly known as:  HYDRODIURIL   This may have changed:  See the new instructions.   Used for:  Hypertension goal BP (blood pressure) < 140/90        Dose:  25 mg   Take 1 tablet " (25 mg) by mouth daily   Quantity:  90 tablet   Refills:  3       metFORMIN 500 MG 24 hr tablet   Commonly known as:  GLUCOPHAGE-XR   This may have changed:  See the new instructions.   Used for:  Type 2 diabetes mellitus with hyperglycemia, without long-term current use of insulin (H)        Dose:  1000 mg   Take 2 tablets (1,000 mg) by mouth 2 times daily (with meals)   Quantity:  360 tablet   Refills:  3       simvastatin 20 MG tablet   Commonly known as:  ZOCOR   This may have changed:  See the new instructions.   Used for:  Hyperlipidemia with target LDL less than 130        Dose:  20 mg   Take 1 tablet (20 mg) by mouth At Bedtime   Quantity:  90 tablet   Refills:  3            Where to get your medicines      These medications were sent to [id number] - [place], [place] - [address] AT [facility] [address]  [address], [place] [address]    Hours:  24-hours Phone:  [phone]     blood glucose monitoring lancets    fluticasone 50 MCG/ACT spray    hydrochlorothiazide 25 MG tablet    metFORMIN 500 MG 24 hr tablet    simvastatin 20 MG tablet         Some of these will need a paper prescription and others can be bought over the counter.  Ask your nurse if you have questions.     Bring a paper prescription for each of these medications     blood glucose monitoring test strip                Primary Care Provider Office Phone # Fax #    DANGELO Verduzco [facility] [phone] [address] [place] [address]        Equal Access to Services     MARIBEL HOOD AH: Hadii leslie carter Soabril, waaxda luqadaha, qaybta kaalmada adeegyada, fernanda lemos. So [facility] [phone].    ATENCIÓN: Si habla español, tiene a bullard disposición servicios gratuitos de asistencia lingüística. Llame al [phone].    We comply with applicable federal civil rights laws and [place] laws. We do not discriminate on the basis of race,  color, national origin, age, disability, sex, sexual orientation, or gender identity.            Thank you!     Thank you for choosing [facility]  for your care. Our goal is always to provide you with excellent care. Hearing back from our patients is one way we can continue to improve our services. Please take a few minutes to complete the written survey that you may receive in the mail after your visit with us. Thank you!             Your Updated Medication List - Protect others around you: Learn how to safely use, store and throw away your medicines at www.disposemymeds.org.          This list is accurate as of 4/18/18  5:31 PM.  Always use your most recent med list.                   Brand Name Dispense Instructions for use Diagnosis    ADVIL PO      Take 400 mg by mouth as needed        ASPIRIN NOT PRESCRIBED    INTENTIONAL     continuous prn for other Reported on 3/8/2017        blood glucose lancing device     1 each    Device to be used with lancets.    Type 2 diabetes mellitus with hyperglycemia, without long-term current use of insulin (H)       blood glucose monitoring lancets     300 each    Use to test blood sugar 2 times daily or as directed.  Ok to substitute alternative if insurance prefers.    Type 2 diabetes mellitus with hyperglycemia, without long-term current use of insulin (H)       blood glucose monitoring test strip    ACCU-CHEK SMARTVIEW    180 each    Use to test blood sugar 2 times daily or as directed.  Ok to substitute alternative if insurance prefers. Profile Rx: patient will contact pharmacy when needed    Type 2 diabetes mellitus with hyperglycemia, without long-term current use of insulin (H)       fluticasone 50 MCG/ACT spray    FLONASE    48 mL    Spray 2 sprays into both nostrils daily    Acute sinusitis with symptoms > 10 days       hydrochlorothiazide 25 MG tablet    HYDRODIURIL    90 tablet    Take 1 tablet (25 mg) by mouth daily    Hypertension goal BP  (blood pressure) < 140/90       metFORMIN 500 MG 24 hr tablet    GLUCOPHAGE-XR    360 tablet    Take 2 tablets (1,000 mg) by mouth 2 times daily (with meals)    Type 2 diabetes mellitus with hyperglycemia, without long-term current use of insulin (H)       naproxen 500 MG tablet    NAPROSYN    60 tablet    Take 1 tablet (500 mg) by mouth 2 times daily (with meals)    Foot injury, right, initial encounter       order for DME     2 Units    Equipment being ordered: Compression stocking, medium compression    Type 2 diabetes mellitus with hyperglycemia, without long-term current use of insulin (H)       order for DME     1 Device    Knee Walker/rollabout Length of use: Three months    Closed fracture of right foot, initial encounter       order for DME     1 Device    Medium surgical shoe    Closed fracture of right foot, initial encounter       simvastatin 20 MG tablet    ZOCOR    90 tablet    Take 1 tablet (20 mg) by mouth At Bedtime    Hyperlipidemia with target LDL less than 130       venlafaxine 37.5 MG 24 hr capsule    EFFEXOR-XR    90 capsule    Take 1 capsule (37.5 mg) by mouth 3 times daily    Adjustment disorder with mixed anxiety and depressed mood

## 2018-04-19 ENCOUNTER — TELEPHONE (OUTPATIENT)
Dept: FAMILY MEDICINE | Facility: CLINIC | Age: 58
End: 2018-04-19

## 2018-04-19 PROBLEM — R09.81 NASAL CONGESTION: Status: ACTIVE | Noted: 2018-04-19

## 2018-04-19 NOTE — TELEPHONE ENCOUNTER
Called patient and left a voicemail to call back.    Esther Wilson CMA ([facility])  Podiatry/Foot & Ankle Surgery  [facility]

## 2018-04-19 NOTE — TELEPHONE ENCOUNTER
Dr Davis,  Please call pt work comp - she has questions about the amount of time pt is off of work.  Can she do light duty work?   Thanks!     Beatriz Canela RN

## 2018-04-19 NOTE — TELEPHONE ENCOUNTER
I'd be ok with light duty, but is this an option?  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  Please call pt and see what the options are for her.  Thanks.

## 2018-05-04 ENCOUNTER — RADIANT APPOINTMENT (OUTPATIENT)
Dept: MAMMOGRAPHY | Facility: CLINIC | Age: 58
End: 2018-05-04
Payer: COMMERCIAL

## 2018-05-04 DIAGNOSIS — Z12.31 VISIT FOR SCREENING MAMMOGRAM: ICD-10-CM

## 2018-05-04 PROCEDURE — 77067 SCR MAMMO BI INCL CAD: CPT | Mod: TC

## 2018-05-07 ENCOUNTER — OFFICE VISIT (OUTPATIENT)
Dept: PODIATRY | Facility: CLINIC | Age: 58
End: 2018-05-07
Payer: OTHER MISCELLANEOUS

## 2018-05-07 ENCOUNTER — RADIANT APPOINTMENT (OUTPATIENT)
Dept: GENERAL RADIOLOGY | Facility: CLINIC | Age: 58
End: 2018-05-07
Attending: PODIATRIST
Payer: COMMERCIAL

## 2018-05-07 VITALS
HEART RATE: 84 BPM | SYSTOLIC BLOOD PRESSURE: 126 MMHG | HEIGHT: 62 IN | WEIGHT: 173 LBS | BODY MASS INDEX: 31.83 KG/M2 | DIASTOLIC BLOOD PRESSURE: 88 MMHG

## 2018-05-07 DIAGNOSIS — S92.901D CLOSED FRACTURE OF RIGHT FOOT WITH ROUTINE HEALING, SUBSEQUENT ENCOUNTER: Primary | ICD-10-CM

## 2018-05-07 PROCEDURE — 73630 X-RAY EXAM OF FOOT: CPT | Mod: RT

## 2018-05-07 PROCEDURE — 99213 OFFICE O/P EST LOW 20 MIN: CPT | Performed by: PODIATRIST

## 2018-05-07 ASSESSMENT — PAIN SCALES - GENERAL: PAINLEVEL: NO PAIN (1)

## 2018-05-07 NOTE — PATIENT INSTRUCTIONS
[facility] Locations        Mondays Tuesdays   [facility]   [address] [facility] [address]   [place] [address] [place] [address]   ph: [phone] ph: [phone]   fax: [phone] fax: [phone]       Wednesdays Thursdays - Surgery   [facility] Scheduling - [place]: [phone]   [address] [place] [address] To Schedule an appointment please call:   ph: [phone]   fax: [phone]          Begin protected weight bearing in the boot.  Begin physical therapy.  Follow up in 4 weeks.      PRICE Therapy    Many aches and pains throughout the foot and ankle can be helped with many simple treatments.  This is usually described as PRICE Therapy.      P - Protection - often times, inflammation/pain in the lower extremity is not able to improve simply because the areas involved are never allowed to rest.  Every step we take can bother the problematic area.  Protecting those areas is an important step in the healing process.  This may involve a walking cast boot, a special insert/orthotic device, an ankle brace, or simply avoiding barefoot walking.    R - Rest - in addition to protecting the foot/ankle, resting is an important, but often times difficult, treatment option.  Getting off your feet when they bother you, and specifically avoiding activities that cause pain/discomfort, are very beneficial to prevent, and treat, foot/ankle pain.      I - Ice - icing regularly can help to decrease inflammation and swelling in the foot, thus decreasing pain.  Using an ice pack or a bag of frozen peas works very well.  Ice for 20 minutes multiple times per day as needed.  Do not place the ice directly on the skin as this can cause tissue damage.    C - Compression - using a compression wrap or an ACE wrap can help to decrease swelling, which can help to decrease pain.  Wearing the wraps is  generally not needed at night, but they should be worn on a regular basis when you are going to be on your feet for prolonged periods as gravity tends to pull fluids down to your feet/ankles.    E - Elevation - elevating your lower extremities multiple times daily for 15-20 minutes can help to decrease swelling, which works well in decreasing pain levels.      NSAID/Tylenol - An anti-inflammatory, like Aleve or ibuprofen, and/or a pain medication, such as Tylenol, can help to improve pain levels and get the issue resolved sooner rather than later.  Anyone with liver issues should be careful with Tylenol, and anyone with high blood pressure or heart, stomach or kidney issues should be careful with anti-inflammatories.  Please ask if you have questions about these medications, including dosage.          Body Mass Index (BMI)  Many things can cause foot and ankle problems. Foot structure, activity level, foot mechanics and injuries are common causes of pain.  One very important issue that often goes unmentioned is body weight. Extra weight can cause increased stress on muscles, ligaments, bones and tendons. Sometimes just a few extra pounds is all it takes to put one over her/his threshold. Without reducing that stress, it can be difficult to alleviate pain.   Some people are uncomfortable addressing this issue, but we feel it is important for you to think about it. As Foot & Ankle specialists, our job is addressing the lower extremity problem and possible causes.   Regarding extra body weight, we encourage patients to discuss diet and weight management plans with their primary care doctors. It is this team approach that gives you the best opportunity for pain relief and getting you back on your feet.         Patient to follow up with Primary Care provider regarding elevated blood pressure.

## 2018-05-07 NOTE — NURSING NOTE
"No chief complaint on file.      Initial There were no vitals taken for this visit. Estimated body mass index is 31.83 kg/(m^2) as calculated from the following:    Height as of 4/18/18: 5' 2\" (1.575 m).    Weight as of 4/18/18: 174 lb (78.9 kg).          "

## 2018-05-07 NOTE — LETTER
[address] [address]  Phone: [phone]    May 7, 2018        Inger Wegener  [address] [facility] [address]          To whom it may concern:    RE: Inger Wegener    Patient was seen and treated today at our clinic.    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  Current restrictions for the next 4 weeks include CAM boot on right foot, weight bearing as tolerated with crutches and/or rollabout as needed.    Please contact me for questions or concerns.      Sincerely,        Yeyo Davis DPM

## 2018-05-07 NOTE — MR AVS SNAPSHOT
After Visit Summary   5/7/2018    Inger Wegener    MRN: [id number]           Patient Information     Date Of Birth          1960        Visit Information        Provider Department      5/7/2018 7:15 AM Yeyo Davis DPM [facility]        Today's Diagnoses     Closed fracture of right foot with routine healing, subsequent encounter    -  1      Care Instructions      Dr [facility] Locations        Mondays Tuesdays   [facility]   [address] [facility] [address]   [place] [address] [place] [address]   ph: [phone] ph: [phone]   fax: [phone] fax: [phone]       Wednesdays Thursdays - Surgery   [facility] Scheduling - [place]: [phone]   [address] [place] [address] To Schedule an appointment please call:   ph: [phone]   fax: [phone]          Begin protected weight bearing in the boot.  Begin physical therapy.  Follow up in 4 weeks.      PRICE Therapy    Many aches and pains throughout the foot and ankle can be helped with many simple treatments.  This is usually described as PRICE Therapy.      P - Protection - often times, inflammation/pain in the lower extremity is not able to improve simply because the areas involved are never allowed to rest.  Every step we take can bother the problematic area.  Protecting those areas is an important step in the healing process.  This may involve a walking cast boot, a special insert/orthotic device, an ankle brace, or simply avoiding barefoot walking.    R - Rest - in addition to protecting the foot/ankle, resting is an important, but often times difficult, treatment option.  Getting off your feet when they bother you, and specifically avoiding activities that cause pain/discomfort, are very beneficial to prevent, and treat, foot/ankle pain.      I - Ice - icing regularly can help  to decrease inflammation and swelling in the foot, thus decreasing pain.  Using an ice pack or a bag of frozen peas works very well.  Ice for 20 minutes multiple times per day as needed.  Do not place the ice directly on the skin as this can cause tissue damage.    C - Compression - using a compression wrap or an ACE wrap can help to decrease swelling, which can help to decrease pain.  Wearing the wraps is generally not needed at night, but they should be worn on a regular basis when you are going to be on your feet for prolonged periods as gravity tends to pull fluids down to your feet/ankles.    E - Elevation - elevating your lower extremities multiple times daily for 15-20 minutes can help to decrease swelling, which works well in decreasing pain levels.      NSAID/Tylenol - An anti-inflammatory, like Aleve or ibuprofen, and/or a pain medication, such as Tylenol, can help to improve pain levels and get the issue resolved sooner rather than later.  Anyone with liver issues should be careful with Tylenol, and anyone with high blood pressure or heart, stomach or kidney issues should be careful with anti-inflammatories.  Please ask if you have questions about these medications, including dosage.          Body Mass Index (BMI)  Many things can cause foot and ankle problems. Foot structure, activity level, foot mechanics and injuries are common causes of pain.  One very important issue that often goes unmentioned is body weight. Extra weight can cause increased stress on muscles, ligaments, bones and tendons. Sometimes just a few extra pounds is all it takes to put one over her/his threshold. Without reducing that stress, it can be difficult to alleviate pain.   Some people are uncomfortable addressing this issue, but we feel it is important for you to think about it. As Foot & Ankle specialists, our job is addressing the lower extremity problem and possible causes.   Regarding extra body weight, we encourage patients  to discuss diet and weight management plans with their primary care doctors. It is this team approach that gives you the best opportunity for pain relief and getting you back on your feet.         Patient to follow up with Primary Care provider regarding elevated blood pressure.              Follow-ups after your visit        Additional Services     [facility] PT, HAND, AND CHIROPRACTIC REFERRAL       **This order will print in the [facility] Scheduling Office**    Physical Therapy, Hand Therapy and Chiropractic Care are available through:    *[facility]  *[facility]  *[facility]    Call one number to schedule at any of the above locations: [phone].    Your provider has referred you to: Physical Therapy at [facility] or [facility]    Indication/Reason for Referral: R foot fracture, 6 wks  Onset of Illness: 6 wks  Therapy Orders: Evaluate and Treat  Special Programs: None  Special Request: None    Josue Salas      Additional Comments for the Therapist or Chiropractor: pt is wt bearing in boot    Please be aware that coverage of these services is subject to the terms and limitations of your health insurance plan.  Call member services at your health plan with any benefit or coverage questions.      Please bring the following to your appointment:    *Your personal calendar for scheduling future appointments  *Comfortable clothing                  Your next 10 appointments already scheduled     Jun 06, 2018  4:30 PM CDT   SHORT with Yash Ornleas RPH   [facility] ([facility])    [address] [place] [address] [phone]              Who to contact     If you have questions or need follow up information about today's clinic visit or your schedule please contact [facility] directly at [phone].  Normal or non-critical lab and imaging results will be communicated to you by MyChart, letter or  "phone within 4 business days after the clinic has received the results. If you do not hear from us within 7 days, please contact the clinic through [employer] or phone. If you have a critical or abnormal lab result, we will notify you by phone as soon as possible.  Submit refill requests through [employer] or call your pharmacy and they will forward the refill request to us. Please allow 3 business days for your refill to be completed.          Additional Information About Your Visit        [employer] Information     [employer] gives you secure access to your electronic health record. If you see a primary care provider, you can also send messages to your care team and make appointments. If you have questions, please call your primary care clinic.  If you do not have a primary care provider, please call [phone] and they will assist you.        Care EveryWhere ID     This is your Care EveryWhere ID. This could be used by other organizations to access your [place] medical records  [id number]        Your Vitals Were     Pulse Height BMI (Body Mass Index)             84 5' 2\" (1.575 m) 31.64 kg/m2          Blood Pressure from Last 3 Encounters:   05/07/18 126/88   04/18/18 135/83   04/09/18 130/72    Weight from Last 3 Encounters:   05/07/18 173 lb (78.5 kg)   04/18/18 174 lb (78.9 kg)   04/09/18 174 lb (78.9 kg)              We Performed the Following     [facility] PT, HAND, AND CHIROPRACTIC REFERRAL     XR Foot Right G/E 3 Views        Primary Care Provider Office Phone # Fax #    DANGELO Verduzco [phone] [address] [place] [address]        Equal Access to Services     MARIBEL HOOD : Hadii leslie Valle, waaxda luqadaha, qaybta kaalmada christine, fernanda lemso. So [facility] [phone].    ATENCIÓN: Si habla español, tiene a bullard disposición servicios gratuitos de asistencia lingüística. Llame al [phone].    We comply with applicable " federal civil rights laws and [place] laws. We do not discriminate on the basis of race, color, national origin, age, disability, sex, sexual orientation, or gender identity.            Thank you!     Thank you for choosing [facility]  for your care. Our goal is always to provide you with excellent care. Hearing back from our patients is one way we can continue to improve our services. Please take a few minutes to complete the written survey that you may receive in the mail after your visit with us. Thank you!             Your Updated Medication List - Protect others around you: Learn how to safely use, store and throw away your medicines at www.disposemymeds.org.          This list is accurate as of 5/7/18  8:10 AM.  Always use your most recent med list.                   Brand Name Dispense Instructions for use Diagnosis    ADVIL PO      Take 400 mg by mouth as needed        ASPIRIN NOT PRESCRIBED    INTENTIONAL     continuous prn for other Reported on 3/8/2017        blood glucose lancing device     1 each    Device to be used with lancets.    Type 2 diabetes mellitus with hyperglycemia, without long-term current use of insulin (H)       blood glucose monitoring lancets     300 each    Use to test blood sugar 2 times daily or as directed.  Ok to substitute alternative if insurance prefers.    Type 2 diabetes mellitus with hyperglycemia, without long-term current use of insulin (H)       blood glucose monitoring test strip    ACCU-CHEK SMARTVIEW    180 each    Use to test blood sugar 2 times daily or as directed.  Ok to substitute alternative if insurance prefers. Profile Rx: patient will contact pharmacy when needed    Type 2 diabetes mellitus with hyperglycemia, without long-term current use of insulin (H)       fluticasone 50 MCG/ACT spray    FLONASE    48 mL    Spray 2 sprays into both nostrils daily    Nasal congestion       hydrochlorothiazide 25 MG tablet    HYDRODIURIL    90 tablet     Take 1 tablet (25 mg) by mouth daily    Hypertension goal BP (blood pressure) < 140/90       metFORMIN 500 MG 24 hr tablet    GLUCOPHAGE-XR    360 tablet    Take 2 tablets (1,000 mg) by mouth 2 times daily (with meals)    Type 2 diabetes mellitus with hyperglycemia, without long-term current use of insulin (H)       naproxen 500 MG tablet    NAPROSYN    60 tablet    Take 1 tablet (500 mg) by mouth 2 times daily (with meals)    Foot injury, right, initial encounter       order for DME     2 Units    Equipment being ordered: Compression stocking, medium compression    Type 2 diabetes mellitus with hyperglycemia, without long-term current use of insulin (H)       order for DME     1 Device    Knee Walker/rollabout Length of use: Three months    Closed fracture of right foot, initial encounter       order for DME     1 Device    Medium surgical shoe    Closed fracture of right foot, initial encounter       simvastatin 20 MG tablet    ZOCOR    90 tablet    Take 1 tablet (20 mg) by mouth At Bedtime    Hyperlipidemia with target LDL less than 130       venlafaxine 37.5 MG 24 hr capsule    EFFEXOR-XR    90 capsule    Take 1 capsule (37.5 mg) by mouth 3 times daily    Adjustment disorder with mixed anxiety and depressed mood

## 2018-05-07 NOTE — PROGRESS NOTES
"PATIENT HISTORY:  Inger Wegener is a 57 year old female who presents to clinic for recheck of R foot metatarsal fractures.  6 wk duration.  1-3/10 pain, much improved.  Worse with pressure.  She is primarily non wt bearing in the boot.  She has been off work.  We had called pt several times to discuss possible light duty options after her work comp inquired if this would be allowed, but I do not see record of her calling back, though she states she did.  Based on our discussion at last visit on 4/9 it did not sound like light duty was an option, so I felt it would be better for her to be off work as she was struggling with her restrictions and the 3rd metatarsal shifted in position.  She states today she doesn't feel that light duty would have been accommodated at her workplace.  Denies fevers, new injury.  Nonsmoker.  Diabetic.  Family hx of DM.     EXAM:Vitals: /88  Pulse 84  Ht 5' 2\" (1.575 m)  Wt 173 lb (78.5 kg)  BMI 31.64 kg/m2  BMI= Body mass index is 31.64 kg/(m^2).    General appearance: Patient is alert and fully cooperative with history & exam.  No sign of distress is noted during the visit.     Dermatologic: Skin is intact to the right foot.  No paronychia or evidence of soft tissue infection is noted.      Vascular: DP & PT pulses are intact & regular on the right. Minimal swelling.  CFT and skin temperature are normal.      Neurologic: Lower extremity sensation is intact to light touch.  No evidence of weakness or contracture in the lower extremities.        Musculoskeletal: Minimal to no pain to midshaft right 2nd and 3rd metatarsals.  No gross ankle deformity noted.  No foot or ankle joint effusion is noted.  No calf pain.     XRs of right foot reviewed with pt:  2nd and 3rd metatarsal fractures with callus formation.      ASSESSMENT: Right 2nd and 3rd metatarsal fractures      PLAN:  Reviewed patient's chart in epic.  Discussed condition and treatment options including pros and " cons.    Progress to weight bearing as tolerated in boot at this time.  RICE.  Begin PT.  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  Use crutches/rollabout prn.  Letter given.  F/u in 4 wks.     Yeyo Davis DPM, FACFAS    Weight management plan: Patient was referred to their PCP to discuss a diet and exercise plan.

## 2018-05-07 NOTE — LETTER
"    5/7/2018         RE: Inger Wegener  [address] [facility] [address]        Dear Colleague,    Thank you for referring your patient, Inger Wegener, to the [facility]. Please see a copy of my visit note below.    PATIENT HISTORY:  Inger Wegener is a 57 year old female who presents to clinic for recheck of R foot metatarsal fractures.  6 wk duration.  1-3/10 pain, much improved.  Worse with pressure.  She is primarily NWB in the boot.  She has been off work.  Denies fevers, new injury.  Nonsmoker.  Diabetic.  Family hx of DM.     EXAM:Vitals: /88  Pulse 84  Ht 5' 2\" (1.575 m)  Wt 173 lb (78.5 kg)  BMI 31.64 kg/m2  BMI= Body mass index is 31.64 kg/(m^2).    General appearance: Patient is alert and fully cooperative with history & exam.  No sign of distress is noted during the visit.     Dermatologic: Skin is intact to the right foot.  No paronychia or evidence of soft tissue infection is noted.      Vascular: DP & PT pulses are intact & regular on the right. Minimal swelling.  CFT and skin temperature are normal.      Neurologic: Lower extremity sensation is intact to light touch.  No evidence of weakness or contracture in the lower extremities.        Musculoskeletal: Minimal to no pain to midshaft right 2nd and 3rd metatarsals.  Patient is ambulatory with boot and crutches.  No gross ankle deformity noted.  No foot or ankle joint effusion is noted.  No calf pain.     XRs of right foot reviewed with pt:  2nd and 3rd metatarsal fractures with callus formation.      ASSESSMENT: Right 2nd and 3rd metatarsal fractures      PLAN:  Reviewed patient's chart in epic.  Discussed condition and treatment options including pros and cons.    WBAT in boot at this time.  RICE.  Begin PT.  May return to work with CAM boot, crutches/rollabout prn.  F/u in 4 wks.     Yeyo Davis DPM, FACFAS    Weight management plan: Patient was referred to their PCP to discuss a diet and exercise " plan.      Again, thank you for allowing me to participate in the care of your patient.        Sincerely,        Yeyo Davis DPM

## 2018-05-16 ENCOUNTER — TELEPHONE (OUTPATIENT)
Dept: FAMILY MEDICINE | Facility: CLINIC | Age: 58
End: 2018-05-16

## 2018-05-16 DIAGNOSIS — E11.65 TYPE 2 DIABETES MELLITUS WITH HYPERGLYCEMIA, WITHOUT LONG-TERM CURRENT USE OF INSULIN (H): ICD-10-CM

## 2018-05-16 NOTE — TELEPHONE ENCOUNTER
"Reason for Call:  Other prescription - blood glucose monitoring (ACCU-CHEK SMARTVIEW) test strip    Detailed comments: Patient states that pharmacy will not fill rx. Writer confirmed with pharmacy & they do not have any active rx for this. It appears to be \"Local print\" Patient would like refill asap. Please advise, thank you!    Please send rx to :[facility] [id number] - [place], [place] - [address] AT [facility]    Phone Number Patient can be reached at: Home number on file [phone] (home)    Best Time: anytime    Can we leave a detailed message on this number? YES    Call taken on 5/16/2018 at 12:21 PM by Betty Dempsey        "

## 2018-05-22 ENCOUNTER — THERAPY VISIT (OUTPATIENT)
Dept: PHYSICAL THERAPY | Facility: CLINIC | Age: 58
End: 2018-05-22
Payer: OTHER MISCELLANEOUS

## 2018-05-22 DIAGNOSIS — M25.571 PAIN IN JOINT, ANKLE AND FOOT, RIGHT: Primary | ICD-10-CM

## 2018-05-22 DIAGNOSIS — S82.90XD AFTERCARE FOR HEALING TRAUMATIC FRACTURE OF LOWER LEG, UNSPECIFIED LATERALITY, CLOSED: ICD-10-CM

## 2018-05-22 PROCEDURE — 97110 THERAPEUTIC EXERCISES: CPT | Mod: GP | Performed by: PHYSICAL THERAPIST

## 2018-05-22 PROCEDURE — 97140 MANUAL THERAPY 1/> REGIONS: CPT | Mod: GP | Performed by: PHYSICAL THERAPIST

## 2018-05-22 PROCEDURE — 97162 PT EVAL MOD COMPLEX 30 MIN: CPT | Mod: GP | Performed by: PHYSICAL THERAPIST

## 2018-05-22 NOTE — PROGRESS NOTES
[facility] Initial Evaluation  Subjective:  Patient is a 57 year old female presenting with rehab right ankle/foot hpi. The history is provided by the patient.   Inger Wegener is a 57 year old female with a right foot condition.  Condition occurred with:  A fall/slip.  Condition occurred: at work.  This is a new condition  Date of Injury 3/22/18; mis step at work and fell. 2nd and 3rd metatarsal fracture. Initially on a scooter and on crutches, due to displacement in fracture after 2 wks was off of work for another month with strict NWB status. Returned to work May 7, WBAT in the CAM and using scooter for longer distance    Date of PT orders 5/7/18    PMH: Hx of ankle sprain on the R. Diabetes (controlled with metformin), Overweight, HTN, depression.    Social: . Using CAM and scooter at work.    Patient reports pain:  Metatarsal heads.  Radiates to:  Foot.  Pain is described as aching and is intermittent and reported as 4/10.  Associated symptoms:  Loss of motion/stiffness and edema. Pain is worse in the P.M..  Symptoms are exacerbated by weight bearing, standing, walking, activity, ascending stairs, descending stairs and bending/squatting and relieved by rest, ice and bracing/immobilizing.  Since onset symptoms are gradually improving.  Special tests:  X-ray.      General health as reported by patient is fair.                                              Objective:  Standing Alignment:        Lumbar:  Anterior pelvic tilt      Knee:  Genu valgus R and genu valgus L  Ankle/Foot:  Pes planus R and pes planus L    Gait:    Weight Bearing Status:  WBAT   Assistive Devices:  CAM            Ankle/Foot Evaluation  ROM:    AROM:      Plantarflexion: Left:     Right:  Decreased PF at end range            Strength wnl ankle: make tests pain free inv, eversion, toe flexion.      PALPATION: Palpation of ankle: ttp 2nd and 3rd metatarsals near base.    Right ankle tenderness present at:    anterior talofibular ligament  EDEMA: Edema ankle: general forefoot swelling R foot.                                                              General     ROS    Assessment/Plan:    Patient is a 57 year old female with right side foot/ankle complaints.    Patient has the following significant findings with corresponding treatment plan.                Diagnosis 1:  R foot 2nd and 3rd metatarsal fx with hx of lateral ankle sprain  Pain -  hot/cold therapy, manual therapy, self management, education and home program  Decreased ROM/flexibility - manual therapy, therapeutic exercise and home program  Decreased strength - therapeutic exercise, therapeutic activities and home program  Decreased proprioception - neuro re-education, gait training, therapeutic activities and home program  Edema - cold therapy and self management/home program  Impaired gait - gait training and home program  Decreased function - therapeutic activities and home program  Instability -  Therapeutic Activity  Therapeutic Exercise  Neuromuscular Re-education  Splinting/Taping/Bracing/Orthotic  home program    Therapy Evaluation Codes:   1) History comprised of:   Personal factors that impact the plan of care:      Age, Coping style, Gender, Living environment, Past/current experiences, Profession, Social history/culture, Time since onset of symptoms and Work status.    Comorbidity factors that impact the plan of care are:      Diabetes, Depression and Overweight.     Medications impacting care: None.  2) Examination of Body Systems comprised of:   Body structures and functions that impact the plan of care:      Ankle and Toes.   Activity limitations that impact the plan of care are:      Bathing, Bending, Cooking, Driving, Dressing, Lifting, Sitting, Squatting/kneeling, Stairs, Standing, Walking and Working.  3) Clinical presentation characteristics are:   Evolving/Changing.  4) Decision-Making    Moderate complexity using standardized patient  assessment instrument and/or measureable assessment of functional outcome.  Cumulative Therapy Evaluation is: Moderate complexity.    Previous and current functional limitations:  (See Goal Flow Sheet for this information)    Short term and Long term goals: (See Goal Flow Sheet for this information)     Communication ability:  Patient appears to be able to clearly communicate and understand verbal and written communication and follow directions correctly.  Treatment Explanation - The following has been discussed with the patient:   RX ordered/plan of care  Anticipated outcomes  Possible risks and side effects  This patient would benefit from PT intervention to resume normal activities.   Rehab potential is good.    Frequency:  2 X week, once daily  Duration:  for 2 weeks tapering to 1 X a week over 6 weeks  Discharge Plan:  Achieve all LTG.  Independent in home treatment program.  Reach maximal therapeutic benefit.    Please refer to the daily flowsheet for treatment today, total treatment time and time spent performing 1:1 timed codes.

## 2018-05-23 PROBLEM — M25.571 PAIN IN JOINT, ANKLE AND FOOT, RIGHT: Status: ACTIVE | Noted: 2018-05-23

## 2018-05-23 PROBLEM — S82.90XD AFTERCARE FOR HEALING TRAUMATIC FRACTURE OF LOWER LEG, UNSPECIFIED LATERALITY, CLOSED: Status: ACTIVE | Noted: 2018-05-23

## 2018-05-25 NOTE — PROGRESS NOTES
[facility] Initial Evaluation  Subjective:  Patient is a 57 year old female presenting with rehab left ankle/foot hpi.                                      Pertinent medical history includes:  Diabetes, overweight, high blood pressure and depression.  Medical allergies: no.    Current medications:  High blood pressure medication and anti-depressants.  Current occupation is teacher.    Primary job tasks include:  Driving, lifting and prolonged standing.                                Objective:  System    Physical Exam    General     ROS    Assessment/Plan:

## 2018-05-29 ENCOUNTER — THERAPY VISIT (OUTPATIENT)
Dept: PHYSICAL THERAPY | Facility: CLINIC | Age: 58
End: 2018-05-29
Payer: OTHER MISCELLANEOUS

## 2018-05-29 DIAGNOSIS — S82.90XD AFTERCARE FOR HEALING TRAUMATIC FRACTURE OF LOWER LEG, UNSPECIFIED LATERALITY, CLOSED: ICD-10-CM

## 2018-05-29 DIAGNOSIS — M25.571 PAIN IN JOINT, ANKLE AND FOOT, RIGHT: ICD-10-CM

## 2018-05-29 PROCEDURE — 97140 MANUAL THERAPY 1/> REGIONS: CPT | Mod: GP | Performed by: PHYSICAL THERAPIST

## 2018-05-29 PROCEDURE — 97110 THERAPEUTIC EXERCISES: CPT | Mod: GP | Performed by: PHYSICAL THERAPIST

## 2018-05-29 NOTE — MR AVS SNAPSHOT
After Visit Summary   5/29/2018    Inger Wegener    MRN: [id number]           Patient Information     Date Of Birth          1960        Visit Information        Provider Department      5/29/2018 4:40 PM Natalia Garcia, ZACKARY [facility]        Today's Diagnoses     Pain in joint, ankle and foot, right        Aftercare for healing traumatic fracture of lower leg, unspecified laterality, closed           Follow-ups after your visit        Your next 10 appointments already scheduled     Jun 04, 2018  7:15 AM CDT   Return Visit with Yeyo Davis DPM   [facility] ([facility])    [address] [place] [id number]   [phone]            Jun 06, 2018  4:30 PM CDT   SHORT with Yash Ornelas [facility] ([facility])    [address] [place] [address]   [phone]            Jun 26, 2018  2:00 PM CDT   DEEPTI Extremity with Natalia Garcia PT   [facility] ([facility]  )    [address] [place] [address]   [phone]            Jul 03, 2018  2:00 PM CDT   DEEPTI Extremity with Natalia Garcia PT   [facility] ([facility]  )    [address] [place] [address]   [phone]            Jul 10, 2018  2:00 PM CDT   DEEPTI Extremity with Natalia Garcia PT   [facility] ([facility]  )    [address] [place] [address]   [phone]              Who to contact     If you have questions or need follow up information about today's clinic visit or your schedule please contact [facility] PH[facility] directly at [phone].  Normal or non-critical lab and imaging results will be communicated  to you by [employer], letter or phone within 4 business days after the clinic has received the results. If you do not hear from us within 7 days, please contact the clinic through [employer] or phone. If you have a critical or abnormal lab result, we will notify you by phone as soon as possible.  Submit refill requests through [employer] or call your pharmacy and they will forward the refill request to us. Please allow 3 business days for your refill to be completed.          Additional Information About Your Visit        [employer] Information     [employer] gives you secure access to your electronic health record. If you see a primary care provider, you can also send messages to your care team and make appointments. If you have questions, please call your primary care clinic.  If you do not have a primary care provider, please call [phone] and they will assist you.        Care EveryWhere ID     This is your Care EveryWhere ID. This could be used by other organizations to access your [facility] medical records  [id number]         Blood Pressure from Last 3 Encounters:   05/07/18 126/88   04/18/18 135/83   04/09/18 130/72    Weight from Last 3 Encounters:   05/07/18 78.5 kg (173 lb)   04/18/18 78.9 kg (174 lb)   04/09/18 78.9 kg (174 lb)              We Performed the Following     Manual Ther Tech, 1+Regions, EA 15 min     Therapeutic Exercises        Primary Care Provider Office Phone # Fax #    DANGELO Verduzco [facility] [phone] [address] [place] [address]        Equal Access to Services     [facility] Hospital[facility] : Hadii aad ku hadasho Soomaali, waaxda luqadaha, qaybta kaalmada adeegyada, waxay laura edwards . So [facility] [phone].    ATENCIÓN: Si habla español, tiene a bullard disposición servicios gratuitos de asistencia lingüística. Llame al [phone].    We comply with applicable federal civil rights laws and [place] laws. We do not discriminate on the basis of  race, color, national origin, age, disability, sex, sexual orientation, or gender identity.            Thank you!     Thank you for choosing [facility]  for your care. Our goal is always to provide you with excellent care. Hearing back from our patients is one way we can continue to improve our services. Please take a few minutes to complete the written survey that you may receive in the mail after your visit with us. Thank you!             Your Updated Medication List - Protect others around you: Learn how to safely use, store and throw away your medicines at www.disposemymeds.org.          This list is accurate as of 5/29/18  5:27 PM.  Always use your most recent med list.                   Brand Name Dispense Instructions for use Diagnosis    ADVIL PO      Take 400 mg by mouth as needed        ASPIRIN NOT PRESCRIBED    INTENTIONAL     continuous prn for other Reported on 3/8/2017        blood glucose lancing device     1 each    Device to be used with lancets.    Type 2 diabetes mellitus with hyperglycemia, without long-term current use of insulin (H)       blood glucose monitoring lancets     300 each    Use to test blood sugar 2 times daily or as directed.  Ok to substitute alternative if insurance prefers.    Type 2 diabetes mellitus with hyperglycemia, without long-term current use of insulin (H)       blood glucose monitoring test strip    ACCU-CHEK SMARTVIEW    180 each    Use to test blood sugar 2 times daily or as directed.  Ok to substitute alternative if insurance prefers.    Type 2 diabetes mellitus with hyperglycemia, without long-term current use of insulin (H)       fluticasone 50 MCG/ACT spray    FLONASE    48 mL    Spray 2 sprays into both nostrils daily    Nasal congestion       hydrochlorothiazide 25 MG tablet    HYDRODIURIL    90 tablet    Take 1 tablet (25 mg) by mouth daily    Hypertension goal BP (blood pressure) < 140/90       metFORMIN  500 MG 24 hr tablet    GLUCOPHAGE-XR    360 tablet    Take 2 tablets (1,000 mg) by mouth 2 times daily (with meals)    Type 2 diabetes mellitus with hyperglycemia, without long-term current use of insulin (H)       naproxen 500 MG tablet    NAPROSYN    60 tablet    Take 1 tablet (500 mg) by mouth 2 times daily (with meals)    Foot injury, right, initial encounter       order for DME     2 Units    Equipment being ordered: Compression stocking, medium compression    Type 2 diabetes mellitus with hyperglycemia, without long-term current use of insulin (H)       order for DME     1 Device    Knee Walker/rollabout Length of use: Three months    Closed fracture of right foot, initial encounter       order for DME     1 Device    Medium surgical shoe    Closed fracture of right foot, initial encounter       simvastatin 20 MG tablet    ZOCOR    90 tablet    Take 1 tablet (20 mg) by mouth At Bedtime    Hyperlipidemia with target LDL less than 130       venlafaxine 37.5 MG 24 hr capsule    EFFEXOR-XR    90 capsule    Take 1 capsule (37.5 mg) by mouth 3 times daily    Adjustment disorder with mixed anxiety and depressed mood

## 2018-06-04 ENCOUNTER — RADIANT APPOINTMENT (OUTPATIENT)
Dept: GENERAL RADIOLOGY | Facility: CLINIC | Age: 58
End: 2018-06-04
Attending: PODIATRIST
Payer: COMMERCIAL

## 2018-06-04 ENCOUNTER — OFFICE VISIT (OUTPATIENT)
Dept: PODIATRY | Facility: CLINIC | Age: 58
End: 2018-06-04
Payer: OTHER MISCELLANEOUS

## 2018-06-04 VITALS
DIASTOLIC BLOOD PRESSURE: 78 MMHG | HEIGHT: 62 IN | TEMPERATURE: 98.2 F | RESPIRATION RATE: 20 BRPM | WEIGHT: 173 LBS | BODY MASS INDEX: 31.83 KG/M2 | HEART RATE: 80 BPM | SYSTOLIC BLOOD PRESSURE: 136 MMHG

## 2018-06-04 DIAGNOSIS — S92.901D CLOSED FRACTURE OF RIGHT FOOT WITH ROUTINE HEALING, SUBSEQUENT ENCOUNTER: Primary | ICD-10-CM

## 2018-06-04 PROCEDURE — 73630 X-RAY EXAM OF FOOT: CPT | Mod: RT

## 2018-06-04 PROCEDURE — 99213 OFFICE O/P EST LOW 20 MIN: CPT | Performed by: PODIATRIST

## 2018-06-04 NOTE — PROGRESS NOTES
"PATIENT HISTORY:  Inger Wegener is a 57 year old female who presents to clinic for recheck of R foot metatarsal fractures.  DOI 3/22/18 at work.  Pt is WBAT in her boot.  No pain in boot.  Denies fevers, new injury.  Nonsmoker.  Diabetic.  Family hx of DM.  [place].     EXAM:  /78 (BP Location: Right arm, Patient Position: Sitting, Cuff Size: Adult Regular)  Pulse 80  Temp 98.2  F (36.8  C) (Oral)  Resp 20  Ht 5' 2\" (1.575 m)  Wt 173 lb (78.5 kg)  BMI 31.64 kg/m2    General appearance: Patient is alert and fully cooperative with history & exam.  No sign of distress is noted during the visit.     Dermatologic: Skin is intact to the right foot.  No paronychia or evidence of soft tissue infection is noted.      Vascular: DP & PT pulses are intact & regular on the right. Minimal swelling.  CFT and skin temperature are normal.      Neurologic: Lower extremity sensation is intact to light touch.  No evidence of weakness or contracture in the lower extremities.        Musculoskeletal: Minimal to no pain to midshaft right 2nd and 3rd metatarsals.  No gross ankle deformity noted.  No foot or ankle joint effusion is noted.  No calf pain.     XRs of right foot reviewed with pt:  2nd and 3rd metatarsal fractures with callus formation.      ASSESSMENT: Right 2nd and 3rd metatarsal fractures      PLAN:  Reviewed patient's chart in epic.  Discussed condition and treatment options including pros and cons.    Progress to regular supportive stiff soled shoes as tolerated.  Pt to take post op shoe or boot on trip just in case.  Discussed risk of DVT with travel/immobilization.  Knee high compression and ROM exercises advised.  Gradual increase in activity.  F/u prn.     Yeyo Davis DPM, FACFAS    Weight management plan: Patient was referred to their PCP to discuss a diet and exercise plan.    "

## 2018-06-04 NOTE — LETTER
"    6/4/2018         RE: Inger Wegener  [address] [address]        Dear Colleague,    Thank you for referring your patient, Inger Wegener, to the [facility]. Please see a copy of my visit note below.    PATIENT HISTORY:  Inger Wegener is a 57 year old female who presents to clinic for recheck of R foot metatarsal fractures.  DOI 3/22/18 at work.  Pt is WBAT in her boot.  No pain in boot.  Denies fevers, new injury.  Nonsmoker.  Diabetic.  Family hx of DM.  [place].     EXAM:  /78 (BP Location: Right arm, Patient Position: Sitting, Cuff Size: Adult Regular)  Pulse 80  Temp 98.2  F (36.8  C) (Oral)  Resp 20  Ht 5' 2\" (1.575 m)  Wt 173 lb (78.5 kg)  BMI 31.64 kg/m2    General appearance: Patient is alert and fully cooperative with history & exam.  No sign of distress is noted during the visit.     Dermatologic: Skin is intact to the right foot.  No paronychia or evidence of soft tissue infection is noted.      Vascular: DP & PT pulses are intact & regular on the right. Minimal swelling.  CFT and skin temperature are normal.      Neurologic: Lower extremity sensation is intact to light touch.  No evidence of weakness or contracture in the lower extremities.        Musculoskeletal: Minimal to no pain to midshaft right 2nd and 3rd metatarsals.  No gross ankle deformity noted.  No foot or ankle joint effusion is noted.  No calf pain.     XRs of right foot reviewed with pt:  2nd and 3rd metatarsal fractures with callus formation.      ASSESSMENT: Right 2nd and 3rd metatarsal fractures      PLAN:  Reviewed patient's chart in epic.  Discussed condition and treatment options including pros and cons.    Progress to regular supportive stiff soled shoes as tolerated.  Pt to take post op shoe or boot on trip just in case.  Discussed risk of DVT with travel/immobilization.  Knee high compression and ROM exercises advised.  Gradual increase in " activity.  F/u prn.     Yeyo Davis DPM, FACFAS    Weight management plan: Patient was referred to their PCP to discuss a diet and exercise plan.      Again, thank you for allowing me to participate in the care of your patient.        Sincerely,        Yeyo Davis DPM

## 2018-06-04 NOTE — MR AVS SNAPSHOT
After Visit Summary   6/4/2018    Inger Wegener    MRN: [id number]           Patient Information     Date Of Birth          1960        Visit Information        Provider Department      6/4/2018 7:15 AM Yeyo Davis DPM [facility]        Today's Diagnoses     Closed fracture of right foot with routine healing, subsequent encounter    -  1      Care Instructions    Transition to supportive shoes as tolerated  Increase activity gradually        Body Mass Index (BMI)  Many things can cause foot and ankle problems. Foot structure, activity level, foot mechanics and injuries are common causes of pain.  One very important issue that often goes unmentioned is body weight. Extra weight can cause increased stress on muscles, ligaments, bones and tendons. Sometimes just a few extra pounds is all it takes to put one over her/his threshold. Without reducing that stress, it can be difficult to alleviate pain.   Some people are uncomfortable addressing this issue, but we feel it is important for you to think about it. As Foot & Ankle specialists, our job is addressing the lower extremity problem and possible causes.   Regarding extra body weight, we encourage patients to discuss diet and weight management plans with their primary care doctors. It is this team approach that gives you the best opportunity for pain relief and getting you back on your feet.             Follow-ups after your visit        Follow-up notes from your care team     Return if symptoms worsen or fail to improve.      Your next 10 appointments already scheduled     Jun 06, 2018  4:30 PM CDT   SHORT with Yash Ornelas RPH   [facility] ([facility])    [address] [place] [address]   [phone]            Jun 26, 2018  2:00 PM CDT   DEEPTI Extremity with Natalia Garcia, ZACKARY   [facility] ([facility]  [address] [address]   [phone]            Jul 03, 2018  2:00 PM CDT   DEEPTI Extremity with Natalia Garcia PT   [facility] ([facility]  )    [address] [place] [address]   [phone]            Jul 10, 2018  2:00 PM CDT   DEEPTI Extremity with Natalia Garcia PT   [facility] ([facility]  )    [address] [place] [address]   [phone]              Who to contact     If you have questions or need follow up information about today's clinic visit or your schedule please contact [facility] directly at [phone].  Normal or non-critical lab and imaging results will be communicated to you by MyChart, letter or phone within 4 business days after the clinic has received the results. If you do not hear from us within 7 days, please contact the clinic through [employer] or phone. If you have a critical or abnormal lab result, we will notify you by phone as soon as possible.  Submit refill requests through [employer] or call your pharmacy and they will forward the refill request to us. Please allow 3 business days for your refill to be completed.          Additional Information About Your Visit        MyChart Information     [employer] gives you secure access to your electronic health record. If you see a primary care provider, you can also send messages to your care team and make appointments. If you have questions, please call your primary care clinic.  If you do not have a primary care provider, please call [phone] and they will assist you.        Care EveryWhere ID     This is your Care EveryWhere ID. This could be used by other organizations to access your [place] medical records  [id number]        Your Vitals Were     Pulse Temperature Respirations Height BMI (Body Mass Index)       80 98.2  F (36.8  C) (Oral)  "20 5' 2\" (1.575 m) 31.64 kg/m2        Blood Pressure from Last 3 Encounters:   06/04/18 136/78   05/07/18 126/88   04/18/18 135/83    Weight from Last 3 Encounters:   06/04/18 173 lb (78.5 kg)   05/07/18 173 lb (78.5 kg)   04/18/18 174 lb (78.9 kg)              We Performed the Following     XR Foot Right G/E 3 Views        Primary Care Provider Office Phone # Fax #    Diane CHANEL Joe, DANGELO [facility] [phone] [address] [place] [address]        Equal Access to Services     MARIBEL HOOD : Hadii leslie ruedao Soabril, waaxda luqadaha, qaybta kaalmada adeegyada, fernanda edwards . So [facility] [phone].    ATENCIÓN: Si habla español, tiene a bullard disposición servicios gratuitos de asistencia lingüística. Llame al [phone].    We comply with applicable federal civil rights laws and [place] laws. We do not discriminate on the basis of race, color, national origin, age, disability, sex, sexual orientation, or gender identity.            Thank you!     Thank you for choosing [facility]  for your care. Our goal is always to provide you with excellent care. Hearing back from our patients is one way we can continue to improve our services. Please take a few minutes to complete the written survey that you may receive in the mail after your visit with us. Thank you!             Your Updated Medication List - Protect others around you: Learn how to safely use, store and throw away your medicines at www.disposemymeds.org.          This list is accurate as of 6/4/18  8:06 AM.  Always use your most recent med list.                   Brand Name Dispense Instructions for use Diagnosis    ADVIL PO      Take 400 mg by mouth as needed        ASPIRIN NOT PRESCRIBED    INTENTIONAL     continuous prn for other Reported on 3/8/2017        blood glucose lancing device     1 each    Device to be used with lancets.    Type 2 diabetes mellitus with " hyperglycemia, without long-term current use of insulin (H)       blood glucose monitoring lancets     300 each    Use to test blood sugar 2 times daily or as directed.  Ok to substitute alternative if insurance prefers.    Type 2 diabetes mellitus with hyperglycemia, without long-term current use of insulin (H)       blood glucose monitoring test strip    ACCU-CHEK SMARTVIEW    180 each    Use to test blood sugar 2 times daily or as directed.  Ok to substitute alternative if insurance prefers.    Type 2 diabetes mellitus with hyperglycemia, without long-term current use of insulin (H)       fluticasone 50 MCG/ACT spray    FLONASE    48 mL    Spray 2 sprays into both nostrils daily    Nasal congestion       hydrochlorothiazide 25 MG tablet    HYDRODIURIL    90 tablet    Take 1 tablet (25 mg) by mouth daily    Hypertension goal BP (blood pressure) < 140/90       metFORMIN 500 MG 24 hr tablet    GLUCOPHAGE-XR    360 tablet    Take 2 tablets (1,000 mg) by mouth 2 times daily (with meals)    Type 2 diabetes mellitus with hyperglycemia, without long-term current use of insulin (H)       naproxen 500 MG tablet    NAPROSYN    60 tablet    Take 1 tablet (500 mg) by mouth 2 times daily (with meals)    Foot injury, right, initial encounter       order for DME     2 Units    Equipment being ordered: Compression stocking, medium compression    Type 2 diabetes mellitus with hyperglycemia, without long-term current use of insulin (H)       order for DME     1 Device    Knee Walker/rollabout Length of use: Three months    Closed fracture of right foot, initial encounter       order for DME     1 Device    Medium surgical shoe    Closed fracture of right foot, initial encounter       simvastatin 20 MG tablet    ZOCOR    90 tablet    Take 1 tablet (20 mg) by mouth At Bedtime    Hyperlipidemia with target LDL less than 130       venlafaxine 37.5 MG 24 hr capsule    EFFEXOR-XR    90 capsule    Take 1 capsule (37.5 mg) by mouth 3 times  daily    Adjustment disorder with mixed anxiety and depressed mood

## 2018-06-04 NOTE — PATIENT INSTRUCTIONS
Transition to supportive shoes as tolerated  Increase activity gradually        Body Mass Index (BMI)  Many things can cause foot and ankle problems. Foot structure, activity level, foot mechanics and injuries are common causes of pain.  One very important issue that often goes unmentioned is body weight. Extra weight can cause increased stress on muscles, ligaments, bones and tendons. Sometimes just a few extra pounds is all it takes to put one over her/his threshold. Without reducing that stress, it can be difficult to alleviate pain.   Some people are uncomfortable addressing this issue, but we feel it is important for you to think about it. As Foot & Ankle specialists, our job is addressing the lower extremity problem and possible causes.   Regarding extra body weight, we encourage patients to discuss diet and weight management plans with their primary care doctors. It is this team approach that gives you the best opportunity for pain relief and getting you back on your feet.

## 2018-06-06 ENCOUNTER — OFFICE VISIT (OUTPATIENT)
Dept: PHARMACY | Facility: CLINIC | Age: 58
End: 2018-06-06
Payer: COMMERCIAL

## 2018-06-06 VITALS
HEART RATE: 84 BPM | OXYGEN SATURATION: 98 % | WEIGHT: 175.2 LBS | BODY MASS INDEX: 32.04 KG/M2 | SYSTOLIC BLOOD PRESSURE: 154 MMHG | DIASTOLIC BLOOD PRESSURE: 87 MMHG

## 2018-06-06 DIAGNOSIS — F43.23 ADJUSTMENT DISORDER WITH MIXED ANXIETY AND DEPRESSED MOOD: ICD-10-CM

## 2018-06-06 DIAGNOSIS — Z13.6 CARDIOVASCULAR SCREENING; LDL GOAL LESS THAN 160: ICD-10-CM

## 2018-06-06 DIAGNOSIS — E55.9 VITAMIN D DEFICIENCY: ICD-10-CM

## 2018-06-06 DIAGNOSIS — E11.65 TYPE 2 DIABETES MELLITUS WITH HYPERGLYCEMIA, WITHOUT LONG-TERM CURRENT USE OF INSULIN (H): Primary | ICD-10-CM

## 2018-06-06 DIAGNOSIS — I10 HYPERTENSION GOAL BP (BLOOD PRESSURE) < 140/90: ICD-10-CM

## 2018-06-06 DIAGNOSIS — E11.65 TYPE 2 DIABETES MELLITUS WITH HYPERGLYCEMIA, WITHOUT LONG-TERM CURRENT USE OF INSULIN (H): ICD-10-CM

## 2018-06-06 DIAGNOSIS — E53.8 VITAMIN B12 DEFICIENCY (NON ANEMIC): ICD-10-CM

## 2018-06-06 LAB — HBA1C MFR BLD: 7 % (ref 0–5.6)

## 2018-06-06 PROCEDURE — 36415 COLL VENOUS BLD VENIPUNCTURE: CPT | Performed by: NURSE PRACTITIONER

## 2018-06-06 PROCEDURE — 99607 MTMS BY PHARM ADDL 15 MIN: CPT | Performed by: PHARMACIST

## 2018-06-06 PROCEDURE — 83036 HEMOGLOBIN GLYCOSYLATED A1C: CPT | Performed by: NURSE PRACTITIONER

## 2018-06-06 PROCEDURE — 99606 MTMS BY PHARM EST 15 MIN: CPT | Performed by: PHARMACIST

## 2018-06-06 PROCEDURE — 82306 VITAMIN D 25 HYDROXY: CPT | Performed by: NURSE PRACTITIONER

## 2018-06-06 PROCEDURE — 84443 ASSAY THYROID STIM HORMONE: CPT | Performed by: NURSE PRACTITIONER

## 2018-06-06 RX ORDER — ACETAMINOPHEN 160 MG
1 TABLET,DISINTEGRATING ORAL DAILY
COMMUNITY

## 2018-06-06 RX ORDER — UREA 10 %
500 LOTION (ML) TOPICAL DAILY
COMMUNITY

## 2018-06-06 NOTE — PROGRESS NOTES
SUBJECTIVE/OBJECTIVE:                                                    Inger Wegener is a 58 year old female coming in for a follow-up visit (12-26-17) for Medication Therapy Management.  She was referred to me from Diane, Her PCP.       Today is her initial 2018 [facility] visit.    Chief Complaint:   Pt. States here for a1c and tsh and vit-d --she broke her foot March and was out of work --ate poorly-no weight bearing x 4 weeks --ate and drank more alcohol.    --she states will be reflected in her bs meter readings .    FYI she has had subclinical hypothyroidism in the past elevated TSH with no real overt symptoms requesting a recheck of her TSH lab today we will do that.      Personal Healthcare Goals: Control diabetes with the least amount of medications possible.     Allergies/ADRs: Reviewed in Epic. 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 Had gestational diabetes with her last child.     Medication Adherence: no issues reported by patient    Diabetes:  Pt currently taking Metformin 500 mg er tablets-2 twice a day.  Pt is not experiencing side effects. Had diarrhea when starting metformin but that has mostly resolved.   Patient has a fear of needles. If we want to start considering an injectable, she would like something like trulicity -she fears needles.  Feels weakness before eating.   SMBG: see chart below.             303 bs last Friday - indulged in birthday week !         Symptoms of low blood sugar? shaky, dizzy, weak, sweaty. Frequency of hypoglycemia?   Recent symptoms of high blood sugar? none  Eye exam: 3-13-17--had eye exam for dm --all good .  Due for 2018 eye exam.  Foot exam: due  Microalbumin is < 30 mg/g. Pt is not  taking an ACEi/ARB.  Aspirin: Not taking  Diet/Exercise: Walk her dog every other day, go on bike rides 10 blocks 2 times weely, garden, mow the lawn. Working on eating more protein. Has reduced sugar and carbohydrate intake drastically. Patient has one treat each night of a laughing cow ice cream sandwich (30 grams) at bedtime.     fyi--she admits likes the starchy  Carbs.  Patient still struggles with eating food late at night.      Hypertension. Patient currently taking hydrochlorothiazide 25 mg daily + lisinopril 10mg./day . Pt does not monitor blood pressure at home.     Hyperlipidemia: Current therapy includes simvastatin 20 mg once daily.  Pt reports no significant myalgias or other side effects.   The 10-year ASCVD risk score (Montezumapeter GARCIA Jr, et al., 2013) is: 7.8%    Values used to calculate the score:      Age: 58 years      Sex: Female      Is Non- : No      Diabetic: Yes      Tobacco smoker: No      Systolic Blood Pressure: 154 mmHg      Is BP treated: No      HDL Cholesterol: 48 mg/dL      Total Cholesterol: 193 mg/dL         Depression:  Current medications include: Venlafaxine 37.5mg-1 capsule 3 times a day. Pt reports that depression symptoms are worsened. Current depression symptoms include:no energy , tired, fatigue,overwhelming sadness about anything,  life stuff- memory issus, divorce, work stress. Patient reports the following stressors: job change, seperation/ from significant other and chronic health condition .  PHQ-9 SCORE 5/4/2016 12/29/2016 12/26/2017   Total Score - - -   Total Score MyChart - - -   Total Score 3 1 5               Vitamin D3: level was 35 on 6-20-17.  Rechecking vitamin D level today.  Patient takes 2000 units vitamin D daily plus a calcium with vitamin D pill daily.  Vitamin B12: level was 390 on 6-20-17.  Patient now taking 500 mcg of B12 daily.      Current labs include:  Today's Vitals: /87  Pulse 84  Wt 175 lb 3.2 oz (79.5 kg)   SpO2 98%  BMI 32.04 kg/m2  BP Readings from Last 3 Encounters:   06/06/18 154/87   06/04/18 136/78   05/07/18 126/88     Lab Results   Component Value Date    A1C 7.0 06/06/2018   .  Lab Results   Component Value Date    CHOL 193 12/26/2017     Lab Results   Component Value Date    TRIG 302 12/26/2017     Lab Results   Component Value Date    HDL 48 12/26/2017     Lab Results   Component Value Date    LDL 85 12/26/2017       Liver Function Studies -   Recent Labs   Lab Test  12/26/17   1329   PROTTOTAL  7.4   ALBUMIN  4.3   BILITOTAL  0.3   ALKPHOS  79   AST  13   ALT  30       Lab Results   Component Value Date    UCRR 30 12/26/2017    MICROL <5 12/26/2017    UMALCR Unable to calculate due to low value 12/26/2017       Last Basic Metabolic Panel:  Lab Results   Component Value Date     12/26/2017      Lab Results   Component Value Date    POTASSIUM 3.6 12/26/2017     Lab Results   Component Value Date    CHLORIDE 101 12/26/2017     Lab Results   Component Value Date    BUN 14 12/26/2017     Lab Results   Component Value Date    CR 0.61 12/26/2017     GFR Estimate   Date Value Ref Range Status   12/26/2017 >90 >60 mL/min/1.7m2 Final     Comment:     Non  GFR Calc   12/29/2016 82 >60 mL/min/1.7m2 Final     Comment:     Non  GFR Calc   09/29/2015 75 >60 mL/min/1.7m2 Final     Comment:     Non  GFR Calc     GFR Estimate If Black   Date Value Ref Range Status   12/26/2017 >90 >60 mL/min/1.7m2 Final     Comment:      GFR Calc   12/29/2016 >90   GFR Calc   >60 mL/min/1.7m2 Final   09/29/2015 >90   GFR Calc   >60 mL/min/1.7m2 Final       Most Recent Immunizations   Administered Date(s) Administered     Influenza Vaccine IM 3yrs+ 4 Valent IIV4 10/01/2016     Influenza Vaccine, 3 YRS +, IM (QUADRIVALENT W/PRESERVATIVES) 09/24/2015     Pneumo Conj 13-V (2010&after) 04/18/2018     TDAP Vaccine (Adacel) 11/13/2013     Twinrix  A/B 12/29/2016         ASSESSMENT:                                                       Current medications were reviewed with her today.     Medication Adherence: no issues identified    Diabetes: Stable. Patient is meeting A1c goal of </=7%.  FYI patient to concentrate on diet exercise and we will consider trulicity in September if A1c worsens.      Hypertension: Needs Improvement. Patient is not meeting BP goal of < 140/90mmHg.  Pt would benefit from the following changes - continued home  BP monitoring, watching diet, increasing exercise and weight loss and sodium restriction- discussed patient to try and avoid adding salt to her foods-try adding spices like Mrs. Matthews instead.      Depression: Improved, patient doing fairly well at 112-1/2 mg venlafaxine a day repeat PHQ 9 questionnaire in September.    Hyperlipidemia: Stable. Pt is on moderate intensity statin which is indicated based on 2013 ACC/AHA guidelines for lipid management.      Vitamin D3: is not at goal of 50-75ng/mL. Pt would benefit from lab recheck today result is pending.  Vitamin B12: is not at goal of 600-1000pg/mL. Pt would benefit from vitamin B12 lab in 4 months.      PLAN:                                                      1. A1c today = 7.0% --excellent control --keep working on spot checking blood sugars 2 days /week --but on those days --check a fasting before breakfast with Goal of , and then check 2 hours post dinner -- Goal blood sugar then is < 150.     fyi--we can consider Trulicity later this year .     To lose weight and keep blood sugars under optimum control -- try to water only fast for 12 hours overnight --from 7pm to 7am .     Watch mychart for other lab results -thyroid and vitamin D .     2. FYI--BP today elevated at 154/87mmhg. --Goal BP is <140/90--please reduce your salting of foods --use Mrs. Dash spices.     Please check home BP's and report to me if > 140/90mmhg. We can consider adding some lisinopril to the  hctz if necessary?         Next MTM visit:    See me in September- 19th at 5pm --bring blood sugar meter to visit.       I spent 30 minutes with this patient today.     My Clinical Pharmacist's contact information:                                                      It was a pleasure seeing you today!  Please feel free to contact me with any questions or concerns you have.      Yash Ornelas Rph.  Medication Therapy Management Provider  [phone]      You may receive a survey about the MTM services you received.  I would appreciate your feedback to help me serve you better in the future. Please fill it out and return it when you can. Your comments will be anonymous.

## 2018-06-06 NOTE — PATIENT INSTRUCTIONS
Recommendations from today's MTM visit:                                                        1. A1c today = 7.0% --excellent control --keep working on spot checking blood sugars 2 days /week --but on those days --check a fasting before breakfast with Goal of , and then check 2 hours post dinner -- Goal blood sugar then is < 150.     fyi--we can consider Trulicity later this year .     To lose weight and keep blood sugars under optimum control -- try to water only fast for 12 hours overnight --from 7pm to 7am .     Watch mychart for other lab results -thyroid and vitamin D .     2. FYI--BP today elevated at 154/87mmhg. --Goal BP is <140/90--please reduce your salting of foods --use Mrs. Dash spices.     Please check home BP's and report to me if > 140/90mmhg. We can consider adding some lisinopril to the hctz if necessary?         Next MTM visit:    See me in September- 19th at 5pm --bring blood sugar meter to visit.     To schedule another MTM appointment, please call the clinic directly or you may call the MTM scheduling line at [phone] or toll-free at [phone].     My Clinical Pharmacist's contact information:                                                      It was a pleasure seeing you today!  Please feel free to contact me with any questions or concerns you have.      Yash Ornelas [facility].  Medication Therapy Management Provider  [phone]  Ricky Reyes, Pharm-D3.     You may receive a survey about the MTM services you received.  I would appreciate your feedback to help me serve you better in the future. Please fill it out and return it when you can. Your comments will be anonymous.

## 2018-06-06 NOTE — MR AVS SNAPSHOT
After Visit Summary   6/6/2018    Inger Wegener    MRN: [id number]           Patient Information     Date Of Birth          1960        Visit Information        Provider Department      6/6/2018 4:30 PM Yash Ornelas RPH [facility]        Today's Diagnoses     Type 2 diabetes mellitus with hyperglycemia, without long-term current use of insulin (H)    -  1    CARDIOVASCULAR SCREENING; LDL GOAL LESS THAN 160        Vitamin D deficiency          Care Instructions    Recommendations from today's MT visit:                                                        1. A1c today = 7.0% --excellent control --keep working on spot checking blood sugars 2 days /week --but on those days --check a fasting before breakfast with Goal of , and then check 2 hours post dinner -- Goal blood sugar then is < 150.     fyi--we can consider Trulicity later this year .     To lose weight and keep blood sugars under optimum control -- try to water only fast for 12 hours overnight --from 7pm to 7am .     Watch mychart for other lab results -thyroid and vitamin D .     2. FYI--BP today elevated at 154/87mmhg. --Goal BP is <140/90--please reduce your salting of foods --use Mrs. Dash spices.     Please check home BP's and report to me if > 140/90mmhg. We can consider adding some lisinopril to the hctz if necessary?         Next [facility] visit:    See me in September- 19th at 5pm --bring blood sugar meter to visit.     To schedule another [facility] appointment, please call the clinic directly or you may call the [facility] scheduling line at [phone] or toll-free at [phone].     My Clinical Pharmacist's contact information:                                                      It was a pleasure seeing you today!  Please feel free to contact me with any questions or concerns you have.      Yash Ornelas Rph.  Medication Therapy Management Provider  [phone]  Ricky Reyes, Pharm-D3.     You may receive  a survey about the [facility] services you received.  I would appreciate your feedback to help me serve you better in the future. Please fill it out and return it when you can. Your comments will be anonymous.                      Follow-ups after your visit        Your next 10 appointments already scheduled     Jun 26, 2018  2:00 PM CDT   DEEPTI Extremity with Natalia Garcia PT   [facility] ([facility]  )    [address] [place] [address]   [phone]            Jul 03, 2018  2:00 PM CDT   DEEPTI Extremity with Natalia Garcia PT   [facility] ([facility]  )    [address] [place] [address]   [phone]            Jul 10, 2018  2:00 PM CDT   DEEPTI Extremity with Natalia Garcia PT   [facility] ([facility]  )    [address] [place] [address]   [phone]            Sep 19, 2018  5:00 PM CDT   SHORT with Yash Ornelas RPH   [facility] ([facility])    [address] [place] [address]   [phone]              Future tests that were ordered for you today     Open Future Orders        Priority Expected Expires Ordered    Lipid panel reflex to direct LDL Fasting Routine  10/6/2018 6/6/2018            Who to contact     If you have questions or need follow up information about today's clinic visit or your schedule please contact [facility] directly at [phone].  Normal or non-critical lab and imaging results will be communicated to you by MyChart, letter or phone within 4 business days after the clinic has received the results. If you do not hear from us within 7 days, please contact the clinic through MyChart or phone. If you have a critical or abnormal lab result, we will notify you by phone as soon as possible.  Submit  refill requests through [employer] or call your pharmacy and they will forward the refill request to us. Please allow 3 business days for your refill to be completed.          Additional Information About Your Visit        [employer] Information     [employer] gives you secure access to your electronic health record. If you see a primary care provider, you can also send messages to your care team and make appointments. If you have questions, please call your primary care clinic.  If you do not have a primary care provider, please call [phone] and they will assist you.        Care EveryWhere ID     This is your Care EveryWhere ID. This could be used by other organizations to access your [facility] medical records  [id number]        Your Vitals Were     Pulse Pulse Oximetry BMI (Body Mass Index)             84 98% 32.04 kg/m2          Blood Pressure from Last 3 Encounters:   06/06/18 154/87   06/04/18 136/78   05/07/18 126/88    Weight from Last 3 Encounters:   06/06/18 175 lb 3.2 oz (79.5 kg)   06/04/18 173 lb (78.5 kg)   05/07/18 173 lb (78.5 kg)                 Today's Medication Changes          These changes are accurate as of 6/6/18  5:48 PM.  If you have any questions, ask your nurse or doctor.               These medicines have changed or have updated prescriptions.        Dose/Directions    venlafaxine 37.5 MG 24 hr capsule   Commonly known as:  EFFEXOR-XR   This may have changed:  when to take this   Used for:  Adjustment disorder with mixed anxiety and depressed mood        Dose:  37.5 mg   Take 1 capsule (37.5 mg) by mouth 3 times daily   Quantity:  90 capsule   Refills:  2                Primary Care Provider Office Phone # Fax DANGELO Trinh [phone] [address] [place] [address]        Equal Access to Services     MARIBEL HOOD : Sushila Valle, jeannie miller, fernanda sun.  [facility] [phone].    ATENCIÓN: Si habla choco, tiene a bullard disposición servicios gratuitos de asistencia lingüística. Rodney bermudez [phone].    We comply with applicable federal civil rights laws and [place] laws. We do not discriminate on the basis of race, color, national origin, age, disability, sex, sexual orientation, or gender identity.            Thank you!     Thank you for choosing [facility]  for your care. Our goal is always to provide you with excellent care. Hearing back from our patients is one way we can continue to improve our services. Please take a few minutes to complete the written survey that you may receive in the mail after your visit with us. Thank you!             Your Updated Medication List - Protect others around you: Learn how to safely use, store and throw away your medicines at www.disposemymeds.org.          This list is accurate as of 6/6/18  5:48 PM.  Always use your most recent med list.                   Brand Name Dispense Instructions for use Diagnosis    ASPIRIN NOT PRESCRIBED    INTENTIONAL     continuous prn for other Reported on 3/8/2017        blood glucose lancing device     1 each    Device to be used with lancets.    Type 2 diabetes mellitus with hyperglycemia, without long-term current use of insulin (H)       blood glucose monitoring lancets     300 each    Use to test blood sugar 2 times daily or as directed.  Ok to substitute alternative if insurance prefers.    Type 2 diabetes mellitus with hyperglycemia, without long-term current use of insulin (H)       blood glucose monitoring test strip    ACCU-CHEK SMARTVIEW    180 each    Use to test blood sugar 2 times daily or as directed.  Ok to substitute alternative if insurance prefers.    Type 2 diabetes mellitus with hyperglycemia, without long-term current use of insulin (H)       CALCIUM 600+D3 600-400 MG-UNIT Tabs   Generic drug:  Calcium Carbonate-Vitamin D3      Take 1 tablet by mouth  daily        cyanocolbalamin 500 MCG tablet    vitamin  B-12     Take 500 mcg by mouth daily        fluticasone 50 MCG/ACT spray    FLONASE    48 mL    Spray 2 sprays into both nostrils daily    Nasal congestion       hydrochlorothiazide 25 MG tablet    HYDRODIURIL    90 tablet    Take 1 tablet (25 mg) by mouth daily    Hypertension goal BP (blood pressure) < 140/90       metFORMIN 500 MG 24 hr tablet    GLUCOPHAGE-XR    360 tablet    Take 2 tablets (1,000 mg) by mouth 2 times daily (with meals)    Type 2 diabetes mellitus with hyperglycemia, without long-term current use of insulin (H)       order for DME     2 Units    Equipment being ordered: Compression stocking, medium compression    Type 2 diabetes mellitus with hyperglycemia, without long-term current use of insulin (H)       simvastatin 20 MG tablet    ZOCOR    90 tablet    Take 1 tablet (20 mg) by mouth At Bedtime    Hyperlipidemia with target LDL less than 130       venlafaxine 37.5 MG 24 hr capsule    EFFEXOR-XR    90 capsule    Take 1 capsule (37.5 mg) by mouth 3 times daily    Adjustment disorder with mixed anxiety and depressed mood       vitamin D3 2000 units Caps      Take 1 capsule by mouth daily

## 2018-06-07 LAB — TSH SERPL DL<=0.005 MIU/L-ACNC: 3.44 MU/L (ref 0.4–4)

## 2018-06-08 LAB — DEPRECATED CALCIDIOL+CALCIFEROL SERPL-MC: 33 UG/L (ref 20–75)

## 2018-06-08 NOTE — PROGRESS NOTES
6-8-18      mtm reviewed Vit-d lab result --33 --too low --will increase her dunlap d3 dose to 2x2,000 iu's  And recheck level in 6 months.    Yash Ornelas Rph.  Medication Therapy Management Provider  [phone]

## 2018-06-27 DIAGNOSIS — E11.65 TYPE 2 DIABETES MELLITUS WITH HYPERGLYCEMIA, WITHOUT LONG-TERM CURRENT USE OF INSULIN (H): ICD-10-CM

## 2018-06-27 NOTE — TELEPHONE ENCOUNTER
Requested Prescriptions   Pending Prescriptions Disp Refills     metFORMIN (GLUCOPHAGE-XR) 500 MG 24 hr tablet [Pharmacy Med Name: METFORMIN ER 500MG 24HR TABS]  Last Written Prescription Date:  4/18/2018  Last Fill Quantity: 360 tablet,  # refills: 3   Last Office Visit: 4/18/2018   Future Office Visit:    Next 5 appointments (look out 90 days)     Sep 19, 2018  5:00 PM CDT   SHORT with Yash Ornelas RPH   [facility] ([facility])    [address] [place] [address]   [phone]                360 tablet 0     Sig: TAKE 2 TABLETS BY MOUTH TWICE DAILY WITH MEALS    Biguanide Agents Failed    6/27/2018  4:09 AM       Failed - Blood pressure less than 140/90 in past 6 months    BP Readings from Last 3 Encounters:   06/06/18 154/87   06/04/18 136/78   05/07/18 126/88          Passed - Patient has documented LDL within the past 12 mos.    Recent Labs   Lab Test  12/26/17   1329   LDL  85          Passed - Patient has had a Microalbumin in the past 12 mos.    Recent Labs   Lab Test  12/26/17   1330   MICROL  <5   UMALCR  Unable to calculate due to low value          Passed - Patient is age 10 or older       Passed - Patient has documented A1c within the specified period of time.    If HgbA1C is 8 or greater, it needs to be on file within the past 3 months.  If less than 8, must be on file within the past 6 months.     Recent Labs   Lab Test  06/06/18   1729   A1C  7.0*          Passed - Patient's CR is NOT>1.4 OR Patient's EGFR is NOT<45 within past 12 mos.    Recent Labs   Lab Test  12/26/17   1329   GFRESTIMATED  >90   GFRESTBLACK  >90     Recent Labs   Lab Test  12/26/17   1329   CR  0.61          Passed - Patient does NOT have a diagnosis of CHF.       Passed - Patient is not pregnant       Passed - Patient has not had a positive pregnancy test within the past 12 mos.        Passed - Recent (6 mo) or future (30 days) visit within the authorizing  "provider's specialty    Patient had office visit in the last 6 months or has a visit in the next 30 days with authorizing provider or within the authorizing provider's specialty.  See \"Patient Info\" tab in inbasket, or \"Choose Columns\" in Meds & Orders section of the refill encounter.              "

## 2018-06-29 RX ORDER — METFORMIN HCL 500 MG
TABLET, EXTENDED RELEASE 24 HR ORAL
Qty: 360 TABLET | Refills: 0 | OUTPATIENT
Start: 2018-06-29

## 2018-06-29 NOTE — TELEPHONE ENCOUNTER
Duplicate  E-Prescribing Status: Receipt confirmed by pharmacy (4/18/2018  5:21 PM CDT)  Marcelina Medina RN, BSN

## 2018-06-30 DIAGNOSIS — F43.23 ADJUSTMENT DISORDER WITH MIXED ANXIETY AND DEPRESSED MOOD: ICD-10-CM

## 2018-06-30 NOTE — TELEPHONE ENCOUNTER
"Requested Prescriptions   Pending Prescriptions Disp Refills     venlafaxine (EFFEXOR-XR) 37.5 MG 24 hr capsule [Pharmacy Med Name: VENLAFAXINE ER 37.5MG CAPSULES]  Last Written Prescription Date:  3/26/2018  Last Fill Quantity: 90 capsules,  # refills: 2   Last office visit: 4/18/2018 with prescribing provider:  Joe   Future Office Visit:   Next 5 appointments (look out 90 days)     Sep 19, 2018  5:00 PM CDT   SHORT with Yash Ornelas [facility] ([facility])    [address] [place] [address]   [phone]                  90 capsule 0     Sig: TAKE 1 CAPSULE BY MOUTH THREE TIMES DAILY    Serotonin-Norepinephrine Reuptake Inhibitors  Failed    6/30/2018  3:51 AM       Failed - Blood pressure under 140/90 in past 12 months    BP Readings from Last 3 Encounters:   06/06/18 154/87   06/04/18 136/78   05/07/18 126/88                Passed - Recent (12 mo) or future (30 days) visit within the authorizing provider's specialty    Patient had office visit in the last 12 months or has a visit in the next 30 days with authorizing provider or within the authorizing provider's specialty.  See \"Patient Info\" tab in inbasket, or \"Choose Columns\" in Meds & Orders section of the refill encounter.           Passed - Patient is age 18 or older       Passed - No active pregnancy on record       Passed - Normal serum creatinine on file in past 12 months    Recent Labs   Lab Test  12/26/17   1329   CR  0.61            Passed - No positive pregnancy test in past 12 months          "

## 2018-07-03 NOTE — TELEPHONE ENCOUNTER
Routing refill request to provider for review/approval because:  Shawna given x1 and patient did not follow up, please advise      TC/reception: please call and schedule visit with provider.  thanks

## 2018-07-05 RX ORDER — VENLAFAXINE HYDROCHLORIDE 37.5 MG/1
CAPSULE, EXTENDED RELEASE ORAL
Qty: 270 CAPSULE | Refills: 3 | Status: SHIPPED | OUTPATIENT
Start: 2018-07-05 | End: 2019-04-23

## 2018-09-20 DIAGNOSIS — E78.5 HYPERLIPIDEMIA WITH TARGET LDL LESS THAN 130: ICD-10-CM

## 2018-09-20 RX ORDER — SIMVASTATIN 20 MG
TABLET ORAL
Qty: 0.01 TABLET | Refills: 0 | OUTPATIENT
Start: 2018-09-20

## 2018-09-20 NOTE — TELEPHONE ENCOUNTER
"Requested Prescriptions   Pending Prescriptions Disp Refills     simvastatin (ZOCOR) 20 MG tablet [Pharmacy Med Name: SIMVASTATIN 20MG TABLETS]      Last Written Prescription Date:  4/28/2018  Last Fill Quantity: 90 TABLETS,  # refills: 3   Last office visit: 4/18/2018 with prescribing provider:  SHIRAZ LOMELI   Future Office Visit:     90 tablet 0     Sig: TAKE 1 TABLET BY MOUTH EVERY NIGHT AT BEDTIME    Statins Protocol Passed    9/20/2018  3:50 AM       Passed - LDL on file in past 12 months    Recent Labs   Lab Test  12/26/17   1329   LDL  85          Passed - No abnormal creatine kinase in past 12 months    No lab results found.          Passed - Recent (12 mo) or future (30 days) visit within the authorizing provider's specialty    Patient had office visit in the last 12 months or has a visit in the next 30 days with authorizing provider or within the authorizing provider's specialty.  See \"Patient Info\" tab in inbasket, or \"Choose Columns\" in Meds & Orders section of the refill encounter.           Passed - Patient is age 18 or older       Passed - No active pregnancy on record       Passed - No positive pregnancy test in past 12 months        "

## 2018-09-21 NOTE — TELEPHONE ENCOUNTER
Refused Prescriptions:                       Disp   Refills    simvastatin (ZOCOR) 20 MG tablet [Pharmacy*0.01 t*0        Sig: TAKE 1 TABLET BY MOUTH EVERY NIGHT AT BEDTIME  Refused By: SHOLA WASHINGTON  Reason for Refusal: Duplicate      Closing encounter - no further actions needed at this time    Shola Washington RN

## 2018-10-11 ENCOUNTER — TELEPHONE (OUTPATIENT)
Dept: FAMILY MEDICINE | Facility: CLINIC | Age: 58
End: 2018-10-11

## 2018-10-11 ENCOUNTER — MYC MEDICAL ADVICE (OUTPATIENT)
Dept: FAMILY MEDICINE | Facility: CLINIC | Age: 58
End: 2018-10-11

## 2019-02-11 ENCOUNTER — OFFICE VISIT (OUTPATIENT)
Dept: URGENT CARE | Facility: URGENT CARE | Age: 59
End: 2019-02-11
Payer: COMMERCIAL

## 2019-02-11 VITALS
TEMPERATURE: 97.8 F | WEIGHT: 179 LBS | SYSTOLIC BLOOD PRESSURE: 146 MMHG | HEART RATE: 86 BPM | DIASTOLIC BLOOD PRESSURE: 88 MMHG | OXYGEN SATURATION: 97 % | BODY MASS INDEX: 32.74 KG/M2

## 2019-02-11 DIAGNOSIS — R82.90 NONSPECIFIC FINDING ON EXAMINATION OF URINE: ICD-10-CM

## 2019-02-11 DIAGNOSIS — N30.00 ACUTE CYSTITIS WITHOUT HEMATURIA: Primary | ICD-10-CM

## 2019-02-11 LAB
ALBUMIN UR-MCNC: 30 MG/DL
APPEARANCE UR: ABNORMAL
BACTERIA #/AREA URNS HPF: ABNORMAL /HPF
BILIRUB UR QL STRIP: NEGATIVE
COLOR UR AUTO: YELLOW
GLUCOSE UR STRIP-MCNC: NEGATIVE MG/DL
HGB UR QL STRIP: ABNORMAL
KETONES UR STRIP-MCNC: NEGATIVE MG/DL
LEUKOCYTE ESTERASE UR QL STRIP: ABNORMAL
NITRATE UR QL: NEGATIVE
PH UR STRIP: 6 PH (ref 5–7)
RBC #/AREA URNS AUTO: ABNORMAL /HPF
SOURCE: ABNORMAL
SP GR UR STRIP: 1.02 (ref 1–1.03)
UROBILINOGEN UR STRIP-ACNC: 1 EU/DL (ref 0.2–1)
WBC #/AREA URNS AUTO: >100 /HPF

## 2019-02-11 PROCEDURE — 81001 URINALYSIS AUTO W/SCOPE: CPT | Performed by: NURSE PRACTITIONER

## 2019-02-11 PROCEDURE — 87088 URINE BACTERIA CULTURE: CPT | Performed by: NURSE PRACTITIONER

## 2019-02-11 PROCEDURE — 87086 URINE CULTURE/COLONY COUNT: CPT | Performed by: NURSE PRACTITIONER

## 2019-02-11 PROCEDURE — 87186 SC STD MICRODIL/AGAR DIL: CPT | Performed by: NURSE PRACTITIONER

## 2019-02-11 PROCEDURE — 99213 OFFICE O/P EST LOW 20 MIN: CPT | Performed by: NURSE PRACTITIONER

## 2019-02-11 RX ORDER — SULFAMETHOXAZOLE/TRIMETHOPRIM 800-160 MG
1 TABLET ORAL 2 TIMES DAILY
Qty: 6 TABLET | Refills: 0 | Status: SHIPPED | OUTPATIENT
Start: 2019-02-11 | End: 2019-02-14

## 2019-02-11 ASSESSMENT — ANXIETY QUESTIONNAIRES
5. BEING SO RESTLESS THAT IT IS HARD TO SIT STILL: NOT AT ALL
7. FEELING AFRAID AS IF SOMETHING AWFUL MIGHT HAPPEN: NOT AT ALL
1. FEELING NERVOUS, ANXIOUS, OR ON EDGE: NOT AT ALL
GAD7 TOTAL SCORE: 0
IF YOU CHECKED OFF ANY PROBLEMS ON THIS QUESTIONNAIRE, HOW DIFFICULT HAVE THESE PROBLEMS MADE IT FOR YOU TO DO YOUR WORK, TAKE CARE OF THINGS AT HOME, OR GET ALONG WITH OTHER PEOPLE: NOT DIFFICULT AT ALL
3. WORRYING TOO MUCH ABOUT DIFFERENT THINGS: NOT AT ALL
6. BECOMING EASILY ANNOYED OR IRRITABLE: NOT AT ALL
2. NOT BEING ABLE TO STOP OR CONTROL WORRYING: NOT AT ALL

## 2019-02-11 ASSESSMENT — PATIENT HEALTH QUESTIONNAIRE - PHQ9
SUM OF ALL RESPONSES TO PHQ QUESTIONS 1-9: 1
5. POOR APPETITE OR OVEREATING: NOT AT ALL

## 2019-02-12 ASSESSMENT — ANXIETY QUESTIONNAIRES: GAD7 TOTAL SCORE: 0

## 2019-02-12 NOTE — PATIENT INSTRUCTIONS
"Patient Education     Bladder Infection, Female (Adult)    Urine is normally doesn't have any bacteria in it. But bacteria can get into the urinary tract from the skin around the rectum. Or they can travel in the blood from elsewhere in the body. Once they are in your urinary tract, they can cause infection in the urethra (urethritis), the bladder (cystitis), or the kidneys (pyelonephritis).  The most common place for an infection is in the bladder. This is called a bladder infection. This is one of the most common infections in women. Most bladder infections are easily treated. They are not serious unless the infection spreads to the kidney.  The phrases \"bladder infection,\" \"UTI,\" and \"cystitis\" are often used to describe the same thing. But they are not always the same. Cystitis is an inflammation of the bladder. The most common cause of cystitis is an infection.  Symptoms  The infection causes inflammation in the urethra and bladder. This causes many of the symptoms. The most common symptoms of a bladder infection are:    Pain or burning when urinating    Having to urinate more often than usual    Urgent need to urinate    Only a small amount of urine comes out    Blood in urine    Abdominal discomfort. This is usually in the lower abdomen above the pubic bone.    Cloudy urine    Strong- or bad-smelling urine    Unable to urinate (urinary retention)    Unable to hold urine in (urinary incontinence)    Fever    Loss of appetite    Confusion (in older adults)  Causes  Bladder infections are not contagious. You can't get one from someone else, from a toilet seat, or from sharing a bath.  The most common cause of bladder infections is bacteria from the bowels. The bacteria get onto the skin around the opening of the urethra. From there, they can get into the urine and travel up to the bladder, causing inflammation and infection. This usually happens because of:    Wiping improperly after urinating. Always wipe from " front to back.    Bowel incontinence    Pregnancy    Procedures such as having a catheter inserted    Older age    Not emptying your bladder. This can allow bacteria a chance to grow in your urine.    Dehydration    Constipation    Sex    Use of a diaphragm for birth control   Treatment  Bladder infections are diagnosed by a urine test. They are treated with antibiotics and usually clear up quickly without complications. Treatment helps prevent a more serious kidney infection.  Medicines  Medicines can help in the treatment of a bladder infection:    Take antibiotics until they are used up, even if you feel better. It is important to finish them to make sure the infection has cleared.    You can use acetaminophen or ibuprofen for pain, fever, or discomfort, unless another medicine was prescribed. If you have chronic liver or kidney disease, talk with your healthcare provider before using these medicines. Also talk with your provider if you've ever had a stomach ulcer or gastrointestinal bleeding, or are taking blood-thinner medicines.    If you are given phenazopydridine to reduce burning with urination, it will cause your urine to become a bright orange color. This can stain clothing.  Care and prevention  These self-care steps can help prevent future infections:    Drink plenty of fluids to prevent dehydration and flush out your bladder. Do this unless you must restrict fluids for other health reasons, or your doctor told you not to.    Proper cleaning after going to the bathroom is important. Wipe from front to back after using the toilet to prevent the spread of bacteria.    Urinate more often. Don't try to hold urine in for a long time.    Wear loose-fitting clothes and cotton underwear. Avoid tight-fitting pants.    Improve your diet and prevent constipation. Eat more fresh fruit and vegetables, and fiber, and less junk and fatty foods.    Avoid sex until your symptoms are gone.    Avoid caffeine, alcohol, and  spicy foods. These can irritate your bladder.    Urinate right after intercourse to flush out your bladder.    If you use birth control pills and have frequent bladder infections, discuss it with your doctor.  Follow-up care  Call your healthcare provider if all symptoms are not gone after 3 days of treatment. This is especially important if you have repeat infections.  If a culture was done, you will be told if your treatment needs to be changed. If directed, you can call to find out the results.  If X-rays were done, you will be told if the results will affect your treatment.  Call 911  Call 911 if any of the following occur:    Trouble breathing    Hard to wake up or confusion    Fainting or loss of consciousness    Rapid heart rate  When to seek medical advice  Call your healthcare provider right away if any of these occur:    Fever of 100.4 F (38.0 C) or higher, or as directed by your healthcare provider    Symptoms are not better by the third day of treatment    Back or belly (abdominal) pain that gets worse    Repeated vomiting, or unable to keep medicine down    Weakness or dizziness    Vaginal discharge    Pain, redness, or swelling in the outer vaginal area (labia)  Date Last Reviewed: 10/1/2016    [id number] [employer] [address] [place] [address], [place] [address]. All rights reserved. This information is not intended as a substitute for professional medical care. Always follow your healthcare professional's instructions.

## 2019-02-12 NOTE — PROGRESS NOTES
SUBJECTIVE:   Inger Wegener is a 58 year old female who  presents today for a possible UTI.   Chief Complaint   Patient presents with     Urgent Care     Pt in clinic to have eval for dysuria.     Dysuria         Symptoms of dysuria and frequency have been going on for 2day(s).  Hematuria no.  gradual onset and worseningand moderate.  There is no history of fever, chills, nausea or vomiting.  No history of vaginal discharge. This patient does not have a history of urinary tract infections.     No past medical history on file.  Current Outpatient Medications   Medication Sig Dispense Refill     blood glucose (ACCU-CHEK FASTCLIX) lancing device Device to be used with lancets. 1 each 0     blood glucose monitoring (ACCU-CHEK FASTCLIX) lancets Use to test blood sugar 2 times daily or as directed.  Ok to substitute alternative if insurance prefers. 300 each 3     blood glucose monitoring (ACCU-CHEK SMARTVIEW) test strip Use to test blood sugar 2 times daily or as directed.  Ok to substitute alternative if insurance prefers. 180 each 3     Calcium Carbonate-Vitamin D3 (CALCIUM 600+D3) 600-400 MG-UNIT TABS Take 1 tablet by mouth daily       Cholecalciferol (VITAMIN D3) 2000 units CAPS Take 1 capsule by mouth daily       cyanocolbalamin (VITAMIN  B-12) 500 MCG tablet Take 500 mcg by mouth daily       fluticasone (FLONASE) 50 MCG/ACT spray Spray 2 sprays into both nostrils daily 48 mL 3     hydrochlorothiazide (HYDRODIURIL) 25 MG tablet Take 1 tablet (25 mg) by mouth daily 90 tablet 3     metFORMIN (GLUCOPHAGE-XR) 500 MG 24 hr tablet Take 2 tablets (1,000 mg) by mouth 2 times daily (with meals) 360 tablet 3     order for DME Equipment being ordered: Compression stocking, medium compression 2 Units 11     simvastatin (ZOCOR) 20 MG tablet Take 1 tablet (20 mg) by mouth At Bedtime 90 tablet 3     venlafaxine (EFFEXOR-XR) 37.5 MG 24 hr capsule TAKE 1 CAPSULE BY MOUTH THREE TIMES DAILY 270 capsule 3     ASPIRIN NOT PRESCRIBED  (INTENTIONAL) continuous prn for other Reported on 3/8/2017       Social History     Tobacco Use     Smoking status: Passive Smoke Exposure - Never Smoker     Smokeless tobacco: Never Used   Substance Use Topics     Alcohol use: Yes       ROS:   Constitutional, HEENT, cardiovascular, pulmonary, GI, , musculoskeletal, neuro, skin, endocrine and psych systems are negative, except as otherwise noted.      OBJECTIVE:  /88   Pulse 86   Temp 97.8  F (36.6  C) (Tympanic)   Wt 81.2 kg (179 lb)   SpO2 97%   BMI 32.74 kg/m    GENERAL: healthy, alert, well nourished, well hydrated, no distress  NECK: no tenderness, no adenopathy, supple  RESP: lungs clear to auscultation - no rales, no rhonchi, no wheezes  CV: regular rates and rhythm, normal S1 S2, and no murmur, no click or rub   ABDOMEN: soft, no tenderness, no  hepatosplenomegaly, no masses, normal bowel sounds. No rebound or guarding.  No CVA tenderness  SKIN: warm and dry  .   Results for orders placed or performed in visit on 02/11/19   *UA reflex to Microscopic and Culture ([facility] (except [place] and [place])   Result Value Ref Range    Color Urine Yellow     Appearance Urine Cloudy     Glucose Urine Negative NEG^Negative mg/dL    Bilirubin Urine Negative NEG^Negative    Ketones Urine Negative NEG^Negative mg/dL    Specific Gravity Urine 1.020 1.003 - 1.035    Blood Urine Moderate (A) NEG^Negative    pH Urine 6.0 5.0 - 7.0 pH    Protein Albumin Urine 30 (A) NEG^Negative mg/dL    Urobilinogen Urine 1.0 0.2 - 1.0 EU/dL    Nitrite Urine Negative NEG^Negative    Leukocyte Esterase Urine Moderate (A) NEG^Negative    Source Midstream Urine    Urine Microscopic   Result Value Ref Range    WBC Urine >100 (A) OTO5^0 - 5 /HPF    RBC Urine 10-25 (A) OTO2^O - 2 /HPF    Bacteria Urine Moderate (A) NEG^Negative /HPF         ASSESSMENT:   Lower, uncomplicated urinary tract infection.    PLAN:  As per ordered above  Drink plenty of fluids.   Prevention and treatment of UTI's discussed.Signs and symptoms of pyelonephritis mentioned.  Follow up with primary care physician if not improving

## 2019-02-13 LAB
BACTERIA SPEC CULT: ABNORMAL
SPECIMEN SOURCE: ABNORMAL

## 2019-03-11 ENCOUNTER — TELEPHONE (OUTPATIENT)
Dept: FAMILY MEDICINE | Facility: CLINIC | Age: 59
End: 2019-03-11

## 2019-03-11 NOTE — TELEPHONE ENCOUNTER
LM for patient to call back. Patient is due for a diabetes follow up with PCP.    Betty Fox

## 2019-04-18 DIAGNOSIS — I10 HYPERTENSION GOAL BP (BLOOD PRESSURE) < 140/90: ICD-10-CM

## 2019-04-18 RX ORDER — HYDROCHLOROTHIAZIDE 25 MG/1
TABLET ORAL
Qty: 30 TABLET | Refills: 0 | Status: SHIPPED | OUTPATIENT
Start: 2019-04-18 | End: 2019-04-18

## 2019-04-18 NOTE — TELEPHONE ENCOUNTER
"Requested Prescriptions   Pending Prescriptions Disp Refills     hydrochlorothiazide (HYDRODIURIL) 25 MG tablet [Pharmacy Med Name: HYDROCHLOROTHIAZIDE 25MG TABLETS]  Last Written Prescription Date:  4/18/18  Last Fill Quantity: 90 tablet,  # refills: 3   Last office visit: 4/18/2018 with prescribing provider:  Diane Diaz     Future Office Visit:   Next 5 appointments (look out 90 days)    Apr 23, 2019  8:00 AM CDT  PHYSICAL with DANGELO Verduzco CNP  [facility] ([facility] [address] [address]  [phone]          90 tablet 0     Sig: TAKE 1 TABLET(25 MG) BY MOUTH DAILY       Diuretics (Including Combos) Protocol Failed - 4/18/2019  4:09 AM        Failed - Blood pressure under 140/90 in past 12 months     BP Readings from Last 3 Encounters:   02/11/19 146/88   06/06/18 154/87   06/04/18 136/78                 Failed - Normal serum creatinine on file in past 12 months     Recent Labs   Lab Test 12/26/17  1329   CR 0.61              Failed - Normal serum potassium on file in past 12 months     Recent Labs   Lab Test 12/26/17  1329   POTASSIUM 3.6                    Failed - Normal serum sodium on file in past 12 months     Recent Labs   Lab Test 12/26/17  1329                 Passed - Recent (12 mo) or future (30 days) visit within the authorizing provider's specialty     Patient had office visit in the last 12 months or has a visit in the next 30 days with authorizing provider or within the authorizing provider's specialty.  See \"Patient Info\" tab in inbasket, or \"Choose Columns\" in Meds & Orders section of the refill encounter.              Passed - Medication is active on med list        Passed - Patient is age 18 or older        Passed - No active pregancy on record        Passed - No positive pregnancy test in past 12 months          "

## 2019-04-19 NOTE — TELEPHONE ENCOUNTER
BK to return clinic phone call. Patient is due for annual physical.  Patient informed that a medication refill was sent to their pharmacy.           Roberta RUSSELL     [facility]

## 2019-04-19 NOTE — TELEPHONE ENCOUNTER
Medication is being filled for 1 time refill only due to:  Patient needs to be seen because it has been more than one year since last visit.     Signed Prescriptions:                        Disp   Refills    hydrochlorothiazide (HYDRODIURIL) 25 MG ta*30 tab*0        Sig: TAKE 1 TABLET(25 MG) BY MOUTH DAILY  Authorizing Provider: SHIRAZ LOMELI  Ordering User: SHOLA WASHINGTON      Routing to  reception - one month refill due for annual office visit please    Thank you,  Shola Washington RN

## 2019-04-19 NOTE — TELEPHONE ENCOUNTER
"Requested Prescriptions   Pending Prescriptions Disp Refills     hydrochlorothiazide (HYDRODIURIL) 25 MG tablet [Pharmacy Med Name: HYDROCHLOROTHIAZIDE 25MG TABLETS]  Last Written Prescription Date:  4/18/2019  Last Fill Quantity: 30 tab,  # refills: 0   Last Office Visit: 2/11/2019 viviana    Future Office Visit:    Next 5 appointments (look out 90 days)    Apr 23, 2019  8:00 AM CDT  PHYSICAL with DANGELO Verduzco CNP  [facility] ([facility]) [address] [id number] [phone]          90 tablet 0     Sig: TAKE 1 TABLET BY MOUTH DAILY       Diuretics (Including Combos) Protocol Failed - 4/19/2019  8:00 AM        Failed - Blood pressure under 140/90 in past 12 months     BP Readings from Last 3 Encounters:   02/11/19 146/88   06/06/18 154/87   06/04/18 136/78                 Failed - Normal serum creatinine on file in past 12 months     Recent Labs   Lab Test 12/26/17  1329   CR 0.61              Failed - Normal serum potassium on file in past 12 months     Recent Labs   Lab Test 12/26/17  1329   POTASSIUM 3.6                    Failed - Normal serum sodium on file in past 12 months     Recent Labs   Lab Test 12/26/17  1329                 Passed - Recent (12 mo) or future (30 days) visit within the authorizing provider's specialty     Patient had office visit in the last 12 months or has a visit in the next 30 days with authorizing provider or within the authorizing provider's specialty.  See \"Patient Info\" tab in inbasket, or \"Choose Columns\" in Meds & Orders section of the refill encounter.              Passed - Medication is active on med list        Passed - Patient is age 18 or older        Passed - No active pregancy on record        Passed - No positive pregnancy test in past 12 months        "

## 2019-04-21 RX ORDER — HYDROCHLOROTHIAZIDE 25 MG/1
TABLET ORAL
Qty: 30 TABLET | Refills: 0 | Status: SHIPPED | OUTPATIENT
Start: 2019-04-21 | End: 2019-04-23

## 2019-04-22 NOTE — TELEPHONE ENCOUNTER
Medication is being filled for 1 time refill only due to:  Patient needs to be seen because it has been more than one year since last visit.   Pt has upcoming appointment.  Debra De La Cruz RN

## 2019-04-23 ENCOUNTER — OFFICE VISIT (OUTPATIENT)
Dept: FAMILY MEDICINE | Facility: CLINIC | Age: 59
End: 2019-04-23
Payer: COMMERCIAL

## 2019-04-23 VITALS
HEART RATE: 79 BPM | RESPIRATION RATE: 16 BRPM | OXYGEN SATURATION: 96 % | SYSTOLIC BLOOD PRESSURE: 123 MMHG | DIASTOLIC BLOOD PRESSURE: 76 MMHG | TEMPERATURE: 97.2 F | WEIGHT: 176 LBS | BODY MASS INDEX: 30.05 KG/M2 | HEIGHT: 64 IN

## 2019-04-23 DIAGNOSIS — I10 HYPERTENSION GOAL BP (BLOOD PRESSURE) < 140/90: ICD-10-CM

## 2019-04-23 DIAGNOSIS — Z00.00 ROUTINE GENERAL MEDICAL EXAMINATION AT A HEALTH CARE FACILITY: Primary | ICD-10-CM

## 2019-04-23 DIAGNOSIS — R09.81 NASAL CONGESTION: ICD-10-CM

## 2019-04-23 DIAGNOSIS — E11.65 TYPE 2 DIABETES MELLITUS WITH HYPERGLYCEMIA, WITHOUT LONG-TERM CURRENT USE OF INSULIN (H): ICD-10-CM

## 2019-04-23 DIAGNOSIS — Z12.4 SCREENING FOR CERVICAL CANCER: ICD-10-CM

## 2019-04-23 DIAGNOSIS — F43.23 ADJUSTMENT DISORDER WITH MIXED ANXIETY AND DEPRESSED MOOD: ICD-10-CM

## 2019-04-23 DIAGNOSIS — E78.5 HYPERLIPIDEMIA WITH TARGET LDL LESS THAN 130: ICD-10-CM

## 2019-04-23 LAB
ALBUMIN SERPL-MCNC: 4.1 G/DL (ref 3.4–5)
ALP SERPL-CCNC: 90 U/L (ref 40–150)
ALT SERPL W P-5'-P-CCNC: 55 U/L (ref 0–50)
ANION GAP SERPL CALCULATED.3IONS-SCNC: 7 MMOL/L (ref 3–14)
AST SERPL W P-5'-P-CCNC: 21 U/L (ref 0–45)
BILIRUB SERPL-MCNC: 0.5 MG/DL (ref 0.2–1.3)
BUN SERPL-MCNC: 16 MG/DL (ref 7–30)
CALCIUM SERPL-MCNC: 9.4 MG/DL (ref 8.5–10.1)
CHLORIDE SERPL-SCNC: 103 MMOL/L (ref 94–109)
CHOLEST SERPL-MCNC: 201 MG/DL
CO2 SERPL-SCNC: 28 MMOL/L (ref 20–32)
CREAT SERPL-MCNC: 0.75 MG/DL (ref 0.52–1.04)
CREAT UR-MCNC: 196 MG/DL
GFR SERPL CREATININE-BSD FRML MDRD: 88 ML/MIN/{1.73_M2}
GLUCOSE SERPL-MCNC: 194 MG/DL (ref 70–99)
HBA1C MFR BLD: 7.8 % (ref 0–5.6)
HDLC SERPL-MCNC: 43 MG/DL
LDLC SERPL CALC-MCNC: 118 MG/DL
MICROALBUMIN UR-MCNC: 43 MG/L
MICROALBUMIN/CREAT UR: 21.94 MG/G CR (ref 0–25)
NONHDLC SERPL-MCNC: 158 MG/DL
POTASSIUM SERPL-SCNC: 3.6 MMOL/L (ref 3.4–5.3)
PROT SERPL-MCNC: 7.7 G/DL (ref 6.8–8.8)
SODIUM SERPL-SCNC: 138 MMOL/L (ref 133–144)
TRIGL SERPL-MCNC: 202 MG/DL

## 2019-04-23 PROCEDURE — 99396 PREV VISIT EST AGE 40-64: CPT | Mod: 25 | Performed by: NURSE PRACTITIONER

## 2019-04-23 PROCEDURE — 87624 HPV HI-RISK TYP POOLED RSLT: CPT | Performed by: NURSE PRACTITIONER

## 2019-04-23 PROCEDURE — 80053 COMPREHEN METABOLIC PANEL: CPT | Performed by: NURSE PRACTITIONER

## 2019-04-23 PROCEDURE — G0145 SCR C/V CYTO,THINLAYER,RESCR: HCPCS | Performed by: NURSE PRACTITIONER

## 2019-04-23 PROCEDURE — 36415 COLL VENOUS BLD VENIPUNCTURE: CPT | Performed by: NURSE PRACTITIONER

## 2019-04-23 PROCEDURE — 82043 UR ALBUMIN QUANTITATIVE: CPT | Performed by: NURSE PRACTITIONER

## 2019-04-23 PROCEDURE — 90750 HZV VACC RECOMBINANT IM: CPT | Performed by: NURSE PRACTITIONER

## 2019-04-23 PROCEDURE — 99214 OFFICE O/P EST MOD 30 MIN: CPT | Mod: 25 | Performed by: NURSE PRACTITIONER

## 2019-04-23 PROCEDURE — 90471 IMMUNIZATION ADMIN: CPT | Performed by: NURSE PRACTITIONER

## 2019-04-23 PROCEDURE — 80061 LIPID PANEL: CPT | Performed by: NURSE PRACTITIONER

## 2019-04-23 PROCEDURE — 83036 HEMOGLOBIN GLYCOSYLATED A1C: CPT | Performed by: NURSE PRACTITIONER

## 2019-04-23 RX ORDER — HYDROCHLOROTHIAZIDE 25 MG/1
25 TABLET ORAL DAILY
Qty: 90 TABLET | Refills: 3 | Status: SHIPPED | OUTPATIENT
Start: 2019-04-23 | End: 2020-03-31

## 2019-04-23 RX ORDER — FLUTICASONE PROPIONATE 50 MCG
2 SPRAY, SUSPENSION (ML) NASAL DAILY
Qty: 48 ML | Refills: 3 | Status: SHIPPED | OUTPATIENT
Start: 2019-04-23 | End: 2021-01-28

## 2019-04-23 RX ORDER — METFORMIN HCL 500 MG
1000 TABLET, EXTENDED RELEASE 24 HR ORAL 2 TIMES DAILY WITH MEALS
Qty: 360 TABLET | Refills: 3 | Status: SHIPPED | OUTPATIENT
Start: 2019-04-23 | End: 2020-03-31

## 2019-04-23 RX ORDER — SIMVASTATIN 20 MG
20 TABLET ORAL AT BEDTIME
Qty: 90 TABLET | Refills: 3 | Status: SHIPPED | OUTPATIENT
Start: 2019-04-23 | End: 2020-03-31

## 2019-04-23 RX ORDER — VENLAFAXINE HYDROCHLORIDE 37.5 MG/1
112.5 CAPSULE, EXTENDED RELEASE ORAL DAILY
Qty: 270 CAPSULE | Refills: 3 | Status: SHIPPED | OUTPATIENT
Start: 2019-04-23 | End: 2020-03-31

## 2019-04-23 ASSESSMENT — ENCOUNTER SYMPTOMS
EYE PAIN: 0
ABDOMINAL PAIN: 0
HEMATURIA: 0
SHORTNESS OF BREATH: 0
DIZZINESS: 0
DYSURIA: 0
HEADACHES: 0
NAUSEA: 0
BREAST MASS: 0
FREQUENCY: 0
HEMATOCHEZIA: 0
NERVOUS/ANXIOUS: 0
SORE THROAT: 0
PALPITATIONS: 0
JOINT SWELLING: 0
HEARTBURN: 0
PARESTHESIAS: 0
COUGH: 0
DIARRHEA: 0
ARTHRALGIAS: 0
CONSTIPATION: 0
FEVER: 0
WEAKNESS: 0
MYALGIAS: 0
CHILLS: 0

## 2019-04-23 ASSESSMENT — MIFFLIN-ST. JEOR: SCORE: 1363.33

## 2019-04-23 NOTE — NURSING NOTE
Screening Questionnaire for Adult Immunization    Are you sick today?   No   Do you have allergies to medications, food, a vaccine component or latex?   No   Have you ever had a serious reaction after receiving a vaccination?   No   Do you have a long-term health problem with heart disease, lung disease, asthma, kidney disease, metabolic disease (e.g. diabetes), anemia, or other blood disorder?   Yes   Do you have cancer, leukemia, HIV/AIDS, or any other immune system problem?   No   In the past 3 months, have you taken medications that affect  your immune system, such as prednisone, other steroids, or anticancer drugs; drugs for the treatment of rheumatoid arthritis, Crohn s disease, or psoriasis; or have you had radiation treatments?   No   Have you had a seizure, or a brain or other nervous system problem?   No   During the past year, have you received a transfusion of blood or blood     products, or been given immune (gamma) globulin or antiviral drug?   No   For women: Are you pregnant or is there a chance you could become        pregnant during the next month?   No   Have you received any vaccinations in the past 4 weeks?   No     Immunization questionnaire was positive for at least one answer.  Notified Diane Diaz.       Per orders of Dr. Diane Diaz, injection of Shingrix given by Merry Sheehan. Patient instructed to remain in clinic for 15 minutes afterwards, and to report any adverse reaction to me immediately.       Screening performed by Merry Sheehan on 4/23/2019 at 9:28 AM.

## 2019-04-23 NOTE — PROGRESS NOTES
SUBJECTIVE:   CC: Inger Wegener is an 58 year old woman who presents for preventive health visit.     Healthy Habits:     Getting at least 3 servings of Calcium per day:  Yes    Bi-annual eye exam:  Yes    Dental care twice a year:  Yes    Sleep apnea or symptoms of sleep apnea:  None    Diet:  Diabetic    Frequency of exercise:  2-3 days/week    Duration of exercise:  15-30 minutes    Taking medications regularly:  No    Barriers to taking medications:  None    Medication side effects:  None    PHQ-2 Total Score: 0    Additional concerns today:  No      Today's PHQ-2 Score:   PHQ-2 ( 1999 [employer]) 4/23/2019   Q1: Little interest or pleasure in doing things 0   Q2: Feeling down, depressed or hopeless 0   PHQ-2 Score 0   Q1: Little interest or pleasure in doing things Not at all   Q2: Feeling down, depressed or hopeless Not at all   PHQ-2 Score 0       Abuse: Current or Past(Physical, Sexual or Emotional)- No  Do you feel safe in your environment? Yes    Patient is here for diabetes lab work.    Social History     Tobacco Use     Smoking status: Passive Smoke Exposure - Never Smoker     Smokeless tobacco: Never Used   Substance Use Topics     Alcohol use: Yes       Alcohol Use 4/23/2019   Prescreen: >3 drinks/day or >7 drinks/week? Yes   Prescreen: >3 drinks/day or >7 drinks/week? -   AUDIT SCORE  4     AUDIT - Alcohol Use Disorders Identification Test - Reproduced from the World Health Organization Audit 2001 (Second Edition) 4/23/2019   1.  How often do you have a drink containing alcohol? 4 or more times a week   2.  How many drinks containing alcohol do you have on a typical day when you are drinking? 1 or 2   3.  How often do you have five or more drinks on one occasion? Never   4.  How often during the last year have you found that you were not able to stop drinking once you had started? Never   5.  How often during the last year have you failed to do what was normally expected of you because of drinking?  Never   6.  How often during the last year have you needed a first drink in the morning to get yourself going after a heavy drinking session? Never   7.  How often during the last year have you had a feeling of guilt or remorse after drinking? Never   8.  How often during the last year have you been unable to remember what happened the night before because of your drinking? Never   9.  Have you or someone else been injured because of your drinking? No   10. Has a relative, friend, doctor or other health care worker been concerned about your drinking or suggested you cut down? No   TOTAL SCORE 4       Reviewed orders with patient.  Reviewed health maintenance and updated orders accordingly - Yes  Labs reviewed in EPIC  BP Readings from Last 3 Encounters:   04/23/19 123/76   02/11/19 146/88   06/06/18 154/87    Wt Readings from Last 3 Encounters:   04/23/19 79.8 kg (176 lb)   02/11/19 81.2 kg (179 lb)   06/06/18 79.5 kg (175 lb 3.2 oz)                Patient Active Problem List   Diagnosis     CARDIOVASCULAR SCREENING; LDL GOAL LESS THAN 160     Hypertension goal BP (blood pressure) < 140/90     Flying phobia     Hyperlipidemia with target LDL less than 130     Adjustment reaction     Adjustment disorder with mixed anxiety and depressed mood     Closed nondisplaced dome fracture of right talus, sequela     Ankle pain, right     Type 2 diabetes mellitus with hyperglycemia (H)     Nasal congestion     Pain in joint, ankle and foot, right     Aftercare for healing traumatic fracture of lower leg, unspecified laterality, closed     History reviewed. No pertinent surgical history.    Social History     Tobacco Use     Smoking status: Passive Smoke Exposure - Never Smoker     Smokeless tobacco: Never Used   Substance Use Topics     Alcohol use: Yes     Family History   Problem Relation Age of Onset     Eye Disorder Mother      Hypertension Mother      Thyroid Disease Mother      Hearing Loss Father      Diabetes Maternal  Grandmother          Current Outpatient Medications   Medication Sig Dispense Refill     ASPIRIN NOT PRESCRIBED (INTENTIONAL) continuous prn for other Reported on 3/8/2017       blood glucose (ACCU-CHEK FASTCLIX) lancing device Device to be used with lancets. 1 each 0     blood glucose monitoring (ACCU-CHEK FASTCLIX) lancets Use to test blood sugar 2 times daily or as directed.  Ok to substitute alternative if insurance prefers. 300 each 3     blood glucose monitoring (ACCU-CHEK SMARTVIEW) test strip Use to test blood sugar 2 times daily or as directed.  Ok to substitute alternative if insurance prefers. 180 each 3     Calcium Carbonate-Vitamin D3 (CALCIUM 600+D3) 600-400 MG-UNIT TABS Take 1 tablet by mouth daily       Cholecalciferol (VITAMIN D3) 2000 units CAPS Take 1 capsule by mouth daily       cyanocolbalamin (VITAMIN  B-12) 500 MCG tablet Take 500 mcg by mouth daily       fluticasone (FLONASE) 50 MCG/ACT spray Spray 2 sprays into both nostrils daily 48 mL 3     hydrochlorothiazide (HYDRODIURIL) 25 MG tablet Take 1 tablet (25 mg) by mouth daily 90 tablet 3     metFORMIN (GLUCOPHAGE-XR) 500 MG 24 hr tablet Take 2 tablets (1,000 mg) by mouth 2 times daily (with meals) 360 tablet 3     order for DME Equipment being ordered: Compression stocking, medium compression 2 Units 11     simvastatin (ZOCOR) 20 MG tablet Take 1 tablet (20 mg) by mouth At Bedtime 90 tablet 3     venlafaxine (EFFEXOR-XR) 37.5 MG 24 hr capsule Take 3 capsules (112.5 mg) by mouth daily 270 capsule 3     Allergies   Allergen Reactions     Erythromycin Nausea     Recent Labs   Lab Test 06/06/18  1729 12/26/17  1329 06/20/17  0948 12/29/16  1044  09/29/15  0748   A1C 7.0* 6.9* 6.7* 6.6*   < > 6.7*   LDL  --  85 114* 91  --  107   HDL  --  48* 48* 47*  --  49*   TRIG  --  302* 158* 150*  --  219*   ALT  --  30  --  38  --  44   CR  --  0.61  --  0.73  --  0.80   GFRESTIMATED  --  >90  --  82  --  75   GFRESTBLACK  --  >90  --  >90    GFR Calc    --  >90   GFR Calc     POTASSIUM  --  3.6  --  3.4  --  3.4   TSH 3.44 4.94* 2.97  --    < >  --     < > = values in this interval not displayed.        Mammogram Screening: Patient over age 50, mutual decision to screen reflected in health maintenance.    Pertinent mammograms are reviewed under the imaging tab.  History of abnormal Pap smear:   Last 3 Pap and HPV Results:   PAP / HPV Latest Ref Rng & Units 12/29/2016 11/13/2013   PAP - NIL NIL   HPV 16 DNA NEG Negative -   HPV 18 DNA NEG Negative -   OTHER HR HPV NEG Negative -     PAP / HPV Latest Ref Rng & Units 12/29/2016 11/13/2013   PAP - NIL NIL   HPV 16 DNA NEG Negative -   HPV 18 DNA NEG Negative -   OTHER HR HPV NEG Negative -     Reviewed and updated as needed this visit by clinical staff  Tobacco  Allergies  Meds  Med Hx  Surg Hx  Fam Hx  Soc Hx        Reviewed and updated as needed this visit by Provider        Diabetes:  Going well.  Taking her meds as prescribed.  Blood sugars ranging 70's-200's    Effexor:  For mood.  Going well.  No side effects.  Requesting refills today.       Review of Systems   Constitutional: Negative for chills and fever.   HENT: Negative for congestion, ear pain, hearing loss and sore throat.    Eyes: Negative for pain and visual disturbance.   Respiratory: Negative for cough and shortness of breath.    Cardiovascular: Negative for chest pain, palpitations and peripheral edema.   Gastrointestinal: Negative for abdominal pain, constipation, diarrhea, heartburn, hematochezia and nausea.   Breasts:  Negative for tenderness, breast mass and discharge.   Genitourinary: Negative for dysuria, frequency, genital sores, hematuria, pelvic pain, urgency, vaginal bleeding and vaginal discharge.   Musculoskeletal: Negative for arthralgias, joint swelling and myalgias.   Skin: Negative for rash.   Neurological: Negative for dizziness, weakness, headaches and paresthesias.   Psychiatric/Behavioral: Negative  "for mood changes. The patient is not nervous/anxious.         OBJECTIVE:   /76 (BP Location: Right arm, Patient Position: Sitting, Cuff Size: Adult Large)   Pulse 79   Temp 97.2  F (36.2  C) (Oral)   Resp 16   Ht 1.626 m (5' 4\")   Wt 79.8 kg (176 lb)   SpO2 96%   BMI 30.21 kg/m    Physical Exam  GENERAL APPEARANCE: healthy, alert and no distress  EYES: Eyes grossly normal to inspection, PERRL and conjunctivae and sclerae normal  HENT: ear canals and TM's normal, nose and mouth without ulcers or lesions, oropharynx clear and oral mucous membranes moist  NECK: no adenopathy, no asymmetry, masses, or scars and thyroid normal to palpation  RESP: lungs clear to auscultation - no rales, rhonchi or wheezes  BREAST: normal without masses, tenderness or nipple discharge and no palpable axillary masses or adenopathy  CV: regular rate and rhythm, normal S1 S2, no S3 or S4, no murmur, click or rub, no peripheral edema and peripheral pulses strong  ABDOMEN: soft, nontender, no hepatosplenomegaly, no masses and bowel sounds normal   (female): normal female external genitalia, normal urethral meatus, vaginal mucosal atrophy noted, normal cervix, adnexae, and uterus without masses or abnormal discharge  MS: no musculoskeletal defects are noted and gait is age appropriate without ataxia  SKIN: no suspicious lesions or rashes  NEURO: Normal strength and tone, sensory exam grossly normal, mentation intact and speech normal  PSYCH: mentation appears normal and affect normal/bright    Diagnostic Test Results:  Results for orders placed or performed in visit on 04/23/19   HEMOGLOBIN A1C   Result Value Ref Range    Hemoglobin A1C 7.8 (H) 0 - 5.6 %     Other results pendin    ASSESSMENT/PLAN:   (Z00.00) Routine general medical examination at a health care facility  (primary encounter diagnosis)  Comment:   Plan: HEMOGLOBIN A1C, C FOOT EXAM  NO CHARGE, EYE         EXAM (SIMPLE-NONBILLABLE) [9999.001], Albumin         Random " "Urine Quantitative with Creat Ratio,         Comprehensive metabolic panel, Lipid panel         reflex to direct LDL Fasting, Pap imaged thin         layer screen with HPV - recommended age 30 - 65        years (select HPV order below), HPV High Risk         Types DNA Cervical            (E11.65) Type 2 diabetes mellitus with hyperglycemia, without long-term current use of insulin (H)  Comment: worse  Plan: HEMOGLOBIN A1C, C FOOT EXAM  NO CHARGE, EYE         EXAM (SIMPLE-NONBILLABLE) [9999.001],         Comprehensive metabolic panel, Lipid panel         reflex to direct LDL Fasting, metFORMIN         (GLUCOPHAGE-XR) 500 MG 24 hr tablet,         OFFICE/OUTPT VISIT,EST,LEVL IV        Per the patient, she has been slipping on her diabetes management, diet, exercise, etc.  For now, I did not do a med adjustment or add meds.  She will \"follow the rules\" with eating a healthy diet, increasing her exercise, etc.  She is to return to the clinic in 3-6 months for a recheck, sooner if problems or concerns.    (I10) Hypertension goal BP (blood pressure) < 140/90  Comment: controlled  Plan: Albumin Random Urine Quantitative with Creat         Ratio, Lipid panel reflex to direct LDL         Fasting, hydrochlorothiazide (HYDRODIURIL) 25         MG tablet, OFFICE/OUTPT VISIT,EST,LEVL IV        Refills given.    (E78.5) Hyperlipidemia with target LDL less than 130  Comment:   Plan: Comprehensive metabolic panel, Lipid panel         reflex to direct LDL Fasting, simvastatin         (ZOCOR) 20 MG tablet, OFFICE/OUTPT         VISIT,EST,LEVL IV        Results pending.  Healthy diet and aerobic activity encouraged.    (F43.23) Adjustment disorder with mixed anxiety and depressed mood  Comment:   Plan: venlafaxine (EFFEXOR-XR) 37.5 MG 24 hr capsule,        OFFICE/OUTPT VISIT,EST,LEVL IV        She will cut down on her effexor as she is doing much better emotionally.  She will cut down to 75mg/day for 1 month and then 37.5mg/day for 1 " "month and then she will stop the medication if she continues to do well.      (R09.81) Nasal congestion  Comment:   Plan: fluticasone (FLONASE) 50 MCG/ACT nasal spray,         OFFICE/OUTPT VISIT,EST,LEVL IV        Refills given.  She can add zyrtec or claritin daily as well.  Follow up yearly, soonr prn.    (Z12.4) Screening for cervical cancer  Comment:   Plan: Pap imaged thin layer screen with HPV -         recommended age 30 - 65 years (select HPV order        below), HPV High Risk Types DNA Cervical        Done today.      COUNSELING:  Reviewed preventive health counseling, as reflected in patient instructions    BP Readings from Last 1 Encounters:   04/23/19 123/76     Estimated body mass index is 30.21 kg/m  as calculated from the following:    Height as of this encounter: 1.626 m (5' 4\").    Weight as of this encounter: 79.8 kg (176 lb).      Weight management plan: Discussed healthy diet and exercise guidelines     reports that she is a non-smoker but has been exposed to tobacco smoke. She has never used smokeless tobacco.      Counseling Resources:  ATP IV Guidelines  Pooled Cohorts Equation Calculator  Breast Cancer Risk Calculator  FRAX Risk Assessment  ICSI Preventive Guidelines  Dietary Guidelines for Americans, 2010  USDA's MyPlate  ASA Prophylaxis  Lung CA Screening    DANGELO Lockwood [facility]  "

## 2019-04-26 LAB
COPATH REPORT: NORMAL
PAP: NORMAL

## 2019-04-30 LAB
FINAL DIAGNOSIS: NORMAL
HPV HR 12 DNA CVX QL NAA+PROBE: NEGATIVE
HPV16 DNA SPEC QL NAA+PROBE: NEGATIVE
HPV18 DNA SPEC QL NAA+PROBE: NEGATIVE
SPECIMEN DESCRIPTION: NORMAL
SPECIMEN SOURCE CVX/VAG CYTO: NORMAL

## 2019-06-10 NOTE — PROGRESS NOTES
Patient did not return for further treatment and no additional progress was noted.  Please refer to the progress note and goal flowsheet completed on   for discharge information.

## 2019-06-11 PROBLEM — S82.90XD AFTERCARE FOR HEALING TRAUMATIC FRACTURE OF LOWER LEG, UNSPECIFIED LATERALITY, CLOSED: Status: RESOLVED | Noted: 2018-05-23 | Resolved: 2019-06-11

## 2019-06-11 PROBLEM — M25.571 PAIN IN JOINT, ANKLE AND FOOT, RIGHT: Status: RESOLVED | Noted: 2018-05-23 | Resolved: 2019-06-11

## 2019-07-01 DIAGNOSIS — E11.65 TYPE 2 DIABETES MELLITUS WITH HYPERGLYCEMIA, WITHOUT LONG-TERM CURRENT USE OF INSULIN (H): ICD-10-CM

## 2019-07-01 RX ORDER — METFORMIN HCL 500 MG
TABLET, EXTENDED RELEASE 24 HR ORAL
Qty: 360 TABLET | Refills: 0 | OUTPATIENT
Start: 2019-07-01

## 2019-07-01 NOTE — TELEPHONE ENCOUNTER
Patient metformin filled for 1 year on 4/23/2019    No refill needed--patient to contact pharmacy--[id number] - [place], [place] - [address] AT [facility] [address]    Thanks! Faina Ortiz RN

## 2019-07-01 NOTE — MR AVS SNAPSHOT
After Visit Summary   5/22/2018    Inger Wegener    MRN: [id number]           Patient Information     Date Of Birth          1960        Visit Information        Provider Department      5/22/2018 4:40 PM Natalia Garcia, ZACKARY [facility] Physical Therapy        Today's Diagnoses     Pain in joint, ankle and foot, right    -  1    Aftercare for healing traumatic fracture of lower leg, unspecified laterality, closed           Follow-ups after your visit        Your next 10 appointments already scheduled     May 29, 2018  4:40 PM CDT   DEEPTI Extremity with Natalia Garcia PT   [facility] Physical Therapy ([facility]  )    [address] [place] [address]   [phone]            Jun 04, 2018  7:15 AM CDT   Return Visit with Yeyo Davis DPM   [facility] ([facility])    [address] [place] [address]   [phone]            Jun 06, 2018  4:30 PM CDT   SHORT with Yash Ornelas [facility] ([facility])    [address] [place] [address]   [phone]            Jun 26, 2018  2:00 PM CDT   DEEPTI Extremity with Natalia Garcia PT   [facility] ([facility]  )    [address] [place] [address]   [phone]            Jul 03, 2018  2:00 PM CDT   DEEPTI Extremity with Natalia Garcia PT   [facility] ([facility]  )    [address] [place] [address]   [phone]            Jul 10, 2018  2:00 PM CDT   DEEPTI Extremity with Natalia Garcia PT   [facility] ([facility]  )    [address] [place] [address]   [phone]              Who to contact     If you have questions or need follow up  information about today's clinic visit or your schedule please contact [facility] PHYSICAL THERAPY directly at [phone].  Normal or non-critical lab and imaging results will be communicated to you by MyChart, letter or phone within 4 business days after the clinic has received the results. If you do not hear from us within 7 days, please contact the clinic through [facility] or phone. If you have a critical or abnormal lab result, we will notify you by phone as soon as possible.  Submit refill requests through [facility] or call your pharmacy and they will forward the refill request to us. Please allow 3 business days for your refill to be completed.          Additional Information About Your Visit        [facility] Medical Information     [facility] gives you secure access to your electronic health record. If you see a primary care provider, you can also send messages to your care team and make appointments. If you have questions, please call your primary care clinic.  If you do not have a primary care provider, please call [phone] and they will assist you.        Care EveryWhere ID     This is your Care EveryWhere ID. This could be used by other organizations to access your [place] medical records  [id number]         Blood Pressure from Last 3 Encounters:   05/07/18 126/88   04/18/18 135/83   04/09/18 130/72    Weight from Last 3 Encounters:   05/07/18 78.5 kg (173 lb)   04/18/18 78.9 kg (174 lb)   04/09/18 78.9 kg (174 lb)              We Performed the Following     DEEPTI Inital Eval Report     Manual Ther Tech, 1+Regions, EA 15 min     PT Eval, Moderate Complexity (34979)     Therapeutic Exercises        Primary Care Provider Office Phone # Fax #    DANGELO Verduzco [phone] [address] [place] [address]        Equal Access to Services     MARIBEL HOOD : Sushila Valle, jeannie miller, solo king,  fernanda simonperla lai'aan ah. So [facility] [phone].    ATENCIÓN: Si habla choco, tiene a bullard disposición servicios gratuitos de asistencia lingüística. Rodney al [phone].    We comply with applicable federal civil rights laws and [place] laws. We do not discriminate on the basis of race, color, national origin, age, disability, sex, sexual orientation, or gender identity.            Thank you!     Thank you for choosing [facility] PHYSICAL THERAP[facility]  for your care. Our goal is always to provide you with excellent care. Hearing back from our patients is one way we can continue to improve our services. Please take a few minutes to complete the written survey that you may receive in the mail after your visit with us. Thank you!             Your Updated Medication List - Protect others around you: Learn how to safely use, store and throw away your medicines at www.disposemymeds.org.          This list is accurate as of 5/22/18 11:59 PM.  Always use your most recent med list.                   Brand Name Dispense Instructions for use Diagnosis    ADVIL PO      Take 400 mg by mouth as needed        ASPIRIN NOT PRESCRIBED    INTENTIONAL     continuous prn for other Reported on 3/8/2017        blood glucose lancing device     1 each    Device to be used with lancets.    Type 2 diabetes mellitus with hyperglycemia, without long-term current use of insulin (H)       blood glucose monitoring lancets     300 each    Use to test blood sugar 2 times daily or as directed.  Ok to substitute alternative if insurance prefers.    Type 2 diabetes mellitus with hyperglycemia, without long-term current use of insulin (H)       blood glucose monitoring test strip    ACCU-CHEK SMARTVIEW    180 each    Use to test blood sugar 2 times daily or as directed.  Ok to substitute alternative if insurance prefers.    Type 2 diabetes mellitus with hyperglycemia, without long-term current use of  insulin (H)       fluticasone 50 MCG/ACT spray    FLONASE    48 mL    Spray 2 sprays into both nostrils daily    Nasal congestion       hydrochlorothiazide 25 MG tablet    HYDRODIURIL    90 tablet    Take 1 tablet (25 mg) by mouth daily    Hypertension goal BP (blood pressure) < 140/90       metFORMIN 500 MG 24 hr tablet    GLUCOPHAGE-XR    360 tablet    Take 2 tablets (1,000 mg) by mouth 2 times daily (with meals)    Type 2 diabetes mellitus with hyperglycemia, without long-term current use of insulin (H)       naproxen 500 MG tablet    NAPROSYN    60 tablet    Take 1 tablet (500 mg) by mouth 2 times daily (with meals)    Foot injury, right, initial encounter       order for DME     2 Units    Equipment being ordered: Compression stocking, medium compression    Type 2 diabetes mellitus with hyperglycemia, without long-term current use of insulin (H)       order for DME     1 Device    Knee Walker/rollabout Length of use: Three months    Closed fracture of right foot, initial encounter       order for DME     1 Device    Medium surgical shoe    Closed fracture of right foot, initial encounter       simvastatin 20 MG tablet    ZOCOR    90 tablet    Take 1 tablet (20 mg) by mouth At Bedtime    Hyperlipidemia with target LDL less than 130       venlafaxine 37.5 MG 24 hr capsule    EFFEXOR-XR    90 capsule    Take 1 capsule (37.5 mg) by mouth 3 times daily    Adjustment disorder with mixed anxiety and depressed mood          <<----- Click to add NO significant Past Surgical History

## 2019-07-19 ENCOUNTER — ANCILLARY PROCEDURE (OUTPATIENT)
Dept: MAMMOGRAPHY | Facility: CLINIC | Age: 59
End: 2019-07-19
Attending: NURSE PRACTITIONER
Payer: COMMERCIAL

## 2019-07-19 DIAGNOSIS — Z12.31 VISIT FOR SCREENING MAMMOGRAM: ICD-10-CM

## 2019-07-19 PROCEDURE — 77067 SCR MAMMO BI INCL CAD: CPT | Mod: TC

## 2019-08-14 ENCOUNTER — TRANSFERRED RECORDS (OUTPATIENT)
Dept: HEALTH INFORMATION MANAGEMENT | Facility: CLINIC | Age: 59
End: 2019-08-14

## 2019-08-25 NOTE — PATIENT INSTRUCTIONS
Wear boot, crutch walk  Naprosyn 2 x day with food  Elevate  Ice    See Podiatry for foot fracture care    Vitamin D and calcium for bone healing.    Call or return to clinic if symptoms worsen or fail to improve as anticipated.        Foot Fracture  You have a broken bone (fracture) in your foot. This will cause pain, swelling, and often bruising. It will usually take about 4 to 8 weeks to heal. A foot fracture may be treated with a special shoe, splint, cast, or boot.  Home care  Follow these guidelines when caring for yourself at home:    You may be given a splint, cast, shoe, or boot to keep the injured area from moving. Unless you were told otherwise, use crutches or a walker. Don t put weight on the injured foot until your health care provider says you can do so. (You can rent crutches and a walker at many pharmacies and surgical or orthopedic supply stores.) Don t put weight on a splint, or it will break.    Keep your leg elevated to reduce pain and swelling. When sleeping, put a pillow under the injured leg. When sitting, support the injured leg so it is above your waist. This is very important during the first 2 days (48 hours).    Put an ice pack on the injured area. Do this for 20 minutes every 1 to 2 hours the first day for pain relief. You can make an ice pack by wrapping a plastic bag of ice cubes in a thin towel. As the ice melts, be careful that the splint, cast, boot, or shoe doesn t get wet. You can place the ice pack directly over the splint or cast. Unless told otherwise, you can open the boot or shoe to apply the ice pack. Continue using the ice pack 3 to 4 times a day for the next 2 days. Then use the ice pack as needed to ease pain and swelling.    Keep the splint, cast, boot, or shoe dry. When bathing, protect it with a large plastic bag, rubber-banded at the top end. If a fiberglass splint or cast or boot gets wet, you can dry it with a hair dryer. Unless told otherwise, you can take off the  boot or shoe to bathe.    You may use acetaminophen or ibuprofen to control pain, unless another pain medicine was prescribed. If you have chronic liver or kidney disease, talk with your healthcare provider before using these medicines. Also talk with your provider if you ve had a stomach ulcer or gastrointestinal bleeding.    Don t put creams or objects under the cast if you have itching.  Follow-up care  Follow up with your healthcare provider, or as advised. This is to make sure the bone is healing the way it should. If you were given a splint, it may be changed to a cast or boot at your follow-up visit.  X-rays may be taken. You will be told of any new findings that may affect your care.  When to seek medical advice  Call your healthcare provider right away if any of these occur:    The cast or splint cracks    The plaster cast or splint becomes wet or soft    The fiberglass cast or splint stays wet for more than 24 hours    Bad odor from the cast or wound fluid stains the cast    Tightness or pain under the cast or splint gets worse    Toes become swollen, cold, blue, numb, or tingly    You can t move your toes    Skin around cast or splint becomes red    Fever of 100.4 F (38 C) or higher, or as directed by your healthcare provider  Date Last Reviewed: 2/1/2017 2000-2017 [employer]. [address] [place] [address], [place] [address]. All rights reserved. This information is not intended as a substitute for professional medical care. Always follow your healthcare professional's instructions.         Home

## 2020-01-31 ENCOUNTER — OFFICE VISIT (OUTPATIENT)
Dept: URGENT CARE | Facility: URGENT CARE | Age: 60
End: 2020-01-31
Payer: COMMERCIAL

## 2020-01-31 VITALS
TEMPERATURE: 98.1 F | BODY MASS INDEX: 30.73 KG/M2 | HEIGHT: 64 IN | DIASTOLIC BLOOD PRESSURE: 80 MMHG | RESPIRATION RATE: 13 BRPM | HEART RATE: 80 BPM | WEIGHT: 180 LBS | SYSTOLIC BLOOD PRESSURE: 138 MMHG

## 2020-01-31 DIAGNOSIS — R30.0 DYSURIA: Primary | ICD-10-CM

## 2020-01-31 DIAGNOSIS — N30.01 ACUTE CYSTITIS WITH HEMATURIA: ICD-10-CM

## 2020-01-31 DIAGNOSIS — R82.90 NONSPECIFIC FINDING ON EXAMINATION OF URINE: ICD-10-CM

## 2020-01-31 LAB
ALBUMIN UR-MCNC: NEGATIVE MG/DL
APPEARANCE UR: ABNORMAL
BACTERIA #/AREA URNS HPF: ABNORMAL /HPF
BILIRUB UR QL STRIP: NEGATIVE
COLOR UR AUTO: YELLOW
GLUCOSE UR STRIP-MCNC: 250 MG/DL
HGB UR QL STRIP: ABNORMAL
KETONES UR STRIP-MCNC: NEGATIVE MG/DL
LEUKOCYTE ESTERASE UR QL STRIP: ABNORMAL
NITRATE UR QL: POSITIVE
PH UR STRIP: 6 PH (ref 5–7)
RBC #/AREA URNS AUTO: ABNORMAL /HPF
SOURCE: ABNORMAL
SP GR UR STRIP: >1.03 (ref 1–1.03)
UROBILINOGEN UR STRIP-ACNC: 0.2 EU/DL (ref 0.2–1)
WBC #/AREA URNS AUTO: ABNORMAL /HPF

## 2020-01-31 PROCEDURE — 87186 SC STD MICRODIL/AGAR DIL: CPT | Performed by: PHYSICIAN ASSISTANT

## 2020-01-31 PROCEDURE — 87088 URINE BACTERIA CULTURE: CPT | Performed by: PHYSICIAN ASSISTANT

## 2020-01-31 PROCEDURE — 81001 URINALYSIS AUTO W/SCOPE: CPT | Performed by: PHYSICIAN ASSISTANT

## 2020-01-31 PROCEDURE — 87086 URINE CULTURE/COLONY COUNT: CPT | Performed by: PHYSICIAN ASSISTANT

## 2020-01-31 PROCEDURE — 99213 OFFICE O/P EST LOW 20 MIN: CPT | Performed by: PHYSICIAN ASSISTANT

## 2020-01-31 RX ORDER — CIPROFLOXACIN 250 MG/1
250 TABLET, FILM COATED ORAL 2 TIMES DAILY
Qty: 6 TABLET | Refills: 0 | Status: SHIPPED | OUTPATIENT
Start: 2020-01-31 | End: 2020-02-03

## 2020-01-31 ASSESSMENT — MIFFLIN-ST. JEOR: SCORE: 1376.47

## 2020-01-31 NOTE — PROGRESS NOTES
SUBJECTIVE:  Ameena is a 59 year old female who presents to urgent care with possible UTI for 2 weeks.  Her symptoms have been off and on for that timeframe.  She did have some back pain when this started but she states it has completely resolved after using ibuprofen and stretching. They report dysuria, frequency and back pain  They deny hematuria, suprapubic pain and pressure, nausea, vomiting, fever and chills. They have a distant history of UTI's.     Chief Complaint   Patient presents with     Urgent Care     UTI     started this morning, urgency, frequency with little output. pain with urination.      ROS: as stated in HPI, otherwise negative    No past medical history on file.   Social History     Socioeconomic History     Marital status: Single     Spouse name: Not on file     Number of children: Not on file     Years of education: Not on file     Highest education level: Not on file   Occupational History     Not on file   Social Needs     Financial resource strain: Not on file     Food insecurity:     Worry: Not on file     Inability: Not on file     Transportation needs:     Medical: Not on file     Non-medical: Not on file   Tobacco Use     Smoking status: Passive Smoke Exposure - Never Smoker     Smokeless tobacco: Never Used   Substance and Sexual Activity     Alcohol use: Yes     Drug use: No     Sexual activity: Yes     Partners: Male   Lifestyle     Physical activity:     Days per week: Not on file     Minutes per session: Not on file     Stress: Not on file   Relationships     Social connections:     Talks on phone: Not on file     Gets together: Not on file     Attends Restorationist service: Not on file     Active member of club or organization: Not on file     Attends meetings of clubs or organizations: Not on file     Relationship status: Not on file     Intimate partner violence:     Fear of current or ex partner: Not on file     Emotionally abused: Not on file     Physically abused: Not on file      "Forced sexual activity: Not on file   Other Topics Concern     Parent/sibling w/ CABG, MI or angioplasty before 65F 55M? No   Social History Narrative     Not on file          Current Outpatient Medications:      fluticasone (FLONASE) 50 MCG/ACT nasal spray, Spray 2 sprays into both nostrils daily, Disp: 48 mL, Rfl: 3     hydrochlorothiazide (HYDRODIURIL) 25 MG tablet, Take 1 tablet (25 mg) by mouth daily, Disp: 90 tablet, Rfl: 3     metFORMIN (GLUCOPHAGE-XR) 500 MG 24 hr tablet, Take 2 tablets (1,000 mg) by mouth 2 times daily (with meals), Disp: 360 tablet, Rfl: 3     simvastatin (ZOCOR) 20 MG tablet, Take 1 tablet (20 mg) by mouth At Bedtime, Disp: 90 tablet, Rfl: 3     venlafaxine (EFFEXOR-XR) 37.5 MG 24 hr capsule, Take 3 capsules (112.5 mg) by mouth daily, Disp: 270 capsule, Rfl: 3     ASPIRIN NOT PRESCRIBED (INTENTIONAL), continuous prn for other Reported on 3/8/2017, Disp: , Rfl:      blood glucose (ACCU-CHEK FASTCLIX) lancing device, Device to be used with lancets., Disp: 1 each, Rfl: 0     blood glucose monitoring (ACCU-CHEK FASTCLIX) lancets, Use to test blood sugar 2 times daily or as directed.  Ok to substitute alternative if insurance prefers., Disp: 300 each, Rfl: 3     blood glucose monitoring (ACCU-CHEK SMARTVIEW) test strip, Use to test blood sugar 2 times daily or as directed.  Ok to substitute alternative if insurance prefers., Disp: 180 each, Rfl: 3     Calcium Carbonate-Vitamin D3 (CALCIUM 600+D3) 600-400 MG-UNIT TABS, Take 1 tablet by mouth daily, Disp: , Rfl:      Cholecalciferol (VITAMIN D3) 2000 units CAPS, Take 1 capsule by mouth daily, Disp: , Rfl:      cyanocolbalamin (VITAMIN  B-12) 500 MCG tablet, Take 500 mcg by mouth daily, Disp: , Rfl:      order for DME, Equipment being ordered: Compression stocking, medium compression, Disp: 2 Units, Rfl: 11     OBJECTIVE:  /80   Pulse 80   Temp 98.1  F (36.7  C) (Oral)   Resp 13   Ht 1.626 m (5' 4\")   Wt 81.6 kg (180 lb)   " Breastfeeding No   BMI 30.90 kg/m     GENERAL APPEARANCE: Appears well and in no acute distress  HEENT: HEAD: NCAT, EOMI, Moist mucous membranes  ABD: Non distended, mild right CVA tenderness  LUNGS: No respiratory distress   NUERO: AOx3, normal mentation  PSYCH: normal mood and affect    Results for orders placed or performed in visit on 01/31/20   *UA reflex to Microscopic and Culture ([facility] (except [place] and [place])     Status: Abnormal   Result Value Ref Range    Color Urine Yellow     Appearance Urine Cloudy     Glucose Urine 250 (A) NEG^Negative mg/dL    Bilirubin Urine Negative NEG^Negative    Ketones Urine Negative NEG^Negative mg/dL    Specific Gravity Urine >1.030 1.003 - 1.035    Blood Urine Small (A) NEG^Negative    pH Urine 6.0 5.0 - 7.0 pH    Protein Albumin Urine Negative NEG^Negative mg/dL    Urobilinogen Urine 0.2 0.2 - 1.0 EU/dL    Nitrite Urine Positive (A) NEG^Negative    Leukocyte Esterase Urine Small (A) NEG^Negative    Source Midstream Urine    Urine Microscopic     Status: Abnormal   Result Value Ref Range    WBC Urine 10-25 (A) OTO5^0 - 5 /HPF    RBC Urine O - 2 OTO2^O - 2 /HPF    Bacteria Urine Many (A) NEG^Negative /HPF        ASSESSMENT/PLAN:  Ameena was seen today for urgent care and uti.    Diagnoses and all orders for this visit:    Dysuria  -     *UA reflex to Microscopic and Culture ([facility] (except [place] and [place])  -     Urine Microscopic    Acute cystitis with hematuria  -     ciprofloxacin (CIPRO) 250 MG tablet; Take 1 tablet (250 mg) by mouth 2 times daily for 3 days    Nonspecific finding on examination of urine  -     Urine Culture Aerobic Bacterial      1) UA is suggestive of acute cystitis.patient did report a history of back pain and there was mild right-sided CVA tenderness so I want to treat patient with ciprofloxacin.  I think the likelihood of this being pyelonephritis or ascending infection is quite low given that  she overall feels well and there is no fever.  That being said we did go over symptoms that would cause her to follow-up immediately in the clinic or ER.  Side effects reviewed and discussed. Consider OTC pyridium, cranberry juice and plenty of fluids.  I have also asked patient to follow-up with her primary care clinic due to the glucose in her urine.  She states she is not very compliant diabetic.  Counseled on the importance of staying compliant and to follow-up on this issue.   The plan of care was reviewed and discussed.They understand and agree with the plan. A printed summary was given including instructions and medications.    Yeyo Mckeon PA-C

## 2020-02-03 ENCOUNTER — MYC MEDICAL ADVICE (OUTPATIENT)
Dept: FAMILY MEDICINE | Facility: CLINIC | Age: 60
End: 2020-02-03

## 2020-02-03 LAB
BACTERIA SPEC CULT: ABNORMAL
SPECIMEN SOURCE: ABNORMAL

## 2020-03-02 ENCOUNTER — HEALTH MAINTENANCE LETTER (OUTPATIENT)
Age: 60
End: 2020-03-02

## 2020-03-31 ENCOUNTER — VIRTUAL VISIT (OUTPATIENT)
Dept: FAMILY MEDICINE | Facility: CLINIC | Age: 60
End: 2020-03-31
Payer: COMMERCIAL

## 2020-03-31 DIAGNOSIS — I10 HYPERTENSION GOAL BP (BLOOD PRESSURE) < 140/90: ICD-10-CM

## 2020-03-31 DIAGNOSIS — E78.5 HYPERLIPIDEMIA WITH TARGET LDL LESS THAN 130: ICD-10-CM

## 2020-03-31 DIAGNOSIS — Z13.29 SCREENING FOR THYROID DISORDER: ICD-10-CM

## 2020-03-31 DIAGNOSIS — F43.23 ADJUSTMENT DISORDER WITH MIXED ANXIETY AND DEPRESSED MOOD: ICD-10-CM

## 2020-03-31 DIAGNOSIS — E11.65 TYPE 2 DIABETES MELLITUS WITH HYPERGLYCEMIA, WITHOUT LONG-TERM CURRENT USE OF INSULIN (H): Primary | ICD-10-CM

## 2020-03-31 DIAGNOSIS — Z13.21 ENCOUNTER FOR VITAMIN DEFICIENCY SCREENING: ICD-10-CM

## 2020-03-31 PROCEDURE — 99214 OFFICE O/P EST MOD 30 MIN: CPT | Mod: TEL | Performed by: NURSE PRACTITIONER

## 2020-03-31 RX ORDER — HYDROCHLOROTHIAZIDE 25 MG/1
25 TABLET ORAL DAILY
Qty: 90 TABLET | Refills: 3 | Status: SHIPPED | OUTPATIENT
Start: 2020-03-31 | End: 2021-01-28

## 2020-03-31 RX ORDER — SIMVASTATIN 20 MG
20 TABLET ORAL AT BEDTIME
Qty: 90 TABLET | Refills: 3 | Status: SHIPPED | OUTPATIENT
Start: 2020-03-31 | End: 2021-01-28

## 2020-03-31 RX ORDER — VENLAFAXINE HYDROCHLORIDE 37.5 MG/1
112.5 CAPSULE, EXTENDED RELEASE ORAL DAILY
Qty: 270 CAPSULE | Refills: 3 | Status: CANCELLED | OUTPATIENT
Start: 2020-03-31

## 2020-03-31 RX ORDER — METFORMIN HCL 500 MG
1000 TABLET, EXTENDED RELEASE 24 HR ORAL 2 TIMES DAILY WITH MEALS
Qty: 360 TABLET | Refills: 3 | Status: SHIPPED | OUTPATIENT
Start: 2020-03-31 | End: 2021-01-28

## 2020-03-31 ASSESSMENT — PATIENT HEALTH QUESTIONNAIRE - PHQ9: SUM OF ALL RESPONSES TO PHQ QUESTIONS 1-9: 1

## 2020-03-31 NOTE — PROGRESS NOTES
"Subjective     Inger Wegener is a 59 year old female who is being evaluated via a billable telephone visit.      The patient has been notified of following:     \"This telephone visit will be conducted via a call between you and your physician/provider. We have found that certain health care needs can be provided without the need for a physical exam.  This service lets us provide the care you need with a short phone conversation.  If a prescription is necessary we can send it directly to your pharmacy.  If lab work is needed we can place an order for that and you can then stop by our lab to have the test done at a later time.    If during the course of the call the physician/provider feels a telephone visit is not appropriate, you will not be charged for this service.\"     Patient has given verbal consent for Telephone visit?  Yes    Inger Wegener complains of   Chief Complaint   Patient presents with     Diabetes     recheck       ALLERGIES  Erythromycin    Blood sugars:  Have been running high.  \"I'm not good about taking my blood sugars but they have been running high.\"  She s again tryingto eat healthier.  She is taking her medications as prescribed and she is requesting refills today.     Right foot/ankle pain, s/p fracture.  Still stiff, aching.  \"it feels clumsy to me.\"  \"Do I need to see Dr. Davis again?\"    Concerns  About \"diabetic foot.\"  No numbness, tingling, loss of sensation, etc.  \"I don't have problems. I am trying to be proactive.\"    Eye exams:  Every 2 years with retinal scans.      Patient Active Problem List   Diagnosis     CARDIOVASCULAR SCREENING; LDL GOAL LESS THAN 160     Hypertension goal BP (blood pressure) < 140/90     Flying phobia     Hyperlipidemia with target LDL less than 130     Adjustment reaction     Adjustment disorder with mixed anxiety and depressed mood     Closed nondisplaced dome fracture of right talus, sequela     Type 2 diabetes mellitus with hyperglycemia (H)     " Nasal congestion     History reviewed. No pertinent surgical history.    Social History     Tobacco Use     Smoking status: Passive Smoke Exposure - Never Smoker     Smokeless tobacco: Never Used   Substance Use Topics     Alcohol use: Yes     Family History   Problem Relation Age of Onset     Eye Disorder Mother      Hypertension Mother      Thyroid Disease Mother      Hearing Loss Father      Diabetes Maternal Grandmother          Current Outpatient Medications   Medication Sig Dispense Refill     ASPIRIN NOT PRESCRIBED (INTENTIONAL) continuous prn for other Reported on 3/8/2017       blood glucose (ACCU-CHEK FASTCLIX) lancing device Device to be used with lancets. 1 each 0     blood glucose monitoring (ACCU-CHEK FASTCLIX) lancets Use to test blood sugar 2 times daily or as directed.  Ok to substitute alternative if insurance prefers. 300 each 3     blood glucose monitoring (ACCU-CHEK SMARTVIEW) test strip Use to test blood sugar 2 times daily or as directed.  Ok to substitute alternative if insurance prefers. 180 each 3     Calcium Carbonate-Vitamin D3 (CALCIUM 600+D3) 600-400 MG-UNIT TABS Take 1 tablet by mouth daily       Cholecalciferol (VITAMIN D3) 2000 units CAPS Take 1 capsule by mouth daily       cyanocolbalamin (VITAMIN  B-12) 500 MCG tablet Take 500 mcg by mouth daily       fluticasone (FLONASE) 50 MCG/ACT nasal spray Spray 2 sprays into both nostrils daily 48 mL 3     hydrochlorothiazide (HYDRODIURIL) 25 MG tablet Take 1 tablet (25 mg) by mouth daily 90 tablet 3     metFORMIN (GLUCOPHAGE-XR) 500 MG 24 hr tablet Take 2 tablets (1,000 mg) by mouth 2 times daily (with meals) 360 tablet 3     simvastatin (ZOCOR) 20 MG tablet Take 1 tablet (20 mg) by mouth At Bedtime 90 tablet 3     Allergies   Allergen Reactions     Erythromycin Nausea     Recent Labs   Lab Test 04/23/19  0822 06/06/18  1729 12/26/17  1329 06/20/17  0948 12/29/16  1044   A1C 7.8* 7.0* 6.9* 6.7* 6.6*   *  --  85 114* 91   HDL 43*  --   48* 48* 47*   TRIG 202*  --  302* 158* 150*   ALT 55*  --  30  --  38   CR 0.75  --  0.61  --  0.73   GFRESTIMATED 88  --  >90  --  82   GFRESTBLACK >90  --  >90  --  >90  African American GFR Calc     POTASSIUM 3.6  --  3.6  --  3.4   TSH  --  3.44 4.94* 2.97  --       BP Readings from Last 3 Encounters:   01/31/20 138/80   04/23/19 123/76   02/11/19 146/88    Wt Readings from Last 3 Encounters:   01/31/20 81.6 kg (180 lb)   04/23/19 79.8 kg (176 lb)   02/11/19 81.2 kg (179 lb)                    Reviewed and updated as needed this visit by Provider  Tobacco  Allergies  Meds  Problems  Med Hx  Surg Hx  Fam Hx         Review of Systems   ROS COMP: Constitutional, HEENT, cardiovascular, pulmonary, GI, , musculoskeletal, neuro, skin, endocrine and psych systems are negative, except as otherwise noted.       Objective   Reported vitals:  There were no vitals taken for this visit.   alert and she sounds happy and upbeat  Psych: Alert and oriented times 3; coherent speech, normal   rate and volume, able to articulate logical thoughts, able   to abstract reason, no tangential thoughts, no hallucinations   or delusions      Diagnostic Test Results:  Labs reviewed in Epic  Labs ordered.  Patient will come in for fasting labs at her earliest convenience.         Assessment/Plan:    (E11.65) Type 2 diabetes mellitus with hyperglycemia, without long-term current use of insulin (H)  (primary encounter diagnosis)  Comment: uncertain  Plan: metFORMIN (GLUCOPHAGE-XR) 500 MG 24 hr tablet,         simvastatin (ZOCOR) 20 MG tablet, Albumin         Random Urine Quantitative with Creat Ratio,         Comprehensive metabolic panel, Hemoglobin A1c,         Lipid panel reflex to direct LDL Fasting, TSH         with free T4 reflex, Vitamin D Deficiency        Inger will come in to the clinic for fasting labs.  I encouraged her to eat fewer carbohydrates, increase aerobic activity if possible.   Weight loss encourged.  She is to  return to the clinic for a recheck in 3-6 months, sooner if problems.     (I10) Hypertension goal BP (blood pressure) < 140/90  Comment: uncertain  Plan: hydrochlorothiazide (HYDRODIURIL) 25 MG tablet,        Albumin Random Urine Quantitative with Creat         Ratio, Comprehensive metabolic panel        Since today's visit is via telephone due to the coronavirus pandemic, I am uncertain what her BP is today.  She feels healty and she is to come in for a face to face appointment in 3-6 months for a recheck, sooner prn.     (E78.5) Hyperlipidemia with target LDL less than 130  Comment: uncertain  Plan: simvastatin (ZOCOR) 20 MG tablet, Comprehensive        metabolic panel, Hemoglobin A1c, Lipid panel         reflex to direct LDL Fasting        She is to return for fasting labs.  Continue simvastatin.    (F43.23) Adjustment disorder with mixed anxiety and depressed mood  Comment:  Improved, no longer on medications  Plan:     (Z13.29) Screening for thyroid disorder  Comment:   Plan: TSH with free T4 reflex        Done today    (Z13.21) Encounter for vitamin deficiency screening  Comment:   Plan: Vitamin D Deficiency        Done today    Return in about 3 months (around 6/30/2020) for Medication follow up, Diabetes follow up.      Phone call duration:  18 minutes    Diane PIERSON CNP

## 2020-04-20 DIAGNOSIS — Z13.21 ENCOUNTER FOR VITAMIN DEFICIENCY SCREENING: ICD-10-CM

## 2020-04-20 DIAGNOSIS — Z13.29 SCREENING FOR THYROID DISORDER: ICD-10-CM

## 2020-04-20 DIAGNOSIS — E11.65 TYPE 2 DIABETES MELLITUS WITH HYPERGLYCEMIA, WITHOUT LONG-TERM CURRENT USE OF INSULIN (H): ICD-10-CM

## 2020-04-20 DIAGNOSIS — E78.5 HYPERLIPIDEMIA WITH TARGET LDL LESS THAN 130: ICD-10-CM

## 2020-04-20 DIAGNOSIS — I10 HYPERTENSION GOAL BP (BLOOD PRESSURE) < 140/90: ICD-10-CM

## 2020-04-20 LAB
ALBUMIN SERPL-MCNC: 3.9 G/DL (ref 3.4–5)
ALP SERPL-CCNC: 118 U/L (ref 40–150)
ALT SERPL W P-5'-P-CCNC: 88 U/L (ref 0–50)
ANION GAP SERPL CALCULATED.3IONS-SCNC: 7 MMOL/L (ref 3–14)
AST SERPL W P-5'-P-CCNC: 39 U/L (ref 0–45)
BILIRUB SERPL-MCNC: 0.5 MG/DL (ref 0.2–1.3)
BUN SERPL-MCNC: 13 MG/DL (ref 7–30)
CALCIUM SERPL-MCNC: 9.3 MG/DL (ref 8.5–10.1)
CHLORIDE SERPL-SCNC: 99 MMOL/L (ref 94–109)
CHOLEST SERPL-MCNC: 174 MG/DL
CO2 SERPL-SCNC: 27 MMOL/L (ref 20–32)
CREAT SERPL-MCNC: 0.67 MG/DL (ref 0.52–1.04)
GFR SERPL CREATININE-BSD FRML MDRD: >90 ML/MIN/{1.73_M2}
GLUCOSE SERPL-MCNC: 230 MG/DL (ref 70–99)
HBA1C MFR BLD: 9.2 % (ref 0–5.6)
HDLC SERPL-MCNC: 42 MG/DL
LDLC SERPL CALC-MCNC: 96 MG/DL
NONHDLC SERPL-MCNC: 132 MG/DL
POTASSIUM SERPL-SCNC: 3.7 MMOL/L (ref 3.4–5.3)
PROT SERPL-MCNC: 7.8 G/DL (ref 6.8–8.8)
SODIUM SERPL-SCNC: 133 MMOL/L (ref 133–144)
T4 FREE SERPL-MCNC: 0.94 NG/DL (ref 0.76–1.46)
TRIGL SERPL-MCNC: 178 MG/DL
TSH SERPL DL<=0.005 MIU/L-ACNC: 6.53 MU/L (ref 0.4–4)

## 2020-04-20 PROCEDURE — 80061 LIPID PANEL: CPT | Performed by: NURSE PRACTITIONER

## 2020-04-20 PROCEDURE — 82306 VITAMIN D 25 HYDROXY: CPT | Performed by: NURSE PRACTITIONER

## 2020-04-20 PROCEDURE — 80053 COMPREHEN METABOLIC PANEL: CPT | Performed by: NURSE PRACTITIONER

## 2020-04-20 PROCEDURE — 83036 HEMOGLOBIN GLYCOSYLATED A1C: CPT | Performed by: NURSE PRACTITIONER

## 2020-04-20 PROCEDURE — 84439 ASSAY OF FREE THYROXINE: CPT | Performed by: NURSE PRACTITIONER

## 2020-04-20 PROCEDURE — 36415 COLL VENOUS BLD VENIPUNCTURE: CPT | Performed by: NURSE PRACTITIONER

## 2020-04-20 PROCEDURE — 84443 ASSAY THYROID STIM HORMONE: CPT | Performed by: NURSE PRACTITIONER

## 2020-04-21 LAB — DEPRECATED CALCIDIOL+CALCIFEROL SERPL-MC: 22 UG/L (ref 20–75)

## 2020-05-27 DIAGNOSIS — F43.23 ADJUSTMENT DISORDER WITH MIXED ANXIETY AND DEPRESSED MOOD: ICD-10-CM

## 2020-06-01 RX ORDER — VENLAFAXINE HYDROCHLORIDE 37.5 MG/1
CAPSULE, EXTENDED RELEASE ORAL
Qty: 270 CAPSULE | Refills: 3 | OUTPATIENT
Start: 2020-06-01

## 2020-06-01 NOTE — TELEPHONE ENCOUNTER
Routing refill request to provider for review/approval because:  Drug not active on patient's medication list    Noted on last OV-  F43.23) Adjustment disorder with mixed anxiety and depressed mood  Comment:  Improved, no longer on medications  Plan:

## 2020-08-03 ENCOUNTER — VIRTUAL VISIT (OUTPATIENT)
Dept: FAMILY MEDICINE | Facility: CLINIC | Age: 60
End: 2020-08-03
Payer: COMMERCIAL

## 2020-08-03 DIAGNOSIS — R07.81 RIB PAIN ON RIGHT SIDE: Primary | ICD-10-CM

## 2020-08-03 PROCEDURE — 99213 OFFICE O/P EST LOW 20 MIN: CPT | Mod: GT | Performed by: PHYSICIAN ASSISTANT

## 2020-08-03 RX ORDER — METHOCARBAMOL 500 MG/1
500 TABLET, FILM COATED ORAL 3 TIMES DAILY
Qty: 30 TABLET | Refills: 1 | Status: SHIPPED | OUTPATIENT
Start: 2020-08-03 | End: 2023-10-01

## 2020-08-03 NOTE — PATIENT INSTRUCTIONS
Patient Education     Rib Contusion     A rib contusion is a bruise to one or more rib bones. It may cause pain, tenderness, swelling and a purplish discoloration. There may be a sharp pain while breathing.  You will be assessed for other injuries. You will likely be given pain medicine. Rib contusions heal on their own, without further treatment. However, pain may take weeks to months to go away.   Note that a small crack (fracture) in the rib may cause the same symptoms as a rib contusion. The small crack may not be seen on a chest X-ray. However, the conditions are managed in the same way.  Home care    Rest. Avoid heavy lifting, strenuous exertion, or any activity that causes pain.    Ice the area to reduce pain and swelling. Put ice cubes in a plastic bag or use a cold pack. (Wrap the cold source in a thin towel. Do not place it directly on your skin.) Ice the injured area for 20 minutes every 1 to 2 hours the first day. Continue with ice packs 3 to 4 times a day for the next 2 days, then as needed for the relief of pain and swelling.    Take any prescribed pain medicine as directed by your healthcare provider. If none was prescribed, take acetaminophen, ibuprofen, or naproxen to control pain.    If you have a significant injury, you may be given a device called an incentive spirometer to keep your lungs healthy. Use as directed.  Follow-up care  Follow up with your healthcare provider during the next week or as directed.  When to seek medical advice  Call your healthcare provider for any of the following:    Shortness of breath or trouble breathing    Increasing chest pain with breathing    Coughing    Dizziness, weakness, or fainting    New or worsening pain    Fever of 100.4 F (38 C) or higher, or as directed by your healthcare provider  Date Last Reviewed: 2/1/2017 2000-2019 [employer]. [address] [place] [address], [place] [address]. All rights reserved. This information is not intended as a  substitute for professional medical care. Always follow your healthcare professional's instructions.

## 2020-08-03 NOTE — PROGRESS NOTES
"Inger Wegener is a 60 year old female who is being evaluated via a billable video visit.      The patient has been notified of following:     \"This video visit will be conducted via a call between you and your physician/provider. We have found that certain health care needs can be provided without the need for an in-person physical exam.  This service lets us provide the care you need with a video conversation.  If a prescription is necessary we can send it directly to your pharmacy.  If lab work is needed we can place an order for that and you can then stop by our lab to have the test done at a later time.    Video visits are billed at different rates depending on your insurance coverage.  Please reach out to your insurance provider with any questions.    If during the course of the call the physician/provider feels a video visit is not appropriate, you will not be charged for this service.\"    Patient has given verbal consent for Video visit? Yes  How would you like to obtain your AVS? MyChart  If you are dropped from the video visit, the video invite should be resent to: Send to e-mail at: [email or web address]  Will anyone else be joining your video visit? No    Subjective     Inger Wegener is a 60 year old female who presents today via video visit for the following health issues:    HPI  Vitals - Patient Reported  Weight (Patient Reported): 81.6 kg (180 lb)  Height (Patient Reported): 157.5 cm (5' 2\")  BMI (Based on Pt Reported Ht/Wt): 32.92        Musculoskeletal problem/pain      Duration: x 4 days    Description  Location: back rib pain    Intensity:  severe    Accompanying signs and symptoms: sleep problems, radiation of pain to legs    History  Previous similar problem: no   Previous evaluation:  none    Precipitating or alleviating factors:  Trauma or overuse: YES- fall  Aggravating factors include: standing, walking, lifting and overuse    Therapies tried and outcome: Ibuprofen which helps " somewhat    Patient slipped 3-4 wood steps at home, mostly right side.    Right elbow initially bruised and painful, but improving.    Patient taking 600 mg ibuprofen (started with 800 mg this morning) every 4-5 hours. Still not sleeping great. Slowly improving, but getting very frustrated by everything.    No chest congestion, cough, shortness of breath.         Video Start Time: 10:23 AM        Patient Active Problem List   Diagnosis     CARDIOVASCULAR SCREENING; LDL GOAL LESS THAN 160     Hypertension goal BP (blood pressure) < 140/90     Flying phobia     Hyperlipidemia with target LDL less than 130     Adjustment reaction     Adjustment disorder with mixed anxiety and depressed mood     Closed nondisplaced dome fracture of right talus, sequela     Type 2 diabetes mellitus with hyperglycemia (H)     Nasal congestion     History reviewed. No pertinent surgical history.    Social History     Tobacco Use     Smoking status: Passive Smoke Exposure - Never Smoker     Smokeless tobacco: Never Used   Substance Use Topics     Alcohol use: Yes     Family History   Problem Relation Age of Onset     Eye Disorder Mother      Hypertension Mother      Thyroid Disease Mother      Hearing Loss Father      Diabetes Maternal Grandmother          Current Outpatient Medications   Medication Sig Dispense Refill     ASPIRIN NOT PRESCRIBED (INTENTIONAL) continuous prn for other Reported on 3/8/2017       blood glucose (ACCU-CHEK FASTCLIX) lancing device Device to be used with lancets. 1 each 0     blood glucose monitoring (ACCU-CHEK FASTCLIX) lancets Use to test blood sugar 2 times daily or as directed.  Ok to substitute alternative if insurance prefers. 300 each 3     blood glucose monitoring (ACCU-CHEK SMARTVIEW) test strip Use to test blood sugar 2 times daily or as directed.  Ok to substitute alternative if insurance prefers. 180 each 3     Calcium Carbonate-Vitamin D3 (CALCIUM 600+D3) 600-400 MG-UNIT TABS Take 1 tablet by mouth  "daily       Cholecalciferol (VITAMIN D3) 2000 units CAPS Take 1 capsule by mouth daily       cyanocolbalamin (VITAMIN  B-12) 500 MCG tablet Take 500 mcg by mouth daily       fluticasone (FLONASE) 50 MCG/ACT nasal spray Spray 2 sprays into both nostrils daily 48 mL 3     hydrochlorothiazide (HYDRODIURIL) 25 MG tablet Take 1 tablet (25 mg) by mouth daily 90 tablet 3     metFORMIN (GLUCOPHAGE-XR) 500 MG 24 hr tablet Take 2 tablets (1,000 mg) by mouth 2 times daily (with meals) 360 tablet 3     methocarbamol (ROBAXIN) 500 MG tablet Take 1 tablet (500 mg) by mouth 3 times daily 30 tablet 1     simvastatin (ZOCOR) 20 MG tablet Take 1 tablet (20 mg) by mouth At Bedtime 90 tablet 3     Allergies   Allergen Reactions     Erythromycin Nausea     Recent Labs   Lab Test 04/20/20  0852 04/23/19  0822 06/06/18  1729 12/26/17  1329   A1C 9.2* 7.8* 7.0* 6.9*   LDL 96 118*  --  85   HDL 42* 43*  --  48*   TRIG 178* 202*  --  302*   ALT 88* 55*  --  30   CR 0.67 0.75  --  0.61   GFRESTIMATED >90 88  --  >90   GFRESTBLACK >90 >90  --  >90   POTASSIUM 3.7 3.6  --  3.6   TSH 6.53*  --  3.44 4.94*      BP Readings from Last 3 Encounters:   01/31/20 138/80   04/23/19 123/76   02/11/19 146/88    Wt Readings from Last 3 Encounters:   01/31/20 81.6 kg (180 lb)   04/23/19 79.8 kg (176 lb)   02/11/19 81.2 kg (179 lb)                    Reviewed and updated as needed this visit by Provider  Tobacco  Allergies  Meds  Problems  Med Hx  Surg Hx  Fam Hx         Review of Systems   Constitutional, HEENT, cardiovascular, pulmonary, GI, , musculoskeletal, neuro, skin, endocrine and psych systems are negative, except as otherwise noted.      Objective    Vitals - Patient Reported  Weight (Patient Reported): 81.6 kg (180 lb)  Height (Patient Reported): 157.5 cm (5' 2\")  BMI (Based on Pt Reported Ht/Wt): 32.92        Physical Exam     GENERAL: Healthy, alert and no distress  EYES: Eyes grossly normal to inspection.  No discharge or erythema, or " "obvious scleral/conjunctival abnormalities.  RESP: No audible wheeze, cough, or visible cyanosis.  No visible retractions or increased work of breathing.    SKIN: Visible skin clear. No significant rash, abnormal pigmentation or lesions.  NEURO: Cranial nerves grossly intact.  Mentation and speech appropriate for age.  PSYCH: Mentation appears normal, affect normal/bright, judgement and insight intact, normal speech and appearance well-groomed.      Diagnostic Test Results:  Labs reviewed in Epic        Assessment & Plan     1. Rib pain on right side  Most likely bruise, but may need chest xray in near future if no improvement or any worsening in symptoms. Ok to continue with NSAIDS (ibuprofen, advil, aleve type products) for short period of time with additional prescription for muscle relaxer at bedtime sent to pharmacy; option for stronger pain medicine (norco etc) for bedtime only if no improvement with above over the next few days. Patient advised to give MyChart update makenna few days if needed.  Return to clinic with any worsening or changes in symptoms and follow up with PCP for routine care.   - methocarbamol (ROBAXIN) 500 MG tablet; Take 1 tablet (500 mg) by mouth 3 times daily  Dispense: 30 tablet; Refill: 1     BMI:   Estimated body mass index is 30.9 kg/m  as calculated from the following:    Height as of 1/31/20: 1.626 m (5' 4\").    Weight as of 1/31/20: 81.6 kg (180 lb).         Return in about 4 weeks (around 8/31/2020) for Routine visit, or sooner with worsening symptoms.    Mikayla Stevenson PA-C  [facility]      Video-Visit Details    Type of service:  Video Visit    Video End Time:10:40 AM    Originating Location (pt. Location): Home    Distant Location (provider location):  [facility]     Platform used for Video Visit: AmWell    Return in about 4 weeks (around 8/31/2020) for Routine visit, or sooner with worsening symptoms.       Mikayla Stevenson, " RUMA

## 2020-09-22 ENCOUNTER — MYC MEDICAL ADVICE (OUTPATIENT)
Dept: FAMILY MEDICINE | Facility: CLINIC | Age: 60
End: 2020-09-22

## 2020-09-22 ENCOUNTER — TELEPHONE (OUTPATIENT)
Dept: FAMILY MEDICINE | Facility: CLINIC | Age: 60
End: 2020-09-22

## 2020-09-22 ENCOUNTER — OFFICE VISIT (OUTPATIENT)
Dept: FAMILY MEDICINE | Facility: CLINIC | Age: 60
End: 2020-09-22

## 2020-09-22 ENCOUNTER — E-VISIT (OUTPATIENT)
Dept: FAMILY MEDICINE | Facility: CLINIC | Age: 60
End: 2020-09-22

## 2020-09-22 VITALS
TEMPERATURE: 98 F | DIASTOLIC BLOOD PRESSURE: 84 MMHG | HEART RATE: 92 BPM | WEIGHT: 174 LBS | BODY MASS INDEX: 29.71 KG/M2 | RESPIRATION RATE: 16 BRPM | SYSTOLIC BLOOD PRESSURE: 136 MMHG | HEIGHT: 64 IN | OXYGEN SATURATION: 99 %

## 2020-09-22 DIAGNOSIS — R30.0 DYSURIA: Primary | ICD-10-CM

## 2020-09-22 DIAGNOSIS — E11.65 TYPE 2 DIABETES MELLITUS WITH HYPERGLYCEMIA, WITHOUT LONG-TERM CURRENT USE OF INSULIN (H): ICD-10-CM

## 2020-09-22 DIAGNOSIS — R82.90 NONSPECIFIC FINDING ON EXAMINATION OF URINE: ICD-10-CM

## 2020-09-22 DIAGNOSIS — N39.0 ACUTE UTI: Primary | ICD-10-CM

## 2020-09-22 LAB
ALBUMIN UR-MCNC: NEGATIVE MG/DL
APPEARANCE UR: CLEAR
BACTERIA #/AREA URNS HPF: ABNORMAL /HPF
BILIRUB UR QL STRIP: NEGATIVE
COLOR UR AUTO: YELLOW
GLUCOSE UR STRIP-MCNC: 100 MG/DL
HGB UR QL STRIP: ABNORMAL
KETONES UR STRIP-MCNC: NEGATIVE MG/DL
LEUKOCYTE ESTERASE UR QL STRIP: ABNORMAL
NITRATE UR QL: NEGATIVE
PH UR STRIP: 6.5 PH (ref 5–7)
RBC #/AREA URNS AUTO: ABNORMAL /HPF
SOURCE: ABNORMAL
SP GR UR STRIP: 1.01 (ref 1–1.03)
UROBILINOGEN UR STRIP-ACNC: 0.2 EU/DL (ref 0.2–1)
WBC #/AREA URNS AUTO: ABNORMAL /HPF

## 2020-09-22 PROCEDURE — 99207 ZZC NON-BILLABLE SERV PER CHARTING: CPT | Performed by: NURSE PRACTITIONER

## 2020-09-22 PROCEDURE — 87086 URINE CULTURE/COLONY COUNT: CPT | Performed by: NURSE PRACTITIONER

## 2020-09-22 PROCEDURE — 81001 URINALYSIS AUTO W/SCOPE: CPT | Performed by: NURSE PRACTITIONER

## 2020-09-22 PROCEDURE — 99213 OFFICE O/P EST LOW 20 MIN: CPT | Performed by: NURSE PRACTITIONER

## 2020-09-22 RX ORDER — CIPROFLOXACIN 250 MG/1
250 TABLET, FILM COATED ORAL 2 TIMES DAILY
Qty: 6 TABLET | Refills: 0 | Status: SHIPPED | OUTPATIENT
Start: 2020-09-22 | End: 2020-10-27

## 2020-09-22 RX ORDER — SULFAMETHOXAZOLE/TRIMETHOPRIM 800-160 MG
1 TABLET ORAL 2 TIMES DAILY
Qty: 6 TABLET | Refills: 0 | Status: CANCELLED | OUTPATIENT
Start: 2020-09-22

## 2020-09-22 ASSESSMENT — MIFFLIN-ST. JEOR: SCORE: 1344.26

## 2020-09-22 NOTE — TELEPHONE ENCOUNTER
Reason for call:  Patient reporting a symptom    Symptom or request: pt states has symptoms of a urinary tract infection , has blood in urine wants to know if she can get meds or needs labs?    Duration (how long have symptoms been present): unsure    Have you been treated for this before? Yes    Additional comments:n/a    Phone Number patient can be reached at:  Home number on file [phone] (home)    Best Time:  asap    Can we leave a detailed message on this number:  YES    Call taken on 9/22/2020 at 10:02 AM by Roro Velázquez

## 2020-09-22 NOTE — TELEPHONE ENCOUNTER
I talked to pt.  2 UTI in last 2 years.  Blood in urine is a new sx for her.        Sent in an EVISIT to PCP.  Discussed options. Offered virtual visit for this afternoon.  Cost wise, pt would prefer the EVISIT. No current insurance.    PT would prefer treatment thru EVISIT. PT knows response can take 24 hours.  CHRISTIAN Soliman

## 2020-09-22 NOTE — PROGRESS NOTES
"Subjective     Inger Wegener is a 60 year old female who presents to clinic today for the following health issues:    HPI     Chief Complaint   Patient presents with     UTI     today      Urinary Frequency     Ameena awoke this morning with c/o urinary urgency and frequency.  She has had urinary frequency all day.  + Pelvic pressure.  She did have visible blood in her urine.  She denies fever or flank pain.  She denies nausea or vomiting.  She had a UTI in February.  This is concerning to her that she is having another bladder infection/bladder symptoms today.      Review of Systems   Constitutional, HEENT, cardiovascular, pulmonary, GI, , musculoskeletal, neuro, skin, endocrine and psych systems are negative, except as otherwise noted.      Objective    BP (!) 152/94 (BP Location: Left arm, Patient Position: Sitting, Cuff Size: Adult Regular)   Pulse 92   Temp 98  F (36.7  C) (Temporal)   Resp 16   Ht 1.626 m (5' 4\")   Wt 78.9 kg (174 lb)   SpO2 99%   BMI 29.87 kg/m    Body mass index is 29.87 kg/m .  Physical Exam   GENERAL APPEARANCE: healthy, alert and no distress. Smiling.   SKIN: warm and dry  PSYCH: mentation appears normal and affect normal/bright.  Good eye contact.      Results for orders placed or performed in visit on 09/22/20 (from the past 24 hour(s))   UA with Microscopic reflex to Culture    Specimen: Midstream Urine   Result Value Ref Range    Color Urine Yellow     Appearance Urine Clear     Glucose Urine 100 (A) NEG^Negative mg/dL    Bilirubin Urine Negative NEG^Negative    Ketones Urine Negative NEG^Negative mg/dL    Specific Gravity Urine 1.010 1.003 - 1.035    pH Urine 6.5 5.0 - 7.0 pH    Protein Albumin Urine Negative NEG^Negative mg/dL    Urobilinogen Urine 0.2 0.2 - 1.0 EU/dL    Nitrite Urine Negative NEG^Negative    Blood Urine Moderate (A) NEG^Negative    Leukocyte Esterase Urine Moderate (A) NEG^Negative    Source Midstream Urine     WBC Urine 10-25 (A) OTO5^0 - 5 /HPF    RBC " Urine 2-5 (A) OTO2^O - 2 /HPF    Bacteria Urine Few (A) NEG^Negative /HPF       Urine culture pending    Assessment & Plan     (N39.0) Acute UTI  (primary encounter diagnosis)  Comment: Acute  Plan: UA with Microscopic reflex to Culture, Urine         Culture Aerobic Bacterial, ciprofloxacin         (CIPRO) 250 MG tablet        Take antibiotics as prescribed. Maintain bladder hygiene: drink 64-80 oz water per day, frequent voiding, void before and after intercourse, drink a large glass of water after intercourse to ensure another void, after voiding/BMs wipe front to back, no bubble baths, cranberry juice okay.  I did discuss with the patient that 3 UTIs or more in 1 year would equal a referral to urology.  She will monitor.  In the event she has another UTI, she will ask for referral to urology.    (R82.90) Nonspecific finding on examination of urine  Comment: Acute  Plan: Urine Culture Aerobic Bacterial, ciprofloxacin         (CIPRO) 250 MG tablet        Urine culture pending    (E11.65) Type 2 diabetes mellitus with hyperglycemia, without long-term current use of insulin (H)  Comment: Chronic  Plan: Hemoglobin A1c, Glucose        The patient is due to come into the clinic to see me for a face-to-face appointment October 2020.  Labs were ordered today so that she can complete these labs a few days before her appointment with me.  I will plan to see her in 1 month for her diabetes follow-up, sooner if she has any concerns.           See Patient Instructions    Return in about 3 days (around 9/25/2020), or if symptoms worsen or fail to improve.    DANGELO [facility]

## 2020-09-22 NOTE — PATIENT INSTRUCTIONS
"Patient Education     Bladder Infection, Female (Adult)    Urine is normally doesn't have any bacteria in it. But bacteria can get into the urinary tract from the skin around the rectum. Or they can travel in the blood from elsewhere in the body. Once they are in your urinary tract, they can cause infection in the urethra (urethritis), the bladder (cystitis), or the kidneys (pyelonephritis).  The most common place for an infection is in the bladder. This is called a bladder infection. This is one of the most common infections in women. Most bladder infections are easily treated. They are not serious unless the infection spreads to the kidney.  The phrases \"bladder infection,\" \"UTI,\" and \"cystitis\" are often used to describe the same thing. But they are not always the same. Cystitis is an inflammation of the bladder. The most common cause of cystitis is an infection.  Symptoms  The infection causes inflammation in the urethra and bladder. This causes many of the symptoms. The most common symptoms of a bladder infection are:    Pain or burning when urinating    Having to urinate more often than usual    Urgent need to urinate    Only a small amount of urine comes out    Blood in urine    Abdominal discomfort. This is usually in the lower abdomen above the pubic bone.    Cloudy urine    Strong- or bad-smelling urine    Unable to urinate (urinary retention)    Unable to hold urine in (urinary incontinence)    Fever    Loss of appetite    Confusion (in older adults)  Causes  Bladder infections are not contagious. You can't get one from someone else, from a toilet seat, or from sharing a bath.  The most common cause of bladder infections is bacteria from the bowels. The bacteria get onto the skin around the opening of the urethra. From there, they can get into the urine and travel up to the bladder, causing inflammation and infection. This usually happens because of:    Wiping improperly after urinating. Always wipe from " front to back.    Bowel incontinence    Pregnancy    Procedures such as having a catheter inserted    Older age    Not emptying your bladder. This can allow bacteria a chance to grow in your urine.    Dehydration    Constipation    Sex    Use of a diaphragm for birth control   Treatment  Bladder infections are diagnosed by a urine test. They are treated with antibiotics and usually clear up quickly without complications. Treatment helps prevent a more serious kidney infection.  Medicines  Medicines can help in the treatment of a bladder infection:    Take antibiotics until they are used up, even if you feel better. It is important to finish them to make sure the infection has cleared.    You can use acetaminophen or ibuprofen for pain, fever, or discomfort, unless another medicine was prescribed. If you have chronic liver or kidney disease, talk with your healthcare provider before using these medicines. Also talk with your provider if you've ever had a stomach ulcer or gastrointestinal bleeding, or are taking blood-thinner medicines.    If you are given phenazopydridine to reduce burning with urination, it will cause your urine to become a bright orange color. This can stain clothing.  Care and prevention  These self-care steps can help prevent future infections:    Drink plenty of fluids to prevent dehydration and flush out your bladder. Do this unless you must restrict fluids for other health reasons, or your doctor told you not to.    Proper cleaning after going to the bathroom is important. Wipe from front to back after using the toilet to prevent the spread of bacteria.    Urinate more often. Don't try to hold urine in for a long time.    Wear loose-fitting clothes and cotton underwear. Avoid tight-fitting pants.    Improve your diet and prevent constipation. Eat more fresh fruit and vegetables, and fiber, and less junk and fatty foods.    Avoid sex until your symptoms are gone.    Avoid caffeine, alcohol, and  spicy foods. These can irritate your bladder.    Urinate right after intercourse to flush out your bladder.    If you use birth control pills and have frequent bladder infections, discuss it with your doctor.  Follow-up care  Call your healthcare provider if all symptoms are not gone after 3 days of treatment. This is especially important if you have repeat infections.  If a culture was done, you will be told if your treatment needs to be changed. If directed, you can call to find out the results.  If X-rays were done, you will be told if the results will affect your treatment.  Call 911  Call 911 if any of the following occur:    Trouble breathing    Hard to wake up or confusion    Fainting or loss of consciousness    Rapid heart rate  When to seek medical advice  Call your healthcare provider right away if any of these occur:    Fever of 100.4 F (38.0 C) or higher, or as directed by your healthcare provider    Symptoms are not better by the third day of treatment    Back or belly (abdominal) pain that gets worse    Repeated vomiting, or unable to keep medicine down    Weakness or dizziness    Vaginal discharge    Pain, redness, or swelling in the outer vaginal area (labia)  Date Last Reviewed: 10/1/2016    1094-0009 [employer]. [address] [place] [address], [place] [address]. All rights reserved. This information is not intended as a substitute for professional medical care. Always follow your healthcare professional's instructions.

## 2020-09-23 LAB
BACTERIA SPEC CULT: NORMAL
SPECIMEN SOURCE: NORMAL

## 2020-09-23 NOTE — RESULT ENCOUNTER NOTE
"Hello anger,    This note is to let you know that your urine culture came back showing \" mixed jose.\"  This actually is inconclusive for an active bladder infection.  Nonetheless, I would recommend that you take the antibiotics that were prescribed, drink extra water, urinate frequently, wipe from front to back etc.  Let me know if your bladder symptoms do not improve with your antibiotics.    I will plan to see you soon for your diabetes follow-up.  It was great to see you yesterday.    Diane Diaz APRN CNP  "

## 2020-10-23 DIAGNOSIS — E11.65 TYPE 2 DIABETES MELLITUS WITH HYPERGLYCEMIA, WITHOUT LONG-TERM CURRENT USE OF INSULIN (H): ICD-10-CM

## 2020-10-23 DIAGNOSIS — I10 HYPERTENSION GOAL BP (BLOOD PRESSURE) < 140/90: ICD-10-CM

## 2020-10-23 LAB
GLUCOSE SERPL-MCNC: 198 MG/DL (ref 70–99)
HBA1C MFR BLD: 9.1 % (ref 0–5.6)

## 2020-10-23 PROCEDURE — 82947 ASSAY GLUCOSE BLOOD QUANT: CPT | Performed by: NURSE PRACTITIONER

## 2020-10-23 PROCEDURE — 82043 UR ALBUMIN QUANTITATIVE: CPT | Performed by: NURSE PRACTITIONER

## 2020-10-23 PROCEDURE — 83036 HEMOGLOBIN GLYCOSYLATED A1C: CPT | Performed by: NURSE PRACTITIONER

## 2020-10-23 PROCEDURE — 36415 COLL VENOUS BLD VENIPUNCTURE: CPT | Performed by: NURSE PRACTITIONER

## 2020-10-24 LAB
CREAT UR-MCNC: 128 MG/DL
MICROALBUMIN UR-MCNC: 36 MG/L
MICROALBUMIN/CREAT UR: 27.73 MG/G CR (ref 0–25)

## 2020-10-27 ENCOUNTER — OFFICE VISIT (OUTPATIENT)
Dept: FAMILY MEDICINE | Facility: CLINIC | Age: 60
End: 2020-10-27
Payer: COMMERCIAL

## 2020-10-27 VITALS
WEIGHT: 172 LBS | DIASTOLIC BLOOD PRESSURE: 82 MMHG | RESPIRATION RATE: 16 BRPM | BODY MASS INDEX: 29.37 KG/M2 | HEIGHT: 64 IN | TEMPERATURE: 98.2 F | SYSTOLIC BLOOD PRESSURE: 138 MMHG | HEART RATE: 86 BPM | OXYGEN SATURATION: 98 %

## 2020-10-27 DIAGNOSIS — F43.21 GRIEF: ICD-10-CM

## 2020-10-27 DIAGNOSIS — I10 HYPERTENSION GOAL BP (BLOOD PRESSURE) < 140/90: ICD-10-CM

## 2020-10-27 DIAGNOSIS — Z87.440 PERSONAL HISTORY OF URINARY TRACT INFECTION: ICD-10-CM

## 2020-10-27 DIAGNOSIS — E11.65 TYPE 2 DIABETES MELLITUS WITH HYPERGLYCEMIA, WITHOUT LONG-TERM CURRENT USE OF INSULIN (H): Primary | ICD-10-CM

## 2020-10-27 PROCEDURE — 99214 OFFICE O/P EST MOD 30 MIN: CPT | Performed by: NURSE PRACTITIONER

## 2020-10-27 ASSESSMENT — PATIENT HEALTH QUESTIONNAIRE - PHQ9: SUM OF ALL RESPONSES TO PHQ QUESTIONS 1-9: 2

## 2020-10-27 ASSESSMENT — MIFFLIN-ST. JEOR: SCORE: 1335.19

## 2020-10-27 NOTE — RESULT ENCOUNTER NOTE
Sherry Lindquist,    Here are the results of your lab tests.  I will plan to go over these with you later today at your appointment.    Diane PIERSON CNP

## 2020-10-27 NOTE — PROGRESS NOTES
"Subjective     Inger Wegener is a 60 year old female who presents to clinic today for the following health issues:    HPI         Diabetes Follow-up    How often are you checking your blood sugar? Not at all    What concerns do you have today about your diabetes? Blood sugar is often over 200     Do you have any of these symptoms? (Select all that apply)  No numbness or tingling in feet.  No redness, sores or blisters on feet.  No complaints of excessive thirst.  No reports of blurry vision.  No significant changes to weight.    Have you had a diabetic eye exam in the last 12 months? Yes- Date of last eye exam: 2019,  Location: [facility]    BP Readings from Last 2 Encounters:   10/27/20 138/82   20 136/84     Hemoglobin A1C (%)   Date Value   10/23/2020 9.1 (H)   2020 9.2 (H)     LDL Cholesterol Calculated (mg/dL)   Date Value   2020 96   2019 118 (H)         How many servings of fruits and vegetables do you eat daily?  2-3    On average, how many sweetened beverages do you drink each day (Examples: soda, juice, sweet tea, etc.  Do NOT count diet or artificially sweetened beverages)?   1    How many days per week do you exercise enough to make your heart beat faster? 4    How many minutes a day do you exercise enough to make your heart beat faster? 20 - 29  How many days per week do you miss taking your medication? 2    What makes it hard for you to take your medications?  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  She is considering continuing 100% virtual.  She has not revealed to her employer that she is diabetic.  --Learning disability level, k-3rd grade.    Grief:  Mom passed away May 2020, pneumonia at age 83 years old.  Mom  in hospice.  \"I have not taken advantage of the grief support.\"  Ameena adopted her mom's miniature dachshound, \"Tomasa.\"  She has been off antidepressants since " "before COVID.  She is using other coping mechanisms.    UTI:  Did clear \"but it took a while.\"  Azo helped.  UTI came on quickyy.  No symptoms today.       Review of Systems   Constitutional, HEENT, cardiovascular, pulmonary, GI, , musculoskeletal, neuro, skin, endocrine and psych systems are negative, except as otherwise noted.      Objective    /82 (BP Location: Left arm, Patient Position: Sitting, Cuff Size: Adult Regular)   Pulse 86   Temp 98.2  F (36.8  C) (Temporal)   Resp 16   Ht 1.626 m (5' 4\")   Wt 78 kg (172 lb)   SpO2 98%   BMI 29.52 kg/m    Body mass index is 29.52 kg/m .  Physical Exam   GENERAL: healthy, alert and no distress  EYES: Eyes grossly normal to inspection, PERRL and conjunctivae and sclerae normal  HENT: ear canals and TM's normal, nose and mouth without ulcers or lesions  NECK: no adenopathy, no asymmetry, masses, or scars and thyroid normal to palpation  RESP: lungs clear to auscultation - no rales, rhonchi or wheezes  BREAST: normal without masses, tenderness or nipple discharge and no palpable axillary masses or adenopathy  CV: regular rate and rhythm, normal S1 S2, no S3 or S4, no murmur, click or rub, no peripheral edema and peripheral pulses strong  ABDOMEN: soft, nontender, no hepatosplenomegaly, no masses and bowel sounds normal  MS: no gross musculoskeletal defects noted, no edema  SKIN: no suspicious lesions or rashes  NEURO: Normal strength and tone, mentation intact and speech normal  PSYCH: mentation appears normal, affect normal/bright  Diabetic foot exam: normal DP and PT pulses, no trophic changes or ulcerative lesions and normal sensory exam    Diagnostics done 10/23/2020:  HgA1c 9.1  Glucose 198      Assessment & Plan     (E11.65) Type 2 diabetes mellitus with hyperglycemia, without long-term current use of insulin (H)  (primary encounter diagnosis)  Comment:   Plan: I reviewed with Ameena diabetes management, diet, meds, exercise, etc.  Today she is going to " "\"get back on the band wagon.\"  Next follow up with me in 3-6 months, sooner prn.  Yash covington consultation recommended.      (I10) Hypertension goal BP (blood pressure) < 140/90  Comment:   Plan: BP controlled today    (F43.21) Grief  Comment:   Plan: emotional support given.  Healthy self cares recommended.  Consider psychotherapy.      (Z87.440) Personal history of urinary tract infection  Comment:   Plan:              See Patient Instructions    Return in about 3 months (around 1/27/2021) for Diabetes follow up.    Diane Diaz, DANGELO CNP  [facility]      "

## 2020-10-27 NOTE — PATIENT INSTRUCTIONS
Vitamin D3 2000 units per day  Calcium 1500 mg per day    Patient Education     Diet: Diabetes  Food is an important tool that you can use to control diabetes and stay healthy. Eating well-balanced meals in the correct amounts will help you control your blood glucose levels and prevent low blood sugar reactions. It will also help you reduce the health risks of diabetes. There is no one specific diet that is right for everyone with diabetes. But there are general guidelines to follow. A registered dietitian (JAMES) will create a tailored diet approach that s just right for you. He or she will also help you plan healthy meals and snacks. If you have any questions, call your dietitian for advice.     Guidelines for success  Talk with your healthcare provider before starting a diabetes diet or weight loss program. If you haven't talked with a dietitian yet, ask your provider for a referral. The following guidelines can help you succeed:    Select foods from the 6 food groups below. Your dietitian will help you find food choices within each group. He or she will also show you serving sizes and how many servings you can have at each meal.  ? Grains, beans, and starchy vegetables  ? Vegetables  ? Fruit  ? Milk or yogurt  ? Meat, poultry, fish, or tofu  ? Healthy fats    Check your blood sugar levels as directed by your provider. Take any medicine as prescribed by your provider.    Learn to read food labels and pick the right portion sizes.    Eat only the amount of food in your meal plan. Eat about the same amount of food at regular times each day. Don t skip meals. Eat meals 4 to 5 hours apart, with snacks in between.    Limit alcohol. It raises blood sugar levels. Drink water or calorie-free diet drinks that use safe sweeteners.    Eat less fat to help lower your risk of heart disease. Use nonfat or low-fat dairy products and lean meats. Avoid fried foods. Use cooking oils that are unsaturated, such as olive, canola, or  peanut oil.    Talk with your dietitian about safe sugar substitutes.    Avoid added salt. It can contribute to high blood pressure, which can cause heart disease. People with diabetes already have a risk of high blood pressure and heart disease.    Stay at a healthy weight. If you need to lose weight, cut down on your portion sizes. But don t skip meals. Exercise is an important part of any weight management program. Talk with your provider about an exercise program that s right for you.    For more information about the best diet plan for you, talk with a registered dietitian (RD). To find an RD in your area, contact:  ? Academy of Nutrition and Dietetics www.eatright.org  ? The American Diabetes Association [phone] www.diabetes.org  Date Last Reviewed: 8/1/2016 2000-2019 [employer]. [address] [place] [address], [place] [address]. All rights reserved. This information is not intended as a substitute for professional medical care. Always follow your healthcare professional's instructions.

## 2020-12-20 ENCOUNTER — HEALTH MAINTENANCE LETTER (OUTPATIENT)
Age: 60
End: 2020-12-20

## 2021-01-22 DIAGNOSIS — E78.5 HYPERLIPIDEMIA WITH TARGET LDL LESS THAN 130: ICD-10-CM

## 2021-01-22 DIAGNOSIS — Z13.21 ENCOUNTER FOR VITAMIN DEFICIENCY SCREENING: ICD-10-CM

## 2021-01-22 DIAGNOSIS — E11.65 TYPE 2 DIABETES MELLITUS WITH HYPERGLYCEMIA, WITHOUT LONG-TERM CURRENT USE OF INSULIN (H): ICD-10-CM

## 2021-01-22 LAB
ALBUMIN SERPL-MCNC: 4.1 G/DL (ref 3.4–5)
ALP SERPL-CCNC: 109 U/L (ref 40–150)
ALT SERPL W P-5'-P-CCNC: 66 U/L (ref 0–50)
ANION GAP SERPL CALCULATED.3IONS-SCNC: 7 MMOL/L (ref 3–14)
AST SERPL W P-5'-P-CCNC: 29 U/L (ref 0–45)
BILIRUB SERPL-MCNC: 0.6 MG/DL (ref 0.2–1.3)
BUN SERPL-MCNC: 16 MG/DL (ref 7–30)
CALCIUM SERPL-MCNC: 9.5 MG/DL (ref 8.5–10.1)
CHLORIDE SERPL-SCNC: 103 MMOL/L (ref 94–109)
CHOLEST SERPL-MCNC: 191 MG/DL
CO2 SERPL-SCNC: 26 MMOL/L (ref 20–32)
CREAT SERPL-MCNC: 0.8 MG/DL (ref 0.52–1.04)
CREAT UR-MCNC: 164 MG/DL
DEPRECATED CALCIDIOL+CALCIFEROL SERPL-MC: 32 UG/L (ref 20–75)
GFR SERPL CREATININE-BSD FRML MDRD: 80 ML/MIN/{1.73_M2}
GLUCOSE SERPL-MCNC: 219 MG/DL (ref 70–99)
HBA1C MFR BLD: 9 % (ref 0–5.6)
HDLC SERPL-MCNC: 43 MG/DL
LDLC SERPL CALC-MCNC: 96 MG/DL
MICROALBUMIN UR-MCNC: 30 MG/L
MICROALBUMIN/CREAT UR: 18.6 MG/G CR (ref 0–25)
NONHDLC SERPL-MCNC: 148 MG/DL
POTASSIUM SERPL-SCNC: 3.6 MMOL/L (ref 3.4–5.3)
PROT SERPL-MCNC: 7.6 G/DL (ref 6.8–8.8)
SODIUM SERPL-SCNC: 136 MMOL/L (ref 133–144)
TRIGL SERPL-MCNC: 261 MG/DL

## 2021-01-22 PROCEDURE — 36415 COLL VENOUS BLD VENIPUNCTURE: CPT | Performed by: NURSE PRACTITIONER

## 2021-01-22 PROCEDURE — 82043 UR ALBUMIN QUANTITATIVE: CPT | Performed by: NURSE PRACTITIONER

## 2021-01-22 PROCEDURE — 80053 COMPREHEN METABOLIC PANEL: CPT | Performed by: NURSE PRACTITIONER

## 2021-01-22 PROCEDURE — 82306 VITAMIN D 25 HYDROXY: CPT | Performed by: NURSE PRACTITIONER

## 2021-01-22 PROCEDURE — 83036 HEMOGLOBIN GLYCOSYLATED A1C: CPT | Performed by: NURSE PRACTITIONER

## 2021-01-22 PROCEDURE — 80061 LIPID PANEL: CPT | Performed by: NURSE PRACTITIONER

## 2021-01-27 NOTE — RESULT ENCOUNTER NOTE
Sherry Lindquist,    Here are your finalized lab test results.  I look forward to our appointment tomorrow to go over these in depth.  Let me know if you have any questions in the meantime.    Diane PIERSON CNP

## 2021-01-27 NOTE — PROGRESS NOTES
Ameena is a 60 year old who is being evaluated via a billable video visit.      How would you like to obtain your AVS? MyChart  If the video visit is dropped, the invitation should be resent by: Send to e-mail at: [email or web address]  Will anyone else be joining your video visit? No    Video Start Time: 8:42 AM  Assessment & Plan     (E11.65) Uncontrolled type 2 diabetes mellitus with hyperglycemia (H)  (primary encounter diagnosis)  Comment: Uncontrolled  Plan: Continuous Blood Gluc Sensor (FREESTYLE MACK 2        SENSOR SYSTM) MISC  Today I had a long conversation with the patient about her uncontrolled diabetes, the potential long-term complications of this, and the importance of gaining control of her blood sugars. I did encourage her to eat smaller portions, fewer carbohydrates etc.        Regular aerobic activity is encouraged but due to her chronic ankle pain I do not think this is possible.  She has not been compliant with checking her blood sugars. I do think she would benefit from a mack freestyle with intermittent and frequent blood sugar monitoring capabilities. I did discuss with her how this works, the patches, the moises etc. She is excited and thinks this would be nice. She would like to know how her blood sugar response to her diet and activity levels. I did go ahead and send in an order for this today. I did encourage her to be in touch with our clinic pharmacist, Arleen Cordoba with cost and rebate help.  In the meantime I will ask her to keep a diet log, write down her blood sugars etc.  I asked her to come into the clinic to see me in 3 months for follow-up. At this time, she is anticipating wanted to do a blood draw  Before an appointment and then do a virtual appointment as well. I agree and think that is fine idea. If she changes her mind, I be happy to see her for a face-to-face appointment.      (I10) Hypertension goal BP (blood pressure) < 140/90  Comment: Uncertain today  Plan:  "hydrochlorothiazide (HYDRODIURIL) 25 MG tablet        I do want her to continue taking her medication.  I want her to come to the clinic for a check with me within 3 months.  She does not want to do a face-to-face appointment at that time but she will plan to come into the clinic a few days before her appointment for labs and I do want her to get her blood pressure checked at that time.  Healthy diet is encouraged, less salt, more physical activity etc.    (E11.65) Type 2 diabetes mellitus with hyperglycemia, without long-term current use of insulin (H)  Comment: Uncontrolled  Plan: metFORMIN (GLUCOPHAGE-XR) 500 MG 24 hr tablet,         simvastatin (ZOCOR) 20 MG tablet        As above    (E78.5) Hyperlipidemia with target LDL less than 130  Comment:   Plan: simvastatin (ZOCOR) 20 MG tablet        She is to continue her simvastatin 20 mg a day and a lower fat diet.  Refills given.    (R09.81) Nasal congestion  Comment:   Plan: fluticasone (FLONASE) 50 MCG/ACT nasal spray        Refills given    (K21.9) Gastroesophageal reflux disease without esophagitis  Comment: Worse/etiology uncertain  Plan: famotidine (PEPCID) 20 MG tablet        I did tell the patient I do not like the thought that she is having more acid.  I do think she would benefit from taking famotidine every day versus taking it intermittently.  I want her to take the famotidine the next 2 to 4 weeks and then as needed.  She is to avoid food triggers, use Tums intermittently.  She is also to let me know if her symptoms do not improve as I will want her to follow-up with gastro.  She agrees.         BMI:   Estimated body mass index is 29.52 kg/m  as calculated from the following:    Height as of 10/27/20: 1.626 m (5' 4\").    Weight as of 10/27/20: 78 kg (172 lb).   Weight management plan: Discussed healthy diet and exercise guidelines      See Patient Instructions    Return in about 3 months (around 4/28/2021) for Diabetes follow up, Follow up regarding " "today's concerns.    Diane Diaz, DANGELO CNP  [facility]    Kasey Lindquist is a 60 year old who presents to clinic today by video visit for the following health issues     HPI       Diabetes Follow-up    How often are you checking your blood sugar? A few times a month  What time of day are you checking your blood sugars (select all that apply)?  random    Test results.  \"I saw them. Some of them are not good.\"  Since her last appointment with me, she is taking her meds as prescribed.  She is not exercising.  She has ankle problems and she is unable to exercise.  With the holidays, her diet was not good.  \"it is winter, I am not exercising, and I am stress eating.\"  She is not checking her blood sugars at home. \"I can but I don't.\"  She denies symptoms of high or low blood sugar.  She was happy tht her last a1c was lower, but it wasn't as low as she would like it to be.      Anxiety.  Stress.  Requesting an accommodation to work from home.  Covid pandemic.  She is worried about the pandemic and being immunocompromised.  She is scheduled to go back to work 2/1/2021.  Poor communication from the district.   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  She is requesting Telework, work from home until she gets both doses of the vaccine.  , [employer] school, [place].  Not going  Back to hybrid.  Full in person.   \"the exposure risk for me is high.\"      Stress.  Acidy stomach and diarrhea.  4 liquidy stools per day.  Taking tums, chewable.  Some days she takes 6-8 tablets per day.        BP Readings from Last 2 Encounters:   10/27/20 138/82   09/22/20 136/84     Hemoglobin A1C (%)   Date Value   01/22/2021 9.0 " (H)   10/23/2020 9.1 (H)     LDL Cholesterol Calculated (mg/dL)   Date Value   01/22/2021 96   04/20/2020 96           Review of Systems   Constitutional, HEENT, cardiovascular, pulmonary, GI, , musculoskeletal, neuro, skin, endocrine and psych systems are negative, except as otherwise noted.      Objective           Vitals:  No vitals were obtained today due to virtual visit.    Physical Exam   GENERAL: Healthy, alert and no distress  EYES: Eyes grossly normal to inspection.  No discharge or erythema, or obvious scleral/conjunctival abnormalities.  RESP: No audible wheeze, cough, or visible cyanosis.  No visible retractions or increased work of breathing.    SKIN: Visible skin clear. No significant rash, abnormal pigmentation or lesions.  NEURO: Cranial nerves grossly intact.  Mentation and speech appropriate for age.  PSYCH: Mentation appears normal, affect normal/bright, judgement and insight intact, normal speech and appearance well-groomed.    Diagnostics reviewed.  Additional labs ordered.            Video-Visit Details    Type of service:  Video Visit    Video End Time:9:22am    Originating Location (pt. Location): Home    Distant Location (provider location):  [facility]     Platform used for Video Visit: [employer]

## 2021-01-28 ENCOUNTER — TELEPHONE (OUTPATIENT)
Dept: FAMILY MEDICINE | Facility: CLINIC | Age: 61
End: 2021-01-28

## 2021-01-28 ENCOUNTER — VIRTUAL VISIT (OUTPATIENT)
Dept: FAMILY MEDICINE | Facility: CLINIC | Age: 61
End: 2021-01-28
Payer: COMMERCIAL

## 2021-01-28 DIAGNOSIS — E11.65 UNCONTROLLED TYPE 2 DIABETES MELLITUS WITH HYPERGLYCEMIA (H): Primary | ICD-10-CM

## 2021-01-28 DIAGNOSIS — E11.65 TYPE 2 DIABETES MELLITUS WITH HYPERGLYCEMIA, WITHOUT LONG-TERM CURRENT USE OF INSULIN (H): ICD-10-CM

## 2021-01-28 DIAGNOSIS — E78.5 HYPERLIPIDEMIA WITH TARGET LDL LESS THAN 130: ICD-10-CM

## 2021-01-28 DIAGNOSIS — R09.81 NASAL CONGESTION: ICD-10-CM

## 2021-01-28 DIAGNOSIS — K21.9 GASTROESOPHAGEAL REFLUX DISEASE WITHOUT ESOPHAGITIS: ICD-10-CM

## 2021-01-28 DIAGNOSIS — I10 HYPERTENSION GOAL BP (BLOOD PRESSURE) < 140/90: ICD-10-CM

## 2021-01-28 PROCEDURE — 99215 OFFICE O/P EST HI 40 MIN: CPT | Mod: 95 | Performed by: NURSE PRACTITIONER

## 2021-01-28 RX ORDER — SIMVASTATIN 20 MG
20 TABLET ORAL AT BEDTIME
Qty: 90 TABLET | Refills: 3 | Status: SHIPPED | OUTPATIENT
Start: 2021-01-28 | End: 2021-03-29

## 2021-01-28 RX ORDER — FAMOTIDINE 20 MG/1
20 TABLET, FILM COATED ORAL 2 TIMES DAILY PRN
COMMUNITY
Start: 2021-01-28 | End: 2021-11-10

## 2021-01-28 RX ORDER — HYDROCHLOROTHIAZIDE 25 MG/1
25 TABLET ORAL DAILY
Qty: 90 TABLET | Refills: 3 | Status: SHIPPED | OUTPATIENT
Start: 2021-01-28 | End: 2021-03-29

## 2021-01-28 RX ORDER — FLUTICASONE PROPIONATE 50 MCG
2 SPRAY, SUSPENSION (ML) NASAL DAILY
Qty: 48 ML | Refills: 3 | Status: SHIPPED | OUTPATIENT
Start: 2021-01-28

## 2021-01-28 RX ORDER — METFORMIN HCL 500 MG
1000 TABLET, EXTENDED RELEASE 24 HR ORAL 2 TIMES DAILY WITH MEALS
Qty: 360 TABLET | Refills: 3 | Status: SHIPPED | OUTPATIENT
Start: 2021-01-28 | End: 2021-03-29

## 2021-01-28 NOTE — TELEPHONE ENCOUNTER
Please process this PA.  The patient has been noncompliant with her diabetes management because of finger prick/blood sugar monitoring.  We are hoping that the mack freestyle will unable her toDo more frequent blood sugar checks and gain control of her diabetes.

## 2021-01-28 NOTE — LETTER
January 28, 2021      Ameena MARIE Wegener  [address] [place] [address]        To Whom It May Concern,     Due to a medical condition, it is my opinion that Ameena have a work accommodation to work from home. This recommendation will stand until 2 weeks after her 2nd COVID vaccine.    Sincerely,        DANGELO Lockwood CNP

## 2021-01-28 NOTE — TELEPHONE ENCOUNTER
Prior Authorization Retail Medication Request    Medication/Dose: freestyle mack 2  ICD code (if different than what is on RX):    Previously Tried and Failed:    Rationale:     Insurance Name:  [facility]  Insurance ID: [id number]      Pharmacy Information (if different than what is on RX)  Name:   Phone:

## 2021-01-29 ENCOUNTER — TELEPHONE (OUTPATIENT)
Dept: FAMILY MEDICINE | Facility: CLINIC | Age: 61
End: 2021-01-29

## 2021-01-29 NOTE — TELEPHONE ENCOUNTER
PRIOR AUTHORIZATION DENIED    Medication: freestyle mack 2-DENIED    Denial Date: 1/29/2021    Denial Rational: PLAN EXCLUSION          Appeal Information:

## 2021-01-29 NOTE — TELEPHONE ENCOUNTER
Central Prior Authorization Team   Phone: [phone]      PA Initiation    Medication: freestyle mack 2-Initiated  Insurance Company: [employer] - Phone [phone] Fax [phone]  Pharmacy Filling the Rx: [facility] - [place], [place] - [address]  Filling Pharmacy Phone: [phone]  Filling Pharmacy Fax:    Start Date: 1/29/2021

## 2021-02-02 NOTE — TELEPHONE ENCOUNTER
Please call the patient.  She was going to check in to getting this through the website or doing a 2 week free trial.    Does she need me to do anything with this PA?

## 2021-02-02 NOTE — TELEPHONE ENCOUNTER
PA was denied. Please order alternative med with complete SIG or begin appeal process.     If you would like to appeal:   Create letter of medical necessity or    Compile supporting clinical documentation in EPIC Telephone encounter (TE).    Route TE to: [facility].    PRIOR AUTHORIZATION DENIED     Medication: freestyle mack 2-DENIED     Denial Date: 1/29/2021     Denial Rational: PLAN EXCLUSION

## 2021-02-02 NOTE — TELEPHONE ENCOUNTER
Forms completed and mail original to [employer] [place] [employer] and a mail a copy to pt. Inger A Wegener  [address] [place] [address]    Attn: ELVIE  [employer]  3rd Floor  [address] [place] [address]    Herb Gonsalves MA

## 2021-08-08 ENCOUNTER — HEALTH MAINTENANCE LETTER (OUTPATIENT)
Age: 61
End: 2021-08-08

## 2021-09-06 ENCOUNTER — MYC MEDICAL ADVICE (OUTPATIENT)
Dept: FAMILY MEDICINE | Facility: CLINIC | Age: 61
End: 2021-09-06

## 2021-09-06 DIAGNOSIS — F43.23 ADJUSTMENT DISORDER WITH MIXED ANXIETY AND DEPRESSED MOOD: ICD-10-CM

## 2021-09-08 PROBLEM — R09.81 NASAL CONGESTION: Status: RESOLVED | Noted: 2018-04-19 | Resolved: 2021-09-08

## 2021-09-08 RX ORDER — VENLAFAXINE HYDROCHLORIDE 37.5 MG/1
112.5 CAPSULE, EXTENDED RELEASE ORAL DAILY
Qty: 270 CAPSULE | Refills: 3 | Status: SHIPPED | OUTPATIENT
Start: 2021-09-08 | End: 2022-06-03

## 2021-09-08 NOTE — TELEPHONE ENCOUNTER
ASSESSMENT / PLAN:  (F43.23) Adjustment disorder with mixed anxiety and depressed mood  Comment: #3 tablets daily  Plan: venlafaxine (EFFEXOR-XR) 37.5 MG 24 hr capsule              PHQ 2/11/2019 3/31/2020 10/27/2020   PHQ-9 Total Score 1 1 2   Q9: Thoughts of better off dead/self-harm past 2 weeks Not at all Not at all Not at all       Hello! Dr. Griffith is out on maternity leave (she had a girl!) but will be back as you know in November.  I am happy to renew the Effexor, although it looks like you've been off this medication since March? You were on #3 37.5 mg tablets daily before, and I don't know if you ramped up to this dose or started at this dose.  I DO recommend that you schedule a virtual visit to review this medication and depression symptoms with any of the [facility] (or other) providers. I usually have openings on Tuesdays, and some providers have slots until 6 pm if you need a time later in the day.    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 The first day of school has never been so stressful!    I sent your refill to [facility] on [address].    Sincerely,  Dr. Diane Stein MD  9/8/2021    ===View-only below this line===

## 2021-09-09 NOTE — LETTER
[address]  [address]  [phone]      June 21, 2017      Inger Wegener  [address] [address]              Ameena,    Congrats --all your labs look great --see attached lab result sheet .    See you in December .    Yash Ornelas Rph.  Medication Therapy Management Provider  [phone]     Routine safety standards initiated.  Routine nursing standards initiated.

## 2021-09-11 NOTE — TELEPHONE ENCOUNTER
Patient is requesting a 90 day supply.     LISINOPRIL 10 MG      Last Written Prescription Date: 12-29-16  Last Fill Quantity: 30, # refills: 0  [place] [facility] [place] [facility] or [facility] prescribing provider: 12-29-16       POTASSIUM   Date Value Ref Range Status   12/29/2016 3.4 3.4 - 5.3 mmol/L Final     CREATININE   Date Value Ref Range Status   12/29/2016 0.73 0.52 - 1.04 mg/dL Final     BP Readings from Last 3 Encounters:   12/29/16 137/85   08/30/16 142/80   07/12/16 124/86       
Patient is requesting a 90 day supply.    ACCU-CHEK TST STP      Last Written Prescription Date: 12-29-16  Last Fill Quantity: 100,  # refills: 11   Last Office Visit with [facility], [facility] or [facility] prescribing provider: 12-29-16                                                 
Sent in test strips for 90 day supply.  Sent in lisinopril refill.  CHRISTIAN Soliman    
No

## 2021-09-29 DIAGNOSIS — R07.81 RIB PAIN ON RIGHT SIDE: ICD-10-CM

## 2021-09-30 RX ORDER — METHOCARBAMOL 500 MG/1
TABLET, FILM COATED ORAL
Qty: 30 TABLET | Refills: 1 | OUTPATIENT
Start: 2021-09-30

## 2021-09-30 NOTE — TELEPHONE ENCOUNTER
Methocarbamol was last written 8/3/20 with one refill by  JOSUE Stevenson for rib pain    Pt has appt on 11/10 with Dr Griffith as her PCP Grzegorz has left.     Pt last seen 1/28/21 in virtual visit.     Methocarbamol was denied to pharmacy with note she needs appt    Anjana Davis RN, BSN  [facility]

## 2021-10-03 ENCOUNTER — HEALTH MAINTENANCE LETTER (OUTPATIENT)
Age: 61
End: 2021-10-03

## 2021-11-07 ASSESSMENT — ENCOUNTER SYMPTOMS
ABDOMINAL PAIN: 0
JOINT SWELLING: 0
FREQUENCY: 0
HEARTBURN: 1
SHORTNESS OF BREATH: 0
CONSTIPATION: 0
COUGH: 1
HEMATOCHEZIA: 0
WEAKNESS: 0
HEADACHES: 0
ARTHRALGIAS: 0
DIARRHEA: 0
PALPITATIONS: 0
HEMATURIA: 0
CHILLS: 0
BREAST MASS: 0
NERVOUS/ANXIOUS: 0
FEVER: 0
MYALGIAS: 1
DYSURIA: 0
SORE THROAT: 0
EYE PAIN: 0
DIZZINESS: 0
NAUSEA: 0
PARESTHESIAS: 0

## 2021-11-07 NOTE — PROGRESS NOTES
62 yo F PMH HLD, T2DM, HTN.     T2DM - On metformin 1000 mg bid, simvastatin 20 mg daily. Last A1c 9% 1/22/21.   Last eye exam ***      HTN - On hydrochlorothiazide 25 mg daily.     H/o depression - On Effexor 112.5 mg XR daily.     Last mammogram 7/19/19 and normal. Overdue.  Last colonoscopy 1/1/2011 and no history of abnormals. Overdue.

## 2021-11-09 ASSESSMENT — ENCOUNTER SYMPTOMS
HEARTBURN: 1
FREQUENCY: 0
COUGH: 1
HEADACHES: 0
HEMATURIA: 0
NERVOUS/ANXIOUS: 0
EYE PAIN: 0
ARTHRALGIAS: 0
JOINT SWELLING: 0
FEVER: 0
WEAKNESS: 0
ABDOMINAL PAIN: 0
HEMATOCHEZIA: 0
MYALGIAS: 1
CONSTIPATION: 0
CHILLS: 0
SHORTNESS OF BREATH: 0
NAUSEA: 0
DYSURIA: 0
BREAST MASS: 0
DIZZINESS: 0
PALPITATIONS: 0
SORE THROAT: 0
PARESTHESIAS: 0
DIARRHEA: 0

## 2021-11-09 NOTE — PATIENT INSTRUCTIONS
Check blood pressures at home:  Top number <140 and <90 on bottom is goal. If running high at home, please make a virtual visit.     Please schedule an eye exam. Schedule mammogram.    Continue  Working on getting exercise, and good nutrition! Will be in touch with your labs.     Follow up in 3 months for diabetes.     Preventive Health Recommendations  Female Ages 50 - 64    Yearly exam: See your health care provider every year in order to  o Review health changes.   o Discuss preventive care.    o Review your medicines if your doctor has prescribed any.      Get a Pap test every three years (unless you have an abnormal result and your provider advises testing more often).    If you get Pap tests with HPV test, you only need to test every 5 years, unless you have an abnormal result.     You do not need a Pap test if your uterus was removed (hysterectomy) and you have not had cancer.    You should be tested each year for STDs (sexually transmitted diseases) if you're at risk.     Have a mammogram every 1 to 2 years.    Have a colonoscopy at age 50, or have a yearly FIT test (stool test). These exams screen for colon cancer.      Have a cholesterol test every 5 years, or more often if advised.    Have a diabetes test (fasting glucose) every three years. If you are at risk for diabetes, you should have this test more often.     If you are at risk for osteoporosis (brittle bone disease), think about having a bone density scan (DEXA).    Shots: Get a flu shot each year. Get a tetanus shot every 10 years.    Nutrition:     Eat at least 5 servings of fruits and vegetables each day.    Eat whole-grain bread, whole-wheat pasta and brown rice instead of white grains and rice.    Get adequate Calcium and Vitamin D.     Lifestyle    Exercise at least 150 minutes a week (30 minutes a day, 5 days a week). This will help you control your weight and prevent disease.    Limit alcohol to one drink per day.    No smoking.     Wear  sunscreen to prevent skin cancer.     See your dentist every six months for an exam and cleaning.    See your eye doctor every 1 to 2 years.

## 2021-11-09 NOTE — PROGRESS NOTES
"   SUBJECTIVE:   CC: Inger A Wegener is an 61 year old woman who presents for preventive health visit.     Patient has been advised of split billing requirements and indicates understanding: Yes  Healthy Habits:     Getting at least 3 servings of Calcium per day:  Yes    Bi-annual eye exam:  Yes    Dental care twice a year:  Yes    Sleep apnea or symptoms of sleep apnea:  None    Diet:  Diabetic    Frequency of exercise:  None    Taking medications regularly:  Yes    Medication side effects:  Not applicable    PHQ-2 Total Score: 2    Additional concerns today:  No    62 yo F PMH HLD, T2DM, HTN.     T2DM - On metformin 1000 mg bid, simvastatin 20 mg daily. Last A1c 9% 1/22/21. States she is not very compliant. Does not want to check her blood sugars at home because has fear of needles. Last eye exam August 2019 at [facility]. She doesn't do any formal exercise but doing stairs at her work building. Walks in summer. Has seen Yash in the past, pharmacy. She has concerns about going on insulin in the future due to needle phobia.    Needs some time to get her life \"better managed\" before following up with diabetes education. She is interested in a CGM if insurance will cover.     HLD - She is not fasting. Taking simvastatin 20 mg daily.     HTN - On hydrochlorothiazide 25 mg daily. She has a cuff and can check at home. States she has anxiety around medical clinics and this may be why her blood pressure is high today.     H/o depression - On Effexor 112.5 mg XR daily. Restarted this in August. Has had episodes of depression in the past. At this time, she isn't depressed but has had a lot of anxiety. Works as a . Tons of stress regarding work. Feeling \"so much better\" since restarting effexor. Doesn't have a therapist at this time but interested.     Heartburn - controlled with Tums and Pepcid. No weight loss, vomiting, blood in stools, dysphagia.    H/o ankle fracture right, h/o right foot metatarsal " fractures - has fear of slipping in winter.     Last mammogram 7/19/19 and normal. Overdue.  Last colonoscopy 1/1/2011 and no history of abnormals. Overdue.     Family hx notable for colonic polyps in mother.     She has three dogs at home. She has two adult children. No grandchildren.       Today's PHQ-2 Score:   PHQ-2 ( 1999 Pfizer) 11/7/2021   Q1: Little interest or pleasure in doing things 1   Q2: Feeling down, depressed or hopeless 1   PHQ-2 Score 2   Q1: Little interest or pleasure in doing things Several days   Q2: Feeling down, depressed or hopeless Several days   PHQ-2 Score 2     Abuse: Current or Past (Physical, Sexual or Emotional) - No  Do you feel safe in your environment? Yes    Have you ever done Advance Care Planning? (For example, a Health Directive, POLST, or a discussion with a medical provider or your loved ones about your wishes): Yes, patient states has an Advance Care Planning document and will bring a copy to the clinic.    Social History     Tobacco Use     Smoking status: Passive Smoke Exposure - Never Smoker     Smokeless tobacco: Never Used   Substance Use Topics     Alcohol use: Yes       Alcohol Use 11/7/2021   Prescreen: >3 drinks/day or >7 drinks/week? No   Prescreen: >3 drinks/day or >7 drinks/week? -   AUDIT SCORE  -       Reviewed orders with patient.  Reviewed health maintenance and updated orders accordingly - Yes  Lab work is in process    Breast Cancer Screening:    Breast CA Risk Assessment (FHS-7) 11/7/2021   Do you have a family history of breast, colon, or ovarian cancer? No / Unknown       Pertinent mammograms are reviewed under the imaging tab.    History of abnormal Pap smear: NO - age 30-65 PAP every 5 years with negative HPV co-testing recommended  PAP / HPV Latest Ref Rng & Units 4/23/2019 12/29/2016 11/13/2013   PAP (Historical) - NIL NIL NIL   HPV16 NEG:Negative Negative Negative -   HPV18 NEG:Negative Negative Negative -   HRHPV NEG:Negative Negative Negative -  "    Reviewed and updated as needed this visit by clinical staff    Meds             Reviewed and updated as needed this visit by Provider             Review of Systems   Constitutional: Negative for chills and fever.   HENT: Positive for congestion and ear pain. Negative for hearing loss and sore throat.    Eyes: Negative for pain and visual disturbance.   Respiratory: Positive for cough. Negative for shortness of breath.    Cardiovascular: Negative for chest pain, palpitations and peripheral edema.   Gastrointestinal: Positive for heartburn. Negative for abdominal pain, constipation, diarrhea, hematochezia and nausea.   Breasts:  Negative for tenderness, breast mass and discharge.   Genitourinary: Negative for dysuria, frequency, genital sores, hematuria, pelvic pain, urgency, vaginal bleeding and vaginal discharge.   Musculoskeletal: Positive for myalgias. Negative for arthralgias and joint swelling.   Skin: Negative for rash.   Neurological: Negative for dizziness, weakness, headaches and paresthesias.   Psychiatric/Behavioral: Negative for mood changes. The patient is not nervous/anxious.         OBJECTIVE:   BP (!) 145/92   Pulse 98   Temp 97.9  F (36.6  C)   Resp 16   Ht 1.626 m (5' 4\")   Wt 75.8 kg (167 lb)   SpO2 98%   Breastfeeding No   BMI 28.67 kg/m    Physical Exam  GENERAL: healthy, alert and no distress  EYES: Eyes grossly normal to inspection, PERRL and conjunctivae and sclerae normal  HENT: ear canals and TM's normal, nose and mouth without ulcers or lesions  NECK: no adenopathy, no asymmetry, masses, or scars and thyroid normal to palpation  RESP: lungs clear to auscultation - no rales, rhonchi or wheezes  BREAST: normal without masses, tenderness or nipple discharge and no palpable axillary masses or adenopathy  CV: regular rate and rhythm, normal S1 S2, no S3 or S4, no murmur, click or rub, no peripheral edema and peripheral pulses strong  ABDOMEN: soft, nontender, no hepatosplenomegaly, " no masses and bowel sounds normal  MS: no gross musculoskeletal defects noted, no edema  SKIN: no suspicious lesions or rashes  NEURO: Normal strength and tone, mentation intact and speech normal  PSYCH: mentation appears normal, affect normal/bright  DIABETIC FOOT EXAM: normal bilaterally    Diagnostic Test Results:  Labs reviewed in Epic    ASSESSMENT/PLAN:   (Z00.00) Routine general medical examination at a health care facility  (primary encounter diagnosis)  Comment: Establishing care. We also reviewed other conditions as addressed below.  Plan: REVIEW OF HEALTH MAINTENANCE PROTOCOL ORDERS          (F43.22) Adjustment disorder with anxious mood  Comment: Mood better with medication but she is also wanting to establish with therapist. See HPI.   Plan: MENTAL HEALTH REFERRAL  - Adult; Outpatient         Treatment; Individual/Couples/Family/Group         Therapy/Health Psychology; Other: [facility]         Network [phone]; We will contact you to         schedule the appointment or please call with         any questions        Continue     (Z12.11) Screen for colon cancer  Comment: Elects cologuard after discussion of options. She has no contra indications to its use.   Plan: COLOGUARD(EXACT SCIENCES)         (Z23) High priority for 2019-nCoV vaccine  Comment: Booster given today.  Plan: COVID-19,PF,MODERNA (18+ Yrs BOOSTER .25mL)          (E11.65) Type 2 diabetes mellitus with hyperglycemia, without long-term current use of insulin (H)  Comment: Overdue for A1c. Will be in touch after her labs. Otherwise follow up in 3 months. Would add a GLP1 most likely if A1c is above goal.   Plan: Albumin Random Urine Quantitative with Creat         Ratio, Hemoglobin A1c, Comprehensive metabolic         panel (BMP + Alb, Alk Phos, ALT, AST, Total.         Bili, TP), CBC with platelets,         COVID-19,PF,MODERNA (18+ Yrs BOOSTER .25mL),         Continuous Blood Gluc  (FREESTYLE ANNA        14 DAY READER) RACHEL  "Continuous Blood Gluc         Sensor (FREESTYLE ANNA 14 DAY SENSOR) MISC,         FOOT EXAM          (E78.5) Hyperlipidemia with target LDL less than 130  Comment: Lipids today.   Plan: Continue statin.     (I10) Hypertension goal BP (blood pressure) < 140/90  Comment: Check home blood pressures.   Plan: See patient instructions.    (Z12.31) Encounter for screening mammogram for breast cancer  Comment: Due for screening.  Plan: *MA Screening Digital Bilateral            Patient has been advised of split billing requirements and indicates understanding: Yes  COUNSELING:  Reviewed preventive health counseling, as reflected in patient instructions    Estimated body mass index is 28.67 kg/m  as calculated from the following:    Height as of this encounter: 1.626 m (5' 4\").    Weight as of this encounter: 75.8 kg (167 lb).    Weight management plan: Discussed healthy diet and exercise guidelines    She reports that she is a non-smoker but has been exposed to tobacco smoke. She has been exposed to 0.00 packs per day for the past 0.00 years. She has never used smokeless tobacco.      Counseling Resources:  ATP IV Guidelines  Pooled Cohorts Equation Calculator  Breast Cancer Risk Calculator  BRCA-Related Cancer Risk Assessment: FHS-7 Tool  FRAX Risk Assessment  ICSI Preventive Guidelines  Dietary Guidelines for Americans, 2010  USDA's MyPlate  ASA Prophylaxis  Lung CA Screening    Kristi Griffith MD  [facility]    Addendum: Plan to recheck BMP in 1 week due to elevated creatinine. A1c above goal. Would like to discuss starting Victoza but recheck creatinine first. I have asked her to schedule a virtual visit.   " Location Indication Override (Is Already Calculated Based On Selected Body Location): Area H

## 2021-11-10 ENCOUNTER — OFFICE VISIT (OUTPATIENT)
Dept: FAMILY MEDICINE | Facility: CLINIC | Age: 61
End: 2021-11-10
Payer: COMMERCIAL

## 2021-11-10 VITALS
SYSTOLIC BLOOD PRESSURE: 145 MMHG | RESPIRATION RATE: 16 BRPM | OXYGEN SATURATION: 98 % | BODY MASS INDEX: 28.51 KG/M2 | HEIGHT: 64 IN | WEIGHT: 167 LBS | DIASTOLIC BLOOD PRESSURE: 92 MMHG | HEART RATE: 98 BPM | TEMPERATURE: 97.9 F

## 2021-11-10 DIAGNOSIS — F43.22 ADJUSTMENT DISORDER WITH ANXIOUS MOOD: ICD-10-CM

## 2021-11-10 DIAGNOSIS — Z00.00 ROUTINE GENERAL MEDICAL EXAMINATION AT A HEALTH CARE FACILITY: Primary | ICD-10-CM

## 2021-11-10 DIAGNOSIS — Z23 HIGH PRIORITY FOR 2019-NCOV VACCINE: ICD-10-CM

## 2021-11-10 DIAGNOSIS — Z12.11 SCREEN FOR COLON CANCER: ICD-10-CM

## 2021-11-10 DIAGNOSIS — Z12.31 ENCOUNTER FOR SCREENING MAMMOGRAM FOR BREAST CANCER: ICD-10-CM

## 2021-11-10 DIAGNOSIS — E78.5 HYPERLIPIDEMIA WITH TARGET LDL LESS THAN 130: ICD-10-CM

## 2021-11-10 DIAGNOSIS — E11.65 TYPE 2 DIABETES MELLITUS WITH HYPERGLYCEMIA, WITHOUT LONG-TERM CURRENT USE OF INSULIN (H): ICD-10-CM

## 2021-11-10 DIAGNOSIS — I10 HYPERTENSION GOAL BP (BLOOD PRESSURE) < 140/90: ICD-10-CM

## 2021-11-10 DIAGNOSIS — K21.9 GASTROESOPHAGEAL REFLUX DISEASE WITHOUT ESOPHAGITIS: ICD-10-CM

## 2021-11-10 LAB
ERYTHROCYTE [DISTWIDTH] IN BLOOD BY AUTOMATED COUNT: 12.1 % (ref 10–15)
HBA1C MFR BLD: 8.8 % (ref 0–5.6)
HCT VFR BLD AUTO: 42.7 % (ref 35–47)
HGB BLD-MCNC: 14.5 G/DL (ref 11.7–15.7)
MCH RBC QN AUTO: 29.5 PG (ref 26.5–33)
MCHC RBC AUTO-ENTMCNC: 34 G/DL (ref 31.5–36.5)
MCV RBC AUTO: 87 FL (ref 78–100)
PLATELET # BLD AUTO: 349 10E3/UL (ref 150–450)
RBC # BLD AUTO: 4.92 10E6/UL (ref 3.8–5.2)
WBC # BLD AUTO: 9 10E3/UL (ref 4–11)

## 2021-11-10 PROCEDURE — 83036 HEMOGLOBIN GLYCOSYLATED A1C: CPT | Performed by: STUDENT IN AN ORGANIZED HEALTH CARE EDUCATION/TRAINING PROGRAM

## 2021-11-10 PROCEDURE — 82043 UR ALBUMIN QUANTITATIVE: CPT | Performed by: STUDENT IN AN ORGANIZED HEALTH CARE EDUCATION/TRAINING PROGRAM

## 2021-11-10 PROCEDURE — 99207 PR FOOT EXAM NO CHARGE: CPT | Mod: 25 | Performed by: STUDENT IN AN ORGANIZED HEALTH CARE EDUCATION/TRAINING PROGRAM

## 2021-11-10 PROCEDURE — 85027 COMPLETE CBC AUTOMATED: CPT | Performed by: STUDENT IN AN ORGANIZED HEALTH CARE EDUCATION/TRAINING PROGRAM

## 2021-11-10 PROCEDURE — 0064A COVID-19,PF,MODERNA (18+ YRS BOOSTER .25ML): CPT | Performed by: STUDENT IN AN ORGANIZED HEALTH CARE EDUCATION/TRAINING PROGRAM

## 2021-11-10 PROCEDURE — 99396 PREV VISIT EST AGE 40-64: CPT | Mod: 25 | Performed by: STUDENT IN AN ORGANIZED HEALTH CARE EDUCATION/TRAINING PROGRAM

## 2021-11-10 PROCEDURE — 91306 COVID-19,PF,MODERNA (18+ YRS BOOSTER .25ML): CPT | Performed by: STUDENT IN AN ORGANIZED HEALTH CARE EDUCATION/TRAINING PROGRAM

## 2021-11-10 PROCEDURE — 36415 COLL VENOUS BLD VENIPUNCTURE: CPT | Performed by: STUDENT IN AN ORGANIZED HEALTH CARE EDUCATION/TRAINING PROGRAM

## 2021-11-10 PROCEDURE — 99213 OFFICE O/P EST LOW 20 MIN: CPT | Mod: 25 | Performed by: STUDENT IN AN ORGANIZED HEALTH CARE EDUCATION/TRAINING PROGRAM

## 2021-11-10 PROCEDURE — 80053 COMPREHEN METABOLIC PANEL: CPT | Performed by: STUDENT IN AN ORGANIZED HEALTH CARE EDUCATION/TRAINING PROGRAM

## 2021-11-10 RX ORDER — FAMOTIDINE 20 MG/1
20 TABLET, FILM COATED ORAL 2 TIMES DAILY PRN
Qty: 90 TABLET | Refills: 3 | Status: SHIPPED | OUTPATIENT
Start: 2021-11-10

## 2021-11-10 RX ORDER — SIMVASTATIN 20 MG
20 TABLET ORAL AT BEDTIME
Qty: 90 TABLET | Refills: 3 | Status: SHIPPED | OUTPATIENT
Start: 2021-11-10 | End: 2022-09-29

## 2021-11-10 RX ORDER — FLASH GLUCOSE SCANNING READER
1 EACH MISCELLANEOUS ONCE
Qty: 1 EACH | Refills: 0 | Status: SHIPPED | OUTPATIENT
Start: 2021-11-10 | End: 2021-11-10

## 2021-11-10 RX ORDER — HYDROCHLOROTHIAZIDE 25 MG/1
25 TABLET ORAL DAILY
Qty: 90 TABLET | Refills: 3 | Status: SHIPPED | OUTPATIENT
Start: 2021-11-10 | End: 2022-10-06

## 2021-11-10 RX ORDER — FLASH GLUCOSE SENSOR
1 KIT MISCELLANEOUS
Qty: 2 EACH | Refills: 5 | Status: SHIPPED | OUTPATIENT
Start: 2021-11-10 | End: 2023-05-10

## 2021-11-10 RX ORDER — METFORMIN HCL 500 MG
TABLET, EXTENDED RELEASE 24 HR ORAL
Qty: 360 TABLET | Refills: 3 | Status: SHIPPED | OUTPATIENT
Start: 2021-11-10 | End: 2022-10-06

## 2021-11-10 ASSESSMENT — MIFFLIN-ST. JEOR: SCORE: 1307.51

## 2021-11-11 LAB
ALBUMIN SERPL-MCNC: 4 G/DL (ref 3.4–5)
ALP SERPL-CCNC: 110 U/L (ref 40–150)
ALT SERPL W P-5'-P-CCNC: 54 U/L (ref 0–50)
ANION GAP SERPL CALCULATED.3IONS-SCNC: 10 MMOL/L (ref 3–14)
AST SERPL W P-5'-P-CCNC: 26 U/L (ref 0–45)
BILIRUB SERPL-MCNC: 0.4 MG/DL (ref 0.2–1.3)
BUN SERPL-MCNC: 17 MG/DL (ref 7–30)
CALCIUM SERPL-MCNC: 9.3 MG/DL (ref 8.5–10.1)
CHLORIDE BLD-SCNC: 100 MMOL/L (ref 94–109)
CO2 SERPL-SCNC: 24 MMOL/L (ref 20–32)
CREAT SERPL-MCNC: 1.15 MG/DL (ref 0.52–1.04)
CREAT UR-MCNC: 189 MG/DL
GFR SERPL CREATININE-BSD FRML MDRD: 51 ML/MIN/1.73M2
GLUCOSE BLD-MCNC: 177 MG/DL (ref 70–99)
MICROALBUMIN UR-MCNC: 37 MG/L
MICROALBUMIN/CREAT UR: 19.58 MG/G CR (ref 0–25)
POTASSIUM BLD-SCNC: 3.5 MMOL/L (ref 3.4–5.3)
PROT SERPL-MCNC: 7.4 G/DL (ref 6.8–8.8)
SODIUM SERPL-SCNC: 134 MMOL/L (ref 133–144)

## 2021-11-11 ASSESSMENT — PATIENT HEALTH QUESTIONNAIRE - PHQ9: SUM OF ALL RESPONSES TO PHQ QUESTIONS 1-9: 3

## 2021-11-13 ENCOUNTER — MYC MEDICAL ADVICE (OUTPATIENT)
Dept: FAMILY MEDICINE | Facility: CLINIC | Age: 61
End: 2021-11-13
Payer: COMMERCIAL

## 2021-11-13 DIAGNOSIS — E11.65 TYPE 2 DIABETES MELLITUS WITH HYPERGLYCEMIA, WITHOUT LONG-TERM CURRENT USE OF INSULIN (H): ICD-10-CM

## 2021-11-13 DIAGNOSIS — R07.81 RIB PAIN ON RIGHT SIDE: ICD-10-CM

## 2021-11-17 RX ORDER — METHOCARBAMOL 500 MG/1
500 TABLET, FILM COATED ORAL 3 TIMES DAILY
Qty: 30 TABLET | Refills: 1 | OUTPATIENT
Start: 2021-11-17

## 2021-11-17 RX ORDER — BLOOD SUGAR DIAGNOSTIC
STRIP MISCELLANEOUS
Qty: 100 STRIP | Refills: 3 | Status: SHIPPED | OUTPATIENT
Start: 2021-11-17 | End: 2023-08-15

## 2021-11-17 NOTE — PROGRESS NOTES
This encounter was created solely for the purpose of releasing the future order that was placed for Cologuard.  This is a necessary step in order for the results to be abstracted once they are available.  Litzy Yan

## 2021-11-17 NOTE — TELEPHONE ENCOUNTER
Test strips refilled.  Dr. Griffith's instruction and request relayed to pt via [employer].    Jyotsna Perkins RN  [facility]

## 2021-11-17 NOTE — TELEPHONE ENCOUNTER
Dr. Griffith-Please review and advise:  1. Patient reports taking Acetaminophen 1500 mg up to TID, which would be a daily dose of 4500 mg   A. Do you recommend maximum daily dose of Acetaminophen 3000 mg?  2. Test strips pended-they were marked as patient not taking so writer unable to authorize refill-pended  3. Patient requests Methocarbamol refill if you agree it is an alternative to Acetaminophen-pended   A. Last refilled 8/3/20, #30, 1 refill    Thank you!  RITO MarcanoN, RN  [facility]

## 2021-11-17 NOTE — TELEPHONE ENCOUNTER
Agree max dose of Tylenol is 3 grams per day.     If she needs a refill of methocarbamol, I would like her to have a visit for that. Phone visit is fine. Sounds like we need to talk about ongoing back pain?    Kristi Griffith MD  [facility]  [phone]

## 2021-11-28 LAB — COLOGUARD-ABSTRACT: NEGATIVE

## 2021-12-03 ENCOUNTER — LAB (OUTPATIENT)
Dept: LAB | Facility: CLINIC | Age: 61
End: 2021-12-03
Payer: COMMERCIAL

## 2021-12-03 DIAGNOSIS — E11.65 TYPE 2 DIABETES MELLITUS WITH HYPERGLYCEMIA, WITHOUT LONG-TERM CURRENT USE OF INSULIN (H): ICD-10-CM

## 2021-12-03 PROCEDURE — 36415 COLL VENOUS BLD VENIPUNCTURE: CPT

## 2021-12-03 PROCEDURE — 80048 BASIC METABOLIC PNL TOTAL CA: CPT

## 2021-12-04 LAB
ANION GAP SERPL CALCULATED.3IONS-SCNC: 5 MMOL/L (ref 3–14)
BUN SERPL-MCNC: 16 MG/DL (ref 7–30)
CALCIUM SERPL-MCNC: 9 MG/DL (ref 8.5–10.1)
CHLORIDE BLD-SCNC: 103 MMOL/L (ref 94–109)
CO2 SERPL-SCNC: 27 MMOL/L (ref 20–32)
CREAT SERPL-MCNC: 0.68 MG/DL (ref 0.52–1.04)
GFR SERPL CREATININE-BSD FRML MDRD: >90 ML/MIN/1.73M2
GLUCOSE BLD-MCNC: 186 MG/DL (ref 70–99)
POTASSIUM BLD-SCNC: 3.5 MMOL/L (ref 3.4–5.3)
SODIUM SERPL-SCNC: 135 MMOL/L (ref 133–144)

## 2021-12-04 NOTE — RESULT ENCOUNTER NOTE
Luis Manuel Lindquist,    Your kidney test is normal. Let us know if you have questions.    Kristi Griffith MD  [facility]  [phone]

## 2021-12-08 NOTE — RESULT ENCOUNTER NOTE
Luis Manuel Lindquist,    Your cologuard was normal. Let's recheck in 3 years.    Kristi Griffith MD  [facility]  [phone]

## 2022-05-15 ENCOUNTER — HEALTH MAINTENANCE LETTER (OUTPATIENT)
Age: 62
End: 2022-05-15

## 2022-05-30 DIAGNOSIS — F43.23 ADJUSTMENT DISORDER WITH MIXED ANXIETY AND DEPRESSED MOOD: ICD-10-CM

## 2022-06-01 ENCOUNTER — MYC MEDICAL ADVICE (OUTPATIENT)
Dept: FAMILY MEDICINE | Facility: CLINIC | Age: 62
End: 2022-06-01
Payer: COMMERCIAL

## 2022-06-01 DIAGNOSIS — F43.23 ADJUSTMENT DISORDER WITH MIXED ANXIETY AND DEPRESSED MOOD: ICD-10-CM

## 2022-06-01 RX ORDER — VENLAFAXINE HYDROCHLORIDE 37.5 MG/1
CAPSULE, EXTENDED RELEASE ORAL
Qty: 270 CAPSULE | Refills: 3 | OUTPATIENT
Start: 2022-06-01

## 2022-06-01 NOTE — TELEPHONE ENCOUNTER
Message sent to pharmacy - Refusal reason: Patient has requested refill too soon.  Camron GONZALES

## 2022-06-03 RX ORDER — VENLAFAXINE HYDROCHLORIDE 37.5 MG/1
112.5 CAPSULE, EXTENDED RELEASE ORAL DAILY
Qty: 270 CAPSULE | Refills: 0 | Status: SHIPPED | OUTPATIENT
Start: 2022-06-03 | End: 2022-10-06

## 2022-07-05 ENCOUNTER — ANCILLARY PROCEDURE (OUTPATIENT)
Dept: MAMMOGRAPHY | Facility: CLINIC | Age: 62
End: 2022-07-05
Payer: COMMERCIAL

## 2022-07-05 DIAGNOSIS — Z12.31 ENCOUNTER FOR SCREENING MAMMOGRAM FOR BREAST CANCER: ICD-10-CM

## 2022-07-05 PROCEDURE — 77067 SCR MAMMO BI INCL CAD: CPT | Mod: TC | Performed by: RADIOLOGY

## 2022-07-26 ENCOUNTER — TRANSFERRED RECORDS (OUTPATIENT)
Dept: FAMILY MEDICINE | Facility: CLINIC | Age: 62
End: 2022-07-26

## 2022-07-26 LAB — RETINOPATHY: NEGATIVE

## 2022-09-10 ENCOUNTER — HEALTH MAINTENANCE LETTER (OUTPATIENT)
Age: 62
End: 2022-09-10

## 2022-10-06 ENCOUNTER — OFFICE VISIT (OUTPATIENT)
Dept: FAMILY MEDICINE | Facility: CLINIC | Age: 62
End: 2022-10-06
Payer: COMMERCIAL

## 2022-10-06 VITALS
WEIGHT: 169 LBS | TEMPERATURE: 97 F | RESPIRATION RATE: 16 BRPM | BODY MASS INDEX: 29.01 KG/M2 | SYSTOLIC BLOOD PRESSURE: 136 MMHG | OXYGEN SATURATION: 98 % | DIASTOLIC BLOOD PRESSURE: 84 MMHG | HEART RATE: 86 BPM

## 2022-10-06 DIAGNOSIS — F43.23 ADJUSTMENT DISORDER WITH MIXED ANXIETY AND DEPRESSED MOOD: ICD-10-CM

## 2022-10-06 DIAGNOSIS — Z13.220 SCREENING FOR HYPERLIPIDEMIA: ICD-10-CM

## 2022-10-06 DIAGNOSIS — I10 HYPERTENSION GOAL BP (BLOOD PRESSURE) < 140/90: ICD-10-CM

## 2022-10-06 DIAGNOSIS — E11.65 TYPE 2 DIABETES MELLITUS WITH HYPERGLYCEMIA, WITHOUT LONG-TERM CURRENT USE OF INSULIN (H): Primary | ICD-10-CM

## 2022-10-06 DIAGNOSIS — E78.5 HYPERLIPIDEMIA WITH TARGET LDL LESS THAN 130: ICD-10-CM

## 2022-10-06 LAB
HBA1C MFR BLD: 9.3 % (ref 0–5.6)
VIT B12 SERPL-MCNC: 330 PG/ML (ref 232–1245)

## 2022-10-06 PROCEDURE — 36415 COLL VENOUS BLD VENIPUNCTURE: CPT | Performed by: STUDENT IN AN ORGANIZED HEALTH CARE EDUCATION/TRAINING PROGRAM

## 2022-10-06 PROCEDURE — 82607 VITAMIN B-12: CPT | Performed by: STUDENT IN AN ORGANIZED HEALTH CARE EDUCATION/TRAINING PROGRAM

## 2022-10-06 PROCEDURE — 99214 OFFICE O/P EST MOD 30 MIN: CPT | Mod: 25 | Performed by: STUDENT IN AN ORGANIZED HEALTH CARE EDUCATION/TRAINING PROGRAM

## 2022-10-06 PROCEDURE — 83036 HEMOGLOBIN GLYCOSYLATED A1C: CPT | Performed by: STUDENT IN AN ORGANIZED HEALTH CARE EDUCATION/TRAINING PROGRAM

## 2022-10-06 PROCEDURE — 96127 BRIEF EMOTIONAL/BEHAV ASSMT: CPT | Performed by: STUDENT IN AN ORGANIZED HEALTH CARE EDUCATION/TRAINING PROGRAM

## 2022-10-06 PROCEDURE — 80048 BASIC METABOLIC PNL TOTAL CA: CPT | Performed by: STUDENT IN AN ORGANIZED HEALTH CARE EDUCATION/TRAINING PROGRAM

## 2022-10-06 PROCEDURE — 80061 LIPID PANEL: CPT | Performed by: STUDENT IN AN ORGANIZED HEALTH CARE EDUCATION/TRAINING PROGRAM

## 2022-10-06 PROCEDURE — 90471 IMMUNIZATION ADMIN: CPT | Performed by: STUDENT IN AN ORGANIZED HEALTH CARE EDUCATION/TRAINING PROGRAM

## 2022-10-06 PROCEDURE — 90682 RIV4 VACC RECOMBINANT DNA IM: CPT | Performed by: STUDENT IN AN ORGANIZED HEALTH CARE EDUCATION/TRAINING PROGRAM

## 2022-10-06 PROCEDURE — 82043 UR ALBUMIN QUANTITATIVE: CPT | Performed by: STUDENT IN AN ORGANIZED HEALTH CARE EDUCATION/TRAINING PROGRAM

## 2022-10-06 RX ORDER — METFORMIN HCL 500 MG
TABLET, EXTENDED RELEASE 24 HR ORAL
Qty: 360 TABLET | Refills: 3 | Status: SHIPPED | OUTPATIENT
Start: 2022-10-06 | End: 2023-11-15

## 2022-10-06 RX ORDER — HYDROCHLOROTHIAZIDE 25 MG/1
25 TABLET ORAL DAILY
Qty: 90 TABLET | Refills: 3 | Status: SHIPPED | OUTPATIENT
Start: 2022-10-06 | End: 2023-01-18

## 2022-10-06 RX ORDER — SIMVASTATIN 20 MG
20 TABLET ORAL AT BEDTIME
Qty: 90 TABLET | Refills: 3 | Status: SHIPPED | OUTPATIENT
Start: 2022-10-06 | End: 2023-11-15

## 2022-10-06 RX ORDER — VENLAFAXINE HYDROCHLORIDE 37.5 MG/1
75 CAPSULE, EXTENDED RELEASE ORAL DAILY
Qty: 180 CAPSULE | Refills: 1 | Status: SHIPPED | OUTPATIENT
Start: 2022-10-06 | End: 2023-05-31

## 2022-10-06 ASSESSMENT — ANXIETY QUESTIONNAIRES
GAD7 TOTAL SCORE: 3
7. FEELING AFRAID AS IF SOMETHING AWFUL MIGHT HAPPEN: NOT AT ALL
4. TROUBLE RELAXING: SEVERAL DAYS
1. FEELING NERVOUS, ANXIOUS, OR ON EDGE: NOT AT ALL
6. BECOMING EASILY ANNOYED OR IRRITABLE: SEVERAL DAYS
5. BEING SO RESTLESS THAT IT IS HARD TO SIT STILL: NOT AT ALL
GAD7 TOTAL SCORE: 3
IF YOU CHECKED OFF ANY PROBLEMS ON THIS QUESTIONNAIRE, HOW DIFFICULT HAVE THESE PROBLEMS MADE IT FOR YOU TO DO YOUR WORK, TAKE CARE OF THINGS AT HOME, OR GET ALONG WITH OTHER PEOPLE: NOT DIFFICULT AT ALL
3. WORRYING TOO MUCH ABOUT DIFFERENT THINGS: SEVERAL DAYS
GAD7 TOTAL SCORE: 3
2. NOT BEING ABLE TO STOP OR CONTROL WORRYING: NOT AT ALL
7. FEELING AFRAID AS IF SOMETHING AWFUL MIGHT HAPPEN: NOT AT ALL
8. IF YOU CHECKED OFF ANY PROBLEMS, HOW DIFFICULT HAVE THESE MADE IT FOR YOU TO DO YOUR WORK, TAKE CARE OF THINGS AT HOME, OR GET ALONG WITH OTHER PEOPLE?: NOT DIFFICULT AT ALL

## 2022-10-06 ASSESSMENT — PATIENT HEALTH QUESTIONNAIRE - PHQ9
SUM OF ALL RESPONSES TO PHQ QUESTIONS 1-9: 3
10. IF YOU CHECKED OFF ANY PROBLEMS, HOW DIFFICULT HAVE THESE PROBLEMS MADE IT FOR YOU TO DO YOUR WORK, TAKE CARE OF THINGS AT HOME, OR GET ALONG WITH OTHER PEOPLE: NOT DIFFICULT AT ALL
SUM OF ALL RESPONSES TO PHQ QUESTIONS 1-9: 3

## 2022-10-06 NOTE — PROGRESS NOTES
b12   Assessment & Plan     Type 2 diabetes mellitus with hyperglycemia, without long-term current use of insulin (H)  Diabetes is uncontrolled. Continue metformin and start Trulicity. We discussed contraindications to drug use and potential side effects. Drug handout was provided. She is a candidate for use of GLP1. Follow up in 3 months for lab recheck with new provider.   - HEMOGLOBIN A1C  - simvastatin (ZOCOR) 20 MG tablet  Dispense: 90 tablet; Refill: 3  - metFORMIN (GLUCOPHAGE XR) 500 MG 24 hr tablet  Dispense: 360 tablet; Refill: 3  - Vitamin B12  - HEMOGLOBIN A1C  - Vitamin B12  - dulaglutide (TRULICITY) 0.75 MG/0.5ML pen  Dispense: 3 mL; Refill: 1    Screening for hyperlipidemia  Due for lipids.   - Lipid panel reflex to direct LDL Non-fasting  - Lipid panel reflex to direct LDL Non-fasting    Adjustment disorder with mixed anxiety and depressed mood  Stable on Effexor. We will try reducing her dose to from 112 mg to 75 mg. Follow up in 3 months.   - venlafaxine (EFFEXOR XR) 37.5 MG 24 hr capsule  Dispense: 180 capsule; Refill: 1    Hyperlipidemia with target LDL less than 130  Stable.   - simvastatin (ZOCOR) 20 MG tablet  Dispense: 90 tablet; Refill: 3    Hypertension goal BP (blood pressure) < 140/90  Controlled. Labs drawn today. Continue hydrochlorothiazide 25 mg per day.   - hydrochlorothiazide (HYDRODIURIL) 25 MG tablet  Dispense: 90 tablet; Refill: 3  - Basic metabolic panel  (Ca, Cl, CO2, Creat, Gluc, K, Na, BUN)  - Albumin Random Urine Quantitative with Creat Ratio  - Basic metabolic panel  (Ca, Cl, CO2, Creat, Gluc, K, Na, BUN)  - Albumin Random Urine Quantitative with Creat Ratio    Kristi Griffith MD  [facility]    Kasey Lindquist is a 62 year old, presenting for the following health issues:  Labs Only      History of Present Illness       Mental Health Follow-up:  Patient presents to follow-up on Depression & Anxiety.Patient's depression since last visit has  been:  No change  The patient is not having other symptoms associated with depression.  Patient's anxiety since last visit has been:  Better  The patient is not having other symptoms associated with anxiety.  Any significant life events: No  Patient is not feeling anxious or having panic attacks.  Patient has no concerns about alcohol or drug use.    Diabetes:   She presents for follow up of diabetes.  She is checking home blood glucose a few times a month. She checks blood glucose before and after meals and at bedtime.  Blood glucose is sometimes over 200 and never under 70. She is aware of hypoglycemia symptoms including shakiness and weakness. She is concerned about blood sugar frequently over 200. She is not experiencing numbness or burning in feet, excessive thirst, blurry vision, weight changes or redness, sores or blisters on feet.         She eats 2-3 servings of fruits and vegetables daily.She consumes 1 sweetened beverage(s) daily.She exercises with enough effort to increase her heart rate 10 to 19 minutes per day.  She exercises with enough effort to increase her heart rate 5 days per week.   She is taking medications regularly.    Today's PHQ-9         PHQ-9 Total Score: 3    PHQ-9 Q9 Thoughts of better off dead/self-harm past 2 weeks :   Not at all    How difficult have these problems made it for you to do your work, take care of things at home, or get along with other people: Not difficult at all  Today's DORIS-7 Score: 3     Chronic conditions update:    T2DM - Her last A1c was 8.8% on 11/10/21. I had suggested Victoza after that time although she was unable to follow up with me until now. She is currently on metformin 1000 mg bid, simvastatin 20 mg daily. She doesn't feel that she has made major lifestyle changes since the last check. She tried the mack for 2 weeks but didn't find it super helpful. She is afraid of needles so has a hard time checking sugars. Has not checked BGs recently.      HLD -  Taking simvastatin 20 mg daily.      HTN - On hydrochlorothiazide 25 mg daily. She has a cuff and can check at home. States she has anxiety around medical clinics and this may be why her blood pressure is high today. She has been checking home blood presures at bedtime. These are is the high 120s/70s-130s/80s. No chest pain or pressure, dyspnea with exertion, leg swelling.      H/o depression - On Effexor 112.5 mg XR daily. Has had episodes of depression in the past. Works as a . She had a change in job placement, which had a major positive impact on her mental health. Work demands are less stressful. Doesn't have a therapist. Feels that mood has been stable for the past year and a half or so.        DORIS-7 SCORE 4/10/2015 2/11/2019 10/6/2022   Total Score 1 - -   Total Score - - 3 (minimal anxiety)   Total Score - 0 3       PHQ 10/27/2020 11/10/2021 10/6/2022   PHQ-9 Total Score 2 3 3   Q9: Thoughts of better off dead/self-harm past 2 weeks Not at all Not at all Not at all         Objective    /84 (BP Location: Right arm, Patient Position: Chair, Cuff Size: Adult Regular)   Pulse 86   Temp 97  F (36.1  C) (Temporal)   Resp 16   Wt 76.7 kg (169 lb)   LMP  (LMP Unknown)   SpO2 98%   BMI 29.01 kg/m    Body mass index is 29.01 kg/m .  Physical Exam   GENERAL: healthy, alert and no distress  EYES: Eyes grossly normal to inspection, PERRL and conjunctivae and sclerae normal  NECK: no adenopathy, no asymmetry, masses, or scars and thyroid normal to palpation  RESP: lungs clear to auscultation - no rales, rhonchi or wheezes  CV: regular rate and rhythm, normal S1 S2, no S3 or S4, no murmur, click or rub, no peripheral edema and peripheral pulses strong  MS: no gross musculoskeletal defects noted, no edema    Results for orders placed or performed in visit on 10/06/22   HEMOGLOBIN A1C     Status: Abnormal   Result Value Ref Range    Hemoglobin A1C 9.3 (H) 0.0 - 5.6 %

## 2022-10-06 NOTE — NURSING NOTE
Prior to immunization administration, verified patients identity using patient s name and date of birth. Please see Immunization Activity for additional information.     Screening Questionnaire for Adult Immunization    Are you sick today?   No   Do you have allergies to medications, food, a vaccine component or latex?   No   Have you ever had a serious reaction after receiving a vaccination?   No   Do you have a long-term health problem with heart, lung, kidney, or metabolic disease (e.g., diabetes), asthma, a blood disorder, no spleen, complement component deficiency, a cochlear implant, or a spinal fluid leak?  Are you on long-term aspirin therapy?   Yes   Do you have cancer, leukemia, HIV/AIDS, or any other immune system problem?   No   Do you have a parent, brother, or sister with an immune system problem?   No   In the past 3 months, have you taken medications that affect  your immune system, such as prednisone, other steroids, or anticancer drugs; drugs for the treatment of rheumatoid arthritis, Crohn s disease, or psoriasis; or have you had radiation treatments?   No   Have you had a seizure, or a brain or other nervous system problem?   No   During the past year, have you received a transfusion of blood or blood    products, or been given immune (gamma) globulin or antiviral drug?   No   For women: Are you pregnant or is there a chance you could become       pregnant during the next month?   No   Have you received any vaccinations in the past 4 weeks?   No     Immunization questionnaire answers were all negative.        Per orders of Dr. dorman, injection of flublok given by Victoria Rainey. Patient instructed to remain in clinic for 15 minutes afterwards, and to report any adverse reaction to me immediately.      Victoria Rainey on 10/6/2022 at 4:44 PM     Screening performed by Victoria Rainey on 10/6/2022 at 4:43 PM.     Medical Necessity Information: It is in your best interest to select a reason for this procedure from the list below. All of these items fulfill various CMS LCD requirements except the new and changing color options. Post-Care Instructions: I reviewed with the patient in detail post-care instructions. Patient is to wear sunprotection, and avoid picking at any of the treated lesions. Pt may apply Vaseline to crusted or scabbing areas. Render Note In Bullet Format When Appropriate: No Medical Necessity Clause: This procedure was medically necessary because the lesions that were treated were: Consent: The patient's consent was obtained including but not limited to risks of crusting, scabbing, blistering, scarring, darker or lighter pigmentary change, recurrence, incomplete removal and infection. Detail Level: Detailed

## 2022-10-06 NOTE — PATIENT INSTRUCTIONS
Darryl Mandel, JOSUE Brennan NP    Start Trulicity once per week and please follow up in 3 months. Continue to work on lifestyle changes to improve the diabetes.    Decrease by one tab of Effexor to a new dose of 75 mg per day. Let us know by phone call to triage if you run into any troubles.    Nice to see you!

## 2022-10-07 LAB
ANION GAP SERPL CALCULATED.3IONS-SCNC: 5 MMOL/L (ref 3–14)
BUN SERPL-MCNC: 15 MG/DL (ref 7–30)
CALCIUM SERPL-MCNC: 9.3 MG/DL (ref 8.5–10.1)
CHLORIDE BLD-SCNC: 100 MMOL/L (ref 94–109)
CHOLEST SERPL-MCNC: 166 MG/DL
CO2 SERPL-SCNC: 30 MMOL/L (ref 20–32)
CREAT SERPL-MCNC: 0.72 MG/DL (ref 0.52–1.04)
CREAT UR-MCNC: 119 MG/DL
FASTING STATUS PATIENT QL REPORTED: NO
GFR SERPL CREATININE-BSD FRML MDRD: >90 ML/MIN/1.73M2
GLUCOSE BLD-MCNC: 218 MG/DL (ref 70–99)
HDLC SERPL-MCNC: 35 MG/DL
LDLC SERPL CALC-MCNC: 69 MG/DL
MICROALBUMIN UR-MCNC: 34 MG/L
MICROALBUMIN/CREAT UR: 28.57 MG/G CR (ref 0–25)
NONHDLC SERPL-MCNC: 131 MG/DL
POTASSIUM BLD-SCNC: 3.7 MMOL/L (ref 3.4–5.3)
SODIUM SERPL-SCNC: 135 MMOL/L (ref 133–144)
TRIGL SERPL-MCNC: 310 MG/DL

## 2022-12-05 DIAGNOSIS — E11.65 TYPE 2 DIABETES MELLITUS WITH HYPERGLYCEMIA, WITHOUT LONG-TERM CURRENT USE OF INSULIN (H): ICD-10-CM

## 2022-12-07 RX ORDER — DULAGLUTIDE 0.75 MG/.5ML
INJECTION, SOLUTION SUBCUTANEOUS
Qty: 3 ML | Refills: 0 | Status: SHIPPED | OUTPATIENT
Start: 2022-12-07 | End: 2023-01-17

## 2022-12-07 NOTE — TELEPHONE ENCOUNTER
Prescription approved per [facility] Refill Protocol.  Future appt 1/17/23  AYANA Perkins RN  [facility]

## 2023-01-17 ENCOUNTER — OFFICE VISIT (OUTPATIENT)
Dept: FAMILY MEDICINE | Facility: CLINIC | Age: 63
End: 2023-01-17
Payer: COMMERCIAL

## 2023-01-17 VITALS
SYSTOLIC BLOOD PRESSURE: 148 MMHG | BODY MASS INDEX: 29.59 KG/M2 | DIASTOLIC BLOOD PRESSURE: 87 MMHG | RESPIRATION RATE: 20 BRPM | HEIGHT: 63 IN | TEMPERATURE: 97 F | HEART RATE: 82 BPM | WEIGHT: 167 LBS | OXYGEN SATURATION: 98 %

## 2023-01-17 DIAGNOSIS — I10 HYPERTENSION GOAL BP (BLOOD PRESSURE) < 140/90: ICD-10-CM

## 2023-01-17 DIAGNOSIS — E11.65 TYPE 2 DIABETES MELLITUS WITH HYPERGLYCEMIA, WITHOUT LONG-TERM CURRENT USE OF INSULIN (H): Primary | ICD-10-CM

## 2023-01-17 DIAGNOSIS — Z23 ENCOUNTER FOR VACCINATION: ICD-10-CM

## 2023-01-17 DIAGNOSIS — E53.8 VITAMIN B12 DEFICIENCY (NON ANEMIC): ICD-10-CM

## 2023-01-17 LAB
HBA1C MFR BLD: 7.3 % (ref 0–5.6)
VIT B12 SERPL-MCNC: 428 PG/ML (ref 232–1245)

## 2023-01-17 PROCEDURE — 90677 PCV20 VACCINE IM: CPT | Performed by: FAMILY MEDICINE

## 2023-01-17 PROCEDURE — 90471 IMMUNIZATION ADMIN: CPT | Performed by: FAMILY MEDICINE

## 2023-01-17 PROCEDURE — 90472 IMMUNIZATION ADMIN EACH ADD: CPT | Performed by: FAMILY MEDICINE

## 2023-01-17 PROCEDURE — 90750 HZV VACC RECOMBINANT IM: CPT | Performed by: FAMILY MEDICINE

## 2023-01-17 PROCEDURE — 0134A COVID-19 VACCINE BIVALENT BOOSTER 18+ (MODERNA): CPT | Performed by: FAMILY MEDICINE

## 2023-01-17 PROCEDURE — 83036 HEMOGLOBIN GLYCOSYLATED A1C: CPT | Performed by: FAMILY MEDICINE

## 2023-01-17 PROCEDURE — 91313 COVID-19 VACCINE BIVALENT BOOSTER 18+ (MODERNA): CPT | Performed by: FAMILY MEDICINE

## 2023-01-17 PROCEDURE — 80053 COMPREHEN METABOLIC PANEL: CPT | Performed by: FAMILY MEDICINE

## 2023-01-17 PROCEDURE — 82607 VITAMIN B-12: CPT | Performed by: FAMILY MEDICINE

## 2023-01-17 PROCEDURE — 99214 OFFICE O/P EST MOD 30 MIN: CPT | Mod: 25 | Performed by: FAMILY MEDICINE

## 2023-01-17 PROCEDURE — 36415 COLL VENOUS BLD VENIPUNCTURE: CPT | Performed by: FAMILY MEDICINE

## 2023-01-17 RX ORDER — DULAGLUTIDE 0.75 MG/.5ML
0.75 INJECTION, SOLUTION SUBCUTANEOUS
Qty: 3 ML | Refills: 3 | Status: SHIPPED | OUTPATIENT
Start: 2023-01-17 | End: 2023-05-10

## 2023-01-17 ASSESSMENT — PATIENT HEALTH QUESTIONNAIRE - PHQ9
SUM OF ALL RESPONSES TO PHQ QUESTIONS 1-9: 2
10. IF YOU CHECKED OFF ANY PROBLEMS, HOW DIFFICULT HAVE THESE PROBLEMS MADE IT FOR YOU TO DO YOUR WORK, TAKE CARE OF THINGS AT HOME, OR GET ALONG WITH OTHER PEOPLE: NOT DIFFICULT AT ALL
SUM OF ALL RESPONSES TO PHQ QUESTIONS 1-9: 2

## 2023-01-17 ASSESSMENT — ANXIETY QUESTIONNAIRES
2. NOT BEING ABLE TO STOP OR CONTROL WORRYING: NOT AT ALL
GAD7 TOTAL SCORE: 2
3. WORRYING TOO MUCH ABOUT DIFFERENT THINGS: NOT AT ALL
GAD7 TOTAL SCORE: 2
7. FEELING AFRAID AS IF SOMETHING AWFUL MIGHT HAPPEN: NOT AT ALL
IF YOU CHECKED OFF ANY PROBLEMS ON THIS QUESTIONNAIRE, HOW DIFFICULT HAVE THESE PROBLEMS MADE IT FOR YOU TO DO YOUR WORK, TAKE CARE OF THINGS AT HOME, OR GET ALONG WITH OTHER PEOPLE: NOT DIFFICULT AT ALL
GAD7 TOTAL SCORE: 2
5. BEING SO RESTLESS THAT IT IS HARD TO SIT STILL: NOT AT ALL
8. IF YOU CHECKED OFF ANY PROBLEMS, HOW DIFFICULT HAVE THESE MADE IT FOR YOU TO DO YOUR WORK, TAKE CARE OF THINGS AT HOME, OR GET ALONG WITH OTHER PEOPLE?: NOT DIFFICULT AT ALL
1. FEELING NERVOUS, ANXIOUS, OR ON EDGE: SEVERAL DAYS
4. TROUBLE RELAXING: NOT AT ALL
6. BECOMING EASILY ANNOYED OR IRRITABLE: SEVERAL DAYS
7. FEELING AFRAID AS IF SOMETHING AWFUL MIGHT HAPPEN: NOT AT ALL

## 2023-01-17 NOTE — PROGRESS NOTES
Assessment & Plan     Type 2 diabetes mellitus with hyperglycemia, without long-term current use of insulin (H)  -Uncontrolled, due for A1c check today  - Comprehensive metabolic panel  - Hemoglobin A1c  - dulaglutide (TRULICITY) 0.75 MG/0.5ML pen  Dispense: 3 mL; Refill: 3  -Follow-up in 3 months    Vitamin B12 deficiency (non anemic)  -On B12 supplement, due for lab monitoring  - Vitamin B12    Encounter for vaccination  - Pneumococcal 20 Valent Conjugate (Prevnar 20)  - COVID-19 VACCINE BIVALENT BOOSTER 18+ (MODERNA)  - ZOSTER VACCINE RECOMBINANT ADJUVANTED (SHINGRIX)    Hypertension goal BP (blood pressure) < 140/90  -Elevated on recheck  -Plan to increase hydrochlorothiazide to 50 mg daily  - hydrochlorothiazide (HYDRODIURIL) 50 MG tablet  Dispense: 90 tablet; Refill: 1        DO DAINA Wagoner [facility]    Kasey Lindquist is a 62 year old, presenting for the following health issues:  Follow Up (Diabetes/) and Consult    Started on Trulicity in Oct.  Does finger sticks and CGM. Morning blood sugars 150-200.    On hydrochlorothiazide for blood pressure.    Started to taper off of venlafaxine, currently on 75 mg daily. Mood okay, still sad occasionally.      BP Readings from Last 6 Encounters:   01/17/23 (!) 148/87   10/06/22 136/84   11/10/21 (!) 145/92   10/27/20 138/82   09/22/20 136/84   01/31/20 138/80     History of Present Illness       Mental Health Follow-up:  Patient presents to follow-up on Depression & Anxiety.Patient's depression since last visit has been:  Good  The patient is not having other symptoms associated with depression.  Patient's anxiety since last visit has been:  Better  The patient is not having other symptoms associated with anxiety.  Any significant life events: No  Patient is not feeling anxious or having panic attacks.  Patient has no concerns about alcohol or drug use.    Diabetes:   She presents for follow up of diabetes.  She is checking home  "blood glucose a few times a week. She checks blood glucose at bedtime.  Blood glucose is sometimes over 200 and never under 70. She is aware of hypoglycemia symptoms including shakiness, dizziness and weakness. She is concerned about blood sugar frequently over 200. She is not experiencing numbness or burning in feet, excessive thirst, blurry vision, weight changes or redness, sores or blisters on feet.         Hypertension: She presents for follow up of hypertension.  She does not check blood pressure  regularly outside of the clinic. Outpatient blood pressures have not been over 140/90. She does not follow a low salt diet.      Today's PHQ-9         PHQ-9 Total Score: 2    PHQ-9 Q9 Thoughts of better off dead/self-harm past 2 weeks :   Not at all    How difficult have these problems made it for you to do your work, take care of things at home, or get along with other people: Not difficult at all  Today's DORIS-7 Score: 2       Review of Systems         Objective    BP (!) 148/87 (BP Location: Left arm, Patient Position: Sitting, Cuff Size: Adult Regular)   Pulse 82   Temp 97  F (36.1  C) (Temporal)   Resp 20   Ht 1.597 m (5' 2.87\")   Wt 75.8 kg (167 lb)   LMP  (LMP Unknown)   SpO2 98%   BMI 29.70 kg/m    Body mass index is 29.7 kg/m .  Physical Exam   GENERAL: healthy, alert and no distress  RESP: lungs clear to auscultation - no rales, rhonchi or wheezes  CV: regular rate and rhythm, normal S1 S2, no S3 or S4, no murmur, click or rub, no peripheral edema and peripheral pulses strong  PSYCH: mentation appears normal, affect normal/bright            "

## 2023-01-17 NOTE — NURSING NOTE
Prior to immunization administration, verified patients identity using patient s name and date of birth. Please see Immunization Activity for additional information.     Screening Questionnaire for Adult Immunization    Are you sick today?   No   Do you have allergies to medications, food, a vaccine component or latex?   No   Have you ever had a serious reaction after receiving a vaccination?   No   Do you have a long-term health problem with heart, lung, kidney, or metabolic disease (e.g., diabetes), asthma, a blood disorder, no spleen, complement component deficiency, a cochlear implant, or a spinal fluid leak?  Are you on long-term aspirin therapy?   Yes   Do you have cancer, leukemia, HIV/AIDS, or any other immune system problem?   No   Do you have a parent, brother, or sister with an immune system problem?   No   In the past 3 months, have you taken medications that affect  your immune system, such as prednisone, other steroids, or anticancer drugs; drugs for the treatment of rheumatoid arthritis, Crohn s disease, or psoriasis; or have you had radiation treatments?   No   Have you had a seizure, or a brain or other nervous system problem?   No   During the past year, have you received a transfusion of blood or blood    products, or been given immune (gamma) globulin or antiviral drug?   No   For women: Are you pregnant or is there a chance you could become       pregnant during the next month?   No   Have you received any vaccinations in the past 4 weeks?   No     Immunization questionnaire was positive for at least one answer.  Notified queenie.        Per orders of Dr. jerome, injection of moderna, [id number] and shingrix given by Any Fung MA. Patient instructed to remain in clinic for 15 minutes afterwards, and to report any adverse reaction to me immediately.       Screening performed by Any Fung MA on 1/17/2023 at 4:22 PM.

## 2023-01-18 LAB
ALBUMIN SERPL BCG-MCNC: 4.8 G/DL (ref 3.5–5.2)
ALP SERPL-CCNC: 87 U/L (ref 35–104)
ALT SERPL W P-5'-P-CCNC: 32 U/L (ref 10–35)
ANION GAP SERPL CALCULATED.3IONS-SCNC: 17 MMOL/L (ref 7–15)
AST SERPL W P-5'-P-CCNC: 23 U/L (ref 10–35)
BILIRUB SERPL-MCNC: 0.3 MG/DL
BUN SERPL-MCNC: 14 MG/DL (ref 8–23)
CALCIUM SERPL-MCNC: 10 MG/DL (ref 8.8–10.2)
CHLORIDE SERPL-SCNC: 96 MMOL/L (ref 98–107)
CREAT SERPL-MCNC: 0.73 MG/DL (ref 0.51–0.95)
DEPRECATED HCO3 PLAS-SCNC: 23 MMOL/L (ref 22–29)
GFR SERPL CREATININE-BSD FRML MDRD: >90 ML/MIN/1.73M2
GLUCOSE SERPL-MCNC: 110 MG/DL (ref 70–99)
POTASSIUM SERPL-SCNC: 3.4 MMOL/L (ref 3.4–5.3)
PROT SERPL-MCNC: 7.3 G/DL (ref 6.4–8.3)
SODIUM SERPL-SCNC: 136 MMOL/L (ref 136–145)

## 2023-01-18 RX ORDER — HYDROCHLOROTHIAZIDE 50 MG/1
50 TABLET ORAL DAILY
Qty: 90 TABLET | Refills: 1 | Status: SHIPPED | OUTPATIENT
Start: 2023-01-18 | End: 2023-11-14

## 2023-01-21 ENCOUNTER — HEALTH MAINTENANCE LETTER (OUTPATIENT)
Age: 63
End: 2023-01-21

## 2023-03-02 ENCOUNTER — MYC MEDICAL ADVICE (OUTPATIENT)
Dept: FAMILY MEDICINE | Facility: CLINIC | Age: 63
End: 2023-03-02
Payer: COMMERCIAL

## 2023-03-02 DIAGNOSIS — E11.65 TYPE 2 DIABETES MELLITUS WITH HYPERGLYCEMIA, WITHOUT LONG-TERM CURRENT USE OF INSULIN (H): ICD-10-CM

## 2023-04-30 ENCOUNTER — HEALTH MAINTENANCE LETTER (OUTPATIENT)
Age: 63
End: 2023-04-30

## 2023-05-10 ENCOUNTER — TELEPHONE (OUTPATIENT)
Dept: FAMILY MEDICINE | Facility: CLINIC | Age: 63
End: 2023-05-10

## 2023-05-10 ENCOUNTER — OFFICE VISIT (OUTPATIENT)
Dept: FAMILY MEDICINE | Facility: CLINIC | Age: 63
End: 2023-05-10
Attending: FAMILY MEDICINE
Payer: COMMERCIAL

## 2023-05-10 VITALS
BODY MASS INDEX: 28.17 KG/M2 | TEMPERATURE: 97 F | HEART RATE: 85 BPM | DIASTOLIC BLOOD PRESSURE: 85 MMHG | WEIGHT: 165 LBS | OXYGEN SATURATION: 99 % | SYSTOLIC BLOOD PRESSURE: 133 MMHG | HEIGHT: 64 IN | RESPIRATION RATE: 15 BRPM

## 2023-05-10 DIAGNOSIS — F41.1 GENERALIZED ANXIETY DISORDER: ICD-10-CM

## 2023-05-10 DIAGNOSIS — E11.9 TYPE 2 DIABETES MELLITUS WITHOUT COMPLICATION, WITHOUT LONG-TERM CURRENT USE OF INSULIN (H): Primary | ICD-10-CM

## 2023-05-10 DIAGNOSIS — E11.65 TYPE 2 DIABETES MELLITUS WITH HYPERGLYCEMIA, WITHOUT LONG-TERM CURRENT USE OF INSULIN (H): Primary | ICD-10-CM

## 2023-05-10 DIAGNOSIS — Z86.16 HISTORY OF COVID-19: ICD-10-CM

## 2023-05-10 LAB — HBA1C MFR BLD: 7.3 % (ref 0–5.6)

## 2023-05-10 PROCEDURE — 83036 HEMOGLOBIN GLYCOSYLATED A1C: CPT | Performed by: FAMILY MEDICINE

## 2023-05-10 PROCEDURE — 99214 OFFICE O/P EST MOD 30 MIN: CPT | Performed by: FAMILY MEDICINE

## 2023-05-10 PROCEDURE — 36415 COLL VENOUS BLD VENIPUNCTURE: CPT | Performed by: FAMILY MEDICINE

## 2023-05-10 RX ORDER — DULAGLUTIDE 0.75 MG/.5ML
0.75 INJECTION, SOLUTION SUBCUTANEOUS
Qty: 9 ML | Refills: 3 | Status: SHIPPED | OUTPATIENT
Start: 2023-05-10 | End: 2023-12-19 | Stop reason: DRUGHIGH

## 2023-05-10 RX ORDER — FLASH GLUCOSE SENSOR
1 KIT MISCELLANEOUS
Qty: 2 EACH | Refills: 5 | Status: SHIPPED | OUTPATIENT
Start: 2023-05-10 | End: 2024-09-11

## 2023-05-10 ASSESSMENT — ANXIETY QUESTIONNAIRES
6. BECOMING EASILY ANNOYED OR IRRITABLE: SEVERAL DAYS
5. BEING SO RESTLESS THAT IT IS HARD TO SIT STILL: NOT AT ALL
1. FEELING NERVOUS, ANXIOUS, OR ON EDGE: NOT AT ALL
2. NOT BEING ABLE TO STOP OR CONTROL WORRYING: NOT AT ALL
GAD7 TOTAL SCORE: 2
7. FEELING AFRAID AS IF SOMETHING AWFUL MIGHT HAPPEN: NOT AT ALL
GAD7 TOTAL SCORE: 2
GAD7 TOTAL SCORE: 2
3. WORRYING TOO MUCH ABOUT DIFFERENT THINGS: NOT AT ALL
4. TROUBLE RELAXING: SEVERAL DAYS
IF YOU CHECKED OFF ANY PROBLEMS ON THIS QUESTIONNAIRE, HOW DIFFICULT HAVE THESE PROBLEMS MADE IT FOR YOU TO DO YOUR WORK, TAKE CARE OF THINGS AT HOME, OR GET ALONG WITH OTHER PEOPLE: SOMEWHAT DIFFICULT
7. FEELING AFRAID AS IF SOMETHING AWFUL MIGHT HAPPEN: NOT AT ALL
8. IF YOU CHECKED OFF ANY PROBLEMS, HOW DIFFICULT HAVE THESE MADE IT FOR YOU TO DO YOUR WORK, TAKE CARE OF THINGS AT HOME, OR GET ALONG WITH OTHER PEOPLE?: SOMEWHAT DIFFICULT

## 2023-05-10 ASSESSMENT — PAIN SCALES - GENERAL: PAINLEVEL: NO PAIN (0)

## 2023-05-10 NOTE — PROGRESS NOTES
Assessment & Plan     Type 2 diabetes mellitus without complication, without long-term current use of insulin (H)  -A1c 7.3 today, good control. No changes to medications today.  - Hemoglobin A1c  - dulaglutide (TRULICITY) 0.75 MG/0.5ML pen  Dispense: 9 mL; Refill: 3  - Continuous Blood Gluc Sensor (FREESTYLE ANNA 14 DAY SENSOR) MISC  Dispense: 2 each; Refill: 5  -Follow-up in 6 months as scheduled for monitoring    Generalized anxiety disorder  -Stable with venlafaxine    History of COVID-19  -15 days out. Lingering cough but otherwise doing well.      Boris Mandel [facility]    Kasey Lindquist is a 62 year old, presenting for the following health issues:  Diabetes        5/10/2023     3:18 PM   Additional Questions   Roomed by emiliano aguilar   Accompanied by none         5/10/2023     3:18 PM   Patient Reported Additional Medications   Patient reports taking the following new medications none     Pt here for follow-up on DM2.  Wears CGM two week on and two weeks off. Manually check BS during weeks off of CGM or when she feels lows.BS around 140-150. Has had an occasional low of 80.     Feels like her eating habits have changed since starting Trulicity. Eating less, not craving as much. No longer having side effects with Trulicity.    Had covid about 15 days ago. Had every possible symptoms. Taste and smell are coming back. Lingering cough. Otherwise feeling well.     Hx of anxiety. Takes venlafaxine, working well.     Wt Readings from Last 4 Encounters:   05/10/23 74.8 kg (165 lb)   01/17/23 75.8 kg (167 lb)   10/06/22 76.7 kg (169 lb)   11/10/21 75.8 kg (167 lb)     History of Present Illness       Mental Health Follow-up:  Patient presents to follow-up on Anxiety.    Patient's anxiety since last visit has been:  Good  The patient is not having other symptoms associated with anxiety.  Any significant life events: No  Patient is not feeling anxious or having panic attacks.  Patient  "has no concerns about alcohol or drug use.    Diabetes:   She presents for follow up of diabetes.  She is checking home blood glucose with a continuous glucose monitor.  She checks blood glucose before and after meals and at bedtime.  Blood glucose is never over 200 and never under 70. She is aware of hypoglycemia symptoms including shakiness, dizziness, weakness and confusion. She is concerned about other. She is not experiencing numbness or burning in feet, excessive thirst, blurry vision, weight changes or redness, sores or blisters on feet.         Hyperlipidemia:  She presents for follow up of hyperlipidemia.  She is taking medication to lower cholesterol. She is not having myalgia or other side effects to statin medications.    Hypertension: She presents for follow up of hypertension.  She does check blood pressure  regularly outside of the clinic. Outside blood pressures have been over 140/90. She does not follow a low salt diet.     She eats 2-3 servings of fruits and vegetables daily.She consumes 1 sweetened beverage(s) daily.She exercises with enough effort to increase her heart rate 20 to 29 minutes per day.    She is taking medications regularly.  Today's DORIS-7 Score: 2        pt is requesting that her Trulicity supplies be a 90 days and notify that her side effects is getting better as of now just itchy on the injection spot                Review of Systems         Objective    /85 (BP Location: Right arm, Patient Position: Chair, Cuff Size: Adult Regular)   Pulse 85   Temp 97  F (36.1  C) (Temporal)   Resp 15   Ht 1.613 m (5' 3.5\")   Wt 74.8 kg (165 lb)   LMP  (LMP Unknown)   SpO2 99%   BMI 28.77 kg/m    Body mass index is 28.77 kg/m .  Physical Exam   GENERAL: healthy, alert and no distress  RESP: breathing comfortably, no acute respiratory distress  NEURO: Normal strength and tone, mentation intact and speech normal  PSYCH: mentation appears normal, affect normal/bright            "

## 2023-05-10 NOTE — TELEPHONE ENCOUNTER
Prior Authorization Retail Medication Request    Medication/Dose: Freestyle Caprice 14 day sensor  ICD code (if different than what is on RX):    Previously Tried and Failed:   Rationale:      Insurance Name:  [employer]  Insurance ID:  [id number] BIN: [id number] PCN: [id number]      Pharmacy Information (if different than what is on RX)  Name:  [facility]  Phone:  [phone]    Kenyetta Benito  Pharmacy Technician   [facility]  [phone]

## 2023-05-17 NOTE — TELEPHONE ENCOUNTER
Central Prior Authorization Team   Phone: [phone]    PA Initiation    Medication: Freestyle Caprice 14 day sensor  Insurance Company: [employer] - Phone [phone] Fax [phone]  Pharmacy Filling the Rx: [facility] - [place], [place] - [address]  Filling Pharmacy Phone: [phone]  Filling Pharmacy Fax:    Start Date: 5/17/2023

## 2023-05-18 NOTE — TELEPHONE ENCOUNTER
PRIOR AUTHORIZATION DENIED    Medication: Freestyle Caprice 14 day sensor    Denial Date: 5/17/2023    Denial Rational:  Per insurance, medication is excluded from patient's benefit plan and will not be covered. Review and appeal are not available because of this exclusion.            Appeal Information: N/A

## 2023-05-18 NOTE — TELEPHONE ENCOUNTER
Dr. Mandel: Arnie Vasques is excluded from plan; pt will need alternative CGM ordered.    Jyotsna RUSSELL RN  [facility]

## 2023-05-22 RX ORDER — PROCHLORPERAZINE 25 MG/1
1 SUPPOSITORY RECTAL ONCE
Qty: 1 EACH | Refills: 0 | Status: SHIPPED | OUTPATIENT
Start: 2023-05-22 | End: 2023-05-22

## 2023-05-22 RX ORDER — PROCHLORPERAZINE 25 MG/1
1 SUPPOSITORY RECTAL
Qty: 3 EACH | Refills: 5 | Status: SHIPPED | OUTPATIENT
Start: 2023-05-22 | End: 2024-09-11

## 2023-05-22 RX ORDER — PROCHLORPERAZINE 25 MG/1
1 SUPPOSITORY RECTAL
Qty: 1 EACH | Refills: 1 | Status: SHIPPED | OUTPATIENT
Start: 2023-05-22 | End: 2024-09-11

## 2023-05-22 NOTE — TELEPHONE ENCOUNTER
Sent in script for Dexcom. Unsure if it will be covered. Pls inform patient.    Boris Mandel, DO

## 2023-05-23 NOTE — TELEPHONE ENCOUNTER
Left message to call back and ask to speak with an available triage nurse.    RITO MarcanoN, RN-BC  [facility]

## 2023-05-24 NOTE — TELEPHONE ENCOUNTER
Pt returning our call.  Informed Freestyle was denied and Dexcom was submitted for PA.  If this is also denied, pt will need to return to her Accu-Check system.  She states she has plenty of lancets but will need test strips and alcohol preps.    Jyotsna RUSSELL RN  [facility]

## 2023-05-29 DIAGNOSIS — F43.23 ADJUSTMENT DISORDER WITH MIXED ANXIETY AND DEPRESSED MOOD: ICD-10-CM

## 2023-05-31 RX ORDER — VENLAFAXINE HYDROCHLORIDE 75 MG/1
CAPSULE, EXTENDED RELEASE ORAL
Qty: 90 CAPSULE | Refills: 0 | Status: SHIPPED | OUTPATIENT
Start: 2023-05-31 | End: 2023-08-07

## 2023-05-31 NOTE — TELEPHONE ENCOUNTER
---Prescription approved per [facility] Refill Protocol.       Christine Beard RN BSN     [facility]        --Last visit:  5/10/2023 Tequ[place].      --Future Visit: 11/13/23 Tequ[place].        11/10/2021     5:00 PM 10/6/2022     3:31 PM 1/17/2023     3:41 PM   PHQ   PHQ-9 Total Score 3 3 2   Q9: Thoughts of better off dead/self-harm past 2 weeks Not at all Not at all Not at all

## 2023-08-06 DIAGNOSIS — F43.23 ADJUSTMENT DISORDER WITH MIXED ANXIETY AND DEPRESSED MOOD: ICD-10-CM

## 2023-08-06 NOTE — TELEPHONE ENCOUNTER
"Routing refill request to provider for review/approval because:  Phq 9    Last Written Prescription Date:  5/31/23  Last Fill Quantity: 90,  # refills: 0   Last office visit provider:  5/10/23     Requested Prescriptions   Pending Prescriptions Disp Refills    venlafaxine (EFFEXOR XR) 75 MG 24 hr capsule [Pharmacy Med Name: VENLAFAXINE HCL ER 75MG CP24] 90 capsule 0     Sig: TAKE ONE CAPSULE BY MOUTH ONCE DAILY       Serotonin-Norepinephrine Reuptake Inhibitors  Failed - 8/6/2023  8:06 AM        Failed - PHQ-9 score of less than 5 in past 6 months     Please review last PHQ-9 score.           Passed - Blood pressure under 140/90 in past 12 months     BP Readings from Last 3 Encounters:   05/10/23 133/85   01/17/23 (!) 148/87   10/06/22 136/84                 Passed - Medication is active on med list        Passed - Patient is age 18 or older        Passed - No active pregnancy on record        Passed - Normal serum creatinine on file in past 12 months     Recent Labs   Lab Test 01/17/23  1631   CR 0.73       Ok to refill medication if creatinine is low          Passed - No positive pregnancy test in past 12 months        Passed - Recent (6 mo) or future (30 days) visit within the authorizing provider's specialty     Patient had office visit in the last 6 months or has a visit in the next 30 days with authorizing provider or within the authorizing provider's specialty.  See \"Patient Info\" tab in inbasket, or \"Choose Columns\" in Meds & Orders section of the refill encounter.                 Kristina Solomon RN 08/06/23 5:17 PM  "

## 2023-08-07 RX ORDER — VENLAFAXINE HYDROCHLORIDE 75 MG/1
CAPSULE, EXTENDED RELEASE ORAL
Qty: 90 CAPSULE | Refills: 1 | Status: SHIPPED | OUTPATIENT
Start: 2023-08-07 | End: 2023-12-19

## 2023-08-14 ENCOUNTER — ANCILLARY PROCEDURE (OUTPATIENT)
Dept: MAMMOGRAPHY | Facility: CLINIC | Age: 63
End: 2023-08-14
Attending: FAMILY MEDICINE
Payer: COMMERCIAL

## 2023-08-14 ENCOUNTER — MYC MEDICAL ADVICE (OUTPATIENT)
Dept: FAMILY MEDICINE | Facility: CLINIC | Age: 63
End: 2023-08-14

## 2023-08-14 DIAGNOSIS — Z12.31 VISIT FOR SCREENING MAMMOGRAM: ICD-10-CM

## 2023-08-14 DIAGNOSIS — E11.65 TYPE 2 DIABETES MELLITUS WITH HYPERGLYCEMIA, WITHOUT LONG-TERM CURRENT USE OF INSULIN (H): ICD-10-CM

## 2023-08-14 PROCEDURE — 77067 SCR MAMMO BI INCL CAD: CPT | Mod: TC | Performed by: RADIOLOGY

## 2023-08-15 RX ORDER — BLOOD SUGAR DIAGNOSTIC
STRIP MISCELLANEOUS
Qty: 100 STRIP | Refills: 1 | Status: SHIPPED | OUTPATIENT
Start: 2023-08-15

## 2023-08-15 NOTE — TELEPHONE ENCOUNTER
Authorized per nursing refill protocol    Dionna Marshall, BSN RN  [facility]

## 2023-10-01 ENCOUNTER — OFFICE VISIT (OUTPATIENT)
Dept: URGENT CARE | Facility: URGENT CARE | Age: 63
End: 2023-10-01
Payer: COMMERCIAL

## 2023-10-01 ENCOUNTER — HEALTH MAINTENANCE LETTER (OUTPATIENT)
Age: 63
End: 2023-10-01

## 2023-10-01 VITALS
RESPIRATION RATE: 16 BRPM | TEMPERATURE: 97.4 F | SYSTOLIC BLOOD PRESSURE: 128 MMHG | DIASTOLIC BLOOD PRESSURE: 74 MMHG | HEART RATE: 85 BPM | HEIGHT: 62 IN | BODY MASS INDEX: 31.28 KG/M2 | WEIGHT: 170 LBS | OXYGEN SATURATION: 98 %

## 2023-10-01 DIAGNOSIS — H00.012 HORDEOLUM EXTERNUM OF RIGHT LOWER EYELID: Primary | ICD-10-CM

## 2023-10-01 PROCEDURE — 99213 OFFICE O/P EST LOW 20 MIN: CPT | Performed by: FAMILY MEDICINE

## 2023-10-01 NOTE — PROGRESS NOTES
"SUBJECTIVE:   Inger A Wegener is a 63 year old female presenting with a chief complaint of lump on the lower right eyelid x 5 days.  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  Noticed a few more lumps develop since initial one came up.  No eyeball pain or vision changes.  Last night had some drainage.   No fevers.       OBJECTIVE  /74   Pulse 85   Temp 97.4  F (36.3  C) (Temporal)   Resp 16   Ht 1.575 m (5' 2\")   Wt 77.1 kg (170 lb)   LMP  (LMP Unknown)   SpO2 98%   BMI 31.09 kg/m    GENERAL:  Awake, alert and interactive. No acute distress.  EYES:  conjunctiva clear BL.   Right lower eyelid margin with 4 small lumps in medial 1/2 c/w hordeolums.  No surrounding erythema.   No drainage at this time.      ASSESSMENT/PLAN    ICD-10-CM    1. Hordeolum externum of right lower eyelid  H00.012 tobramycin (TOBREX) 0.3 % ophthalmic ointment        Anticipate resolution/improving over next week or so.   We discussed the expected course of the infection, medication, and symptomatic cares in detail.   Advised to return to care if symptoms not improving as expected, do not resolve completely, or if any new or worsening symptoms develop.        "

## 2023-11-14 DIAGNOSIS — E78.5 HYPERLIPIDEMIA WITH TARGET LDL LESS THAN 130: ICD-10-CM

## 2023-11-14 DIAGNOSIS — E11.65 TYPE 2 DIABETES MELLITUS WITH HYPERGLYCEMIA, WITHOUT LONG-TERM CURRENT USE OF INSULIN (H): ICD-10-CM

## 2023-11-14 DIAGNOSIS — I10 HYPERTENSION GOAL BP (BLOOD PRESSURE) < 140/90: ICD-10-CM

## 2023-11-15 RX ORDER — HYDROCHLOROTHIAZIDE 50 MG/1
50 TABLET ORAL DAILY
Qty: 90 TABLET | Refills: 0 | Status: SHIPPED | OUTPATIENT
Start: 2023-11-15 | End: 2023-12-19

## 2023-11-15 RX ORDER — SIMVASTATIN 20 MG
20 TABLET ORAL AT BEDTIME
Qty: 90 TABLET | Refills: 0 | Status: SHIPPED | OUTPATIENT
Start: 2023-11-15 | End: 2023-12-19

## 2023-11-15 RX ORDER — METFORMIN HCL 500 MG
TABLET, EXTENDED RELEASE 24 HR ORAL
Qty: 360 TABLET | Refills: 0 | Status: SHIPPED | OUTPATIENT
Start: 2023-11-15 | End: 2023-12-19

## 2023-11-15 NOTE — TELEPHONE ENCOUNTER
Patient down to last couple days. Knows she had a visit this past Monday that she had to cancel, but she is rescheduled for next avail in Dec-

## 2023-12-19 ENCOUNTER — OFFICE VISIT (OUTPATIENT)
Dept: FAMILY MEDICINE | Facility: CLINIC | Age: 63
End: 2023-12-19
Payer: COMMERCIAL

## 2023-12-19 VITALS
HEART RATE: 84 BPM | RESPIRATION RATE: 16 BRPM | BODY MASS INDEX: 31.43 KG/M2 | WEIGHT: 170.8 LBS | DIASTOLIC BLOOD PRESSURE: 82 MMHG | OXYGEN SATURATION: 97 % | SYSTOLIC BLOOD PRESSURE: 124 MMHG | HEIGHT: 62 IN | TEMPERATURE: 97.7 F

## 2023-12-19 DIAGNOSIS — I10 HYPERTENSION GOAL BP (BLOOD PRESSURE) < 140/90: ICD-10-CM

## 2023-12-19 DIAGNOSIS — E11.65 TYPE 2 DIABETES MELLITUS WITH HYPERGLYCEMIA, WITHOUT LONG-TERM CURRENT USE OF INSULIN (H): Primary | ICD-10-CM

## 2023-12-19 DIAGNOSIS — E78.5 HYPERLIPIDEMIA WITH TARGET LDL LESS THAN 130: ICD-10-CM

## 2023-12-19 DIAGNOSIS — Z23 ENCOUNTER FOR VACCINATION: ICD-10-CM

## 2023-12-19 DIAGNOSIS — F43.23 ADJUSTMENT DISORDER WITH MIXED ANXIETY AND DEPRESSED MOOD: ICD-10-CM

## 2023-12-19 LAB
ALBUMIN SERPL BCG-MCNC: 4.5 G/DL (ref 3.5–5.2)
ALP SERPL-CCNC: 84 U/L (ref 40–150)
ALT SERPL W P-5'-P-CCNC: 26 U/L (ref 0–50)
ANION GAP SERPL CALCULATED.3IONS-SCNC: 13 MMOL/L (ref 7–15)
AST SERPL W P-5'-P-CCNC: 22 U/L (ref 0–45)
BILIRUB SERPL-MCNC: 0.2 MG/DL
BUN SERPL-MCNC: 16.9 MG/DL (ref 8–23)
CALCIUM SERPL-MCNC: 9.9 MG/DL (ref 8.8–10.2)
CHLORIDE SERPL-SCNC: 96 MMOL/L (ref 98–107)
CHOLEST SERPL-MCNC: 168 MG/DL
CREAT SERPL-MCNC: 0.85 MG/DL (ref 0.51–0.95)
DEPRECATED HCO3 PLAS-SCNC: 29 MMOL/L (ref 22–29)
EGFRCR SERPLBLD CKD-EPI 2021: 77 ML/MIN/1.73M2
FASTING STATUS PATIENT QL REPORTED: ABNORMAL
GLUCOSE SERPL-MCNC: 166 MG/DL (ref 70–99)
HBA1C MFR BLD: 8.2 % (ref 0–5.6)
HDLC SERPL-MCNC: 41 MG/DL
LDLC SERPL CALC-MCNC: 84 MG/DL
NONHDLC SERPL-MCNC: 127 MG/DL
POTASSIUM SERPL-SCNC: 3.6 MMOL/L (ref 3.4–5.3)
PROT SERPL-MCNC: 7.2 G/DL (ref 6.4–8.3)
SODIUM SERPL-SCNC: 138 MMOL/L (ref 135–145)
TRIGL SERPL-MCNC: 215 MG/DL

## 2023-12-19 PROCEDURE — 90678 RSV VACC PREF BIVALENT IM: CPT | Performed by: FAMILY MEDICINE

## 2023-12-19 PROCEDURE — 90472 IMMUNIZATION ADMIN EACH ADD: CPT | Performed by: FAMILY MEDICINE

## 2023-12-19 PROCEDURE — 90471 IMMUNIZATION ADMIN: CPT | Performed by: FAMILY MEDICINE

## 2023-12-19 PROCEDURE — 36415 COLL VENOUS BLD VENIPUNCTURE: CPT | Performed by: FAMILY MEDICINE

## 2023-12-19 PROCEDURE — 91320 SARSCV2 VAC 30MCG TRS-SUC IM: CPT | Performed by: FAMILY MEDICINE

## 2023-12-19 PROCEDURE — 99214 OFFICE O/P EST MOD 30 MIN: CPT | Mod: 25 | Performed by: FAMILY MEDICINE

## 2023-12-19 PROCEDURE — 80053 COMPREHEN METABOLIC PANEL: CPT | Performed by: FAMILY MEDICINE

## 2023-12-19 PROCEDURE — 90480 ADMN SARSCOV2 VAC 1/ONLY CMP: CPT | Performed by: FAMILY MEDICINE

## 2023-12-19 PROCEDURE — 80061 LIPID PANEL: CPT | Performed by: FAMILY MEDICINE

## 2023-12-19 PROCEDURE — 83036 HEMOGLOBIN GLYCOSYLATED A1C: CPT | Performed by: FAMILY MEDICINE

## 2023-12-19 PROCEDURE — 82043 UR ALBUMIN QUANTITATIVE: CPT | Performed by: FAMILY MEDICINE

## 2023-12-19 PROCEDURE — 82570 ASSAY OF URINE CREATININE: CPT | Performed by: FAMILY MEDICINE

## 2023-12-19 PROCEDURE — 90686 IIV4 VACC NO PRSV 0.5 ML IM: CPT | Performed by: FAMILY MEDICINE

## 2023-12-19 RX ORDER — VENLAFAXINE HYDROCHLORIDE 75 MG/1
75 CAPSULE, EXTENDED RELEASE ORAL DAILY
Qty: 90 CAPSULE | Refills: 3 | Status: SHIPPED | OUTPATIENT
Start: 2023-12-19

## 2023-12-19 RX ORDER — HYDROCHLOROTHIAZIDE 50 MG/1
50 TABLET ORAL DAILY
Qty: 90 TABLET | Refills: 3 | Status: SHIPPED | OUTPATIENT
Start: 2023-12-19

## 2023-12-19 RX ORDER — SIMVASTATIN 20 MG
20 TABLET ORAL AT BEDTIME
Qty: 90 TABLET | Refills: 3 | Status: SHIPPED | OUTPATIENT
Start: 2023-12-19

## 2023-12-19 RX ORDER — METFORMIN HCL 500 MG
TABLET, EXTENDED RELEASE 24 HR ORAL
Qty: 360 TABLET | Refills: 3 | Status: SHIPPED | OUTPATIENT
Start: 2023-12-19

## 2023-12-19 ASSESSMENT — ANXIETY QUESTIONNAIRES
3. WORRYING TOO MUCH ABOUT DIFFERENT THINGS: SEVERAL DAYS
2. NOT BEING ABLE TO STOP OR CONTROL WORRYING: NOT AT ALL
1. FEELING NERVOUS, ANXIOUS, OR ON EDGE: NOT AT ALL
4. TROUBLE RELAXING: SEVERAL DAYS
IF YOU CHECKED OFF ANY PROBLEMS ON THIS QUESTIONNAIRE, HOW DIFFICULT HAVE THESE PROBLEMS MADE IT FOR YOU TO DO YOUR WORK, TAKE CARE OF THINGS AT HOME, OR GET ALONG WITH OTHER PEOPLE: SOMEWHAT DIFFICULT
GAD7 TOTAL SCORE: 3
6. BECOMING EASILY ANNOYED OR IRRITABLE: SEVERAL DAYS
5. BEING SO RESTLESS THAT IT IS HARD TO SIT STILL: NOT AT ALL
7. FEELING AFRAID AS IF SOMETHING AWFUL MIGHT HAPPEN: NOT AT ALL
GAD7 TOTAL SCORE: 3

## 2023-12-19 ASSESSMENT — PATIENT HEALTH QUESTIONNAIRE - PHQ9
10. IF YOU CHECKED OFF ANY PROBLEMS, HOW DIFFICULT HAVE THESE PROBLEMS MADE IT FOR YOU TO DO YOUR WORK, TAKE CARE OF THINGS AT HOME, OR GET ALONG WITH OTHER PEOPLE: NOT DIFFICULT AT ALL
SUM OF ALL RESPONSES TO PHQ QUESTIONS 1-9: 1
SUM OF ALL RESPONSES TO PHQ QUESTIONS 1-9: 1

## 2023-12-19 NOTE — PROGRESS NOTES
Assessment & Plan     Type 2 diabetes mellitus with hyperglycemia, without long-term current use of insulin (H)  -A1c 8.2, not at goal of <7. Discussed increasing Trulicity to 1.5 mg. Check BS regularly, follow-up in 4 weeks to go over readings on increased dosage of Trulicity.   - Comprehensive metabolic panel  - Albumin Random Urine Quantitative with Creat Ratio  - Hemoglobin A1c  - simvastatin (ZOCOR) 20 MG tablet  Dispense: 90 tablet; Refill: 3  - metFORMIN (GLUCOPHAGE XR) 500 MG 24 hr tablet  Dispense: 360 tablet; Refill: 3  - dulaglutide (TRULICITY) 1.5 MG/0.5ML pen  Dispense: 12 mL; Refill: 1    Hyperlipidemia with target LDL less than 130  -stable  - Lipid panel reflex to direct LDL Non-fasting  - simvastatin (ZOCOR) 20 MG tablet  Dispense: 90 tablet; Refill: 3    Hypertension goal BP (blood pressure) < 140/90  -Well controlled  - hydrochlorothiazide (HYDRODIURIL) 50 MG tablet  Dispense: 90 tablet; Refill: 3    Adjustment disorder with mixed anxiety and depressed mood  -Stable  - venlafaxine (EFFEXOR XR) 75 MG 24 hr capsule  Dispense: 90 capsule; Refill: 3    Encounter for vaccination  - RSV VACCINE (ABRYSVO)  - COVID-19 12+ (2023-24) (PFIZER)  - INFLUENZA VACCINE >6 MONTHS (AFLURIA/FLUZONE)      Boris Mandel DO  [facility]    Kasey Lindquist is a 63 year old, presenting for the following health issues:  Follow Up      12/19/2023     3:02 PM   Additional Questions   Roomed by Maged   Accompanied by self     Finger sticks 2-3 times a week. Endorses that she tends to eat more unhealthy this time of the year. A1c 8.2 today from 7.3. Was out of her meds for about a week.    Still taking Venlafaxine for mood. Feels stable on it.     History of Present Illness       Mental Health Follow-up:  Patient presents to follow-up on Anxiety.    Patient's anxiety since last visit has been:  Medium  The patient is not having other symptoms associated with anxiety.  Any significant life events:  "No  Patient is not feeling anxious or having panic attacks.  Patient has no concerns about alcohol or drug use.    Diabetes:   She presents for follow up of diabetes.  She is checking home blood glucose a few times a month.   She checks blood glucose before and after meals.  Blood glucose is never over 200 and never under 70. She is aware of hypoglycemia symptoms including shakiness, dizziness and weakness.   She is concerned about other.    She is not experiencing numbness or burning in feet, excessive thirst, blurry vision, weight changes or redness, sores or blisters on feet. The patient has not had a diabetic eye exam in the last 12 months.          Hyperlipidemia:  She presents for follow up of hyperlipidemia.   She is taking medication to lower cholesterol. She is not having myalgia or other side effects to statin medications.    Hypertension: She presents for follow up of hypertension.  She does check blood pressure  regularly outside of the clinic. Outside blood pressures have been over 140/90. She does not follow a low salt diet.     She eats 2-3 servings of fruits and vegetables daily.She consumes 1 sweetened beverage(s) daily.She exercises with enough effort to increase her heart rate 10 to 19 minutes per day.  She exercises with enough effort to increase her heart rate 4 days per week.   She is taking medications regularly.                 Review of Systems         Objective    /82 (BP Location: Right arm, Patient Position: Sitting, Cuff Size: Adult Regular)   Pulse 84   Temp 97.7  F (36.5  C) (Temporal)   Resp 16   Ht 1.575 m (5' 2\")   Wt 77.5 kg (170 lb 12.8 oz)   LMP  (LMP Unknown)   SpO2 97%   BMI 31.24 kg/m    Body mass index is 31.24 kg/m .  Physical Exam   GENERAL: healthy, alert and no distress  PSYCH: mentation appears normal, affect normal/bright                      "

## 2023-12-19 NOTE — PROGRESS NOTES
Prior to immunization administration, verified patients identity using patient s name and date of birth. Please see Immunization Activity for additional information.     Screening Questionnaire for Adult Immunization    Are you sick today?   No   Do you have allergies to medications, food, a vaccine component or latex?   Yes, erythromycin   Have you ever had a serious reaction after receiving a vaccination?   No   Do you have a long-term health problem with heart, lung, kidney, or metabolic disease (e.g., diabetes), asthma, a blood disorder, no spleen, complement component deficiency, a cochlear implant, or a spinal fluid leak?  Are you on long-term aspirin therapy?   Yes, diabetes & HTN   Do you have cancer, leukemia, HIV/AIDS, or any other immune system problem?   No   Do you have a parent, brother, or sister with an immune system problem?   No   In the past 3 months, have you taken medications that affect  your immune system, such as prednisone, other steroids, or anticancer drugs; drugs for the treatment of rheumatoid arthritis, Crohn s disease, or psoriasis; or have you had radiation treatments?   No   Have you had a seizure, or a brain or other nervous system problem?   No   During the past year, have you received a transfusion of blood or blood    products, or been given immune (gamma) globulin or antiviral drug?   No   For women: Are you pregnant or is there a chance you could become       pregnant during the next month?   No   Have you received any vaccinations in the past 4 weeks?   No     Immunization questionnaire was positive for at least one answer.  Notified Dr. Mandel.      Patient instructed to remain in clinic for 15 minutes afterwards, and to report any adverse reactions.     Screening performed by Maged Mendosa RN on 12/19/2023 at 3:39 PM.

## 2023-12-20 LAB
CREAT UR-MCNC: 89.7 MG/DL
MICROALBUMIN UR-MCNC: <12 MG/L
MICROALBUMIN/CREAT UR: NORMAL MG/G{CREAT}

## 2023-12-20 NOTE — RESULT ENCOUNTER NOTE
Luis Manuel Lindquist,  Thanks for coming to clinic.  The clinician who ordered these tests is currently out of the office, but we wanted to let you know that your results have been reviewed and there are no urgent findings.      Your clinician will review your results upon their return and may get back to you with further information if needed.      Mariel Rojo MD / [facility]

## 2024-01-15 ASSESSMENT — ANXIETY QUESTIONNAIRES
2. NOT BEING ABLE TO STOP OR CONTROL WORRYING: NOT AT ALL
GAD7 TOTAL SCORE: 3
IF YOU CHECKED OFF ANY PROBLEMS ON THIS QUESTIONNAIRE, HOW DIFFICULT HAVE THESE PROBLEMS MADE IT FOR YOU TO DO YOUR WORK, TAKE CARE OF THINGS AT HOME, OR GET ALONG WITH OTHER PEOPLE: SOMEWHAT DIFFICULT
4. TROUBLE RELAXING: NOT AT ALL
6. BECOMING EASILY ANNOYED OR IRRITABLE: SEVERAL DAYS
5. BEING SO RESTLESS THAT IT IS HARD TO SIT STILL: NOT AT ALL
7. FEELING AFRAID AS IF SOMETHING AWFUL MIGHT HAPPEN: NOT AT ALL
8. IF YOU CHECKED OFF ANY PROBLEMS, HOW DIFFICULT HAVE THESE MADE IT FOR YOU TO DO YOUR WORK, TAKE CARE OF THINGS AT HOME, OR GET ALONG WITH OTHER PEOPLE?: SOMEWHAT DIFFICULT
1. FEELING NERVOUS, ANXIOUS, OR ON EDGE: SEVERAL DAYS
GAD7 TOTAL SCORE: 3
3. WORRYING TOO MUCH ABOUT DIFFERENT THINGS: SEVERAL DAYS
7. FEELING AFRAID AS IF SOMETHING AWFUL MIGHT HAPPEN: NOT AT ALL

## 2024-01-17 ENCOUNTER — VIRTUAL VISIT (OUTPATIENT)
Dept: FAMILY MEDICINE | Facility: CLINIC | Age: 64
End: 2024-01-17
Payer: COMMERCIAL

## 2024-01-17 DIAGNOSIS — E11.65 TYPE 2 DIABETES MELLITUS WITH HYPERGLYCEMIA, WITHOUT LONG-TERM CURRENT USE OF INSULIN (H): ICD-10-CM

## 2024-01-17 PROCEDURE — 99213 OFFICE O/P EST LOW 20 MIN: CPT | Mod: 95 | Performed by: FAMILY MEDICINE

## 2024-01-17 NOTE — PROGRESS NOTES
"Ameena is a 63 year old who is being evaluated via a billable video visit.      How would you like to obtain your AVS? MyChart  If the video visit is dropped, the invitation should be resent by: Text to cell phone: [phone]  Will anyone else be joining your video visit? No          Assessment & Plan     Type 2 diabetes mellitus with hyperglycemia, without long-term current use of insulin (H)  -BS improved with increase dose of Trulicity, continue for now  -Follow-up in two months for A1c and monitoring  -Continue to monitor BS 1-2 a day at least 3-4 times a week            BMI  Estimated body mass index is 31.24 kg/m  as calculated from the following:    Height as of 12/19/23: 1.575 m (5' 2\").    Weight as of 12/19/23: 77.5 kg (170 lb 12.8 oz).           Subjective   Ameena is a 63 year old, presenting for the following health issues:  Diabetes        1/17/2024     4:22 PM   Additional Questions   Roomed by Marychuy TOMLINSON     Increased Trulicity about 4 weeks.   Checking blood sugars about twice a day.  Admits to indulging more over winter break.  Has use CGM prior but insurance no longer covers it. Planning to talk to insurance about this.     History of Present Illness       Mental Health Follow-up:  Patient presents to follow-up on Anxiety.    Patient's anxiety since last visit has been:  Good  The patient is not having other symptoms associated with anxiety.  Any significant life events: No  Patient is not feeling anxious or having panic attacks.  Patient has no concerns about alcohol or drug use.    Diabetes:   She presents for follow up of diabetes.  She is checking home blood glucose two times daily.   She checks blood glucose before and after meals.  Blood glucose is sometimes over 200 and never under 70. She is aware of hypoglycemia symptoms including shakiness, dizziness, lethargy and confusion.   She is concerned about other.    She is not experiencing numbness or burning in feet, excessive thirst, blurry " "vision, weight changes or redness, sores or blisters on feet. The patient has not had a diabetic eye exam in the last 12 months.          Hyperlipidemia:  She presents for follow up of hyperlipidemia.   She is taking medication to lower cholesterol. She is not having myalgia or other side effects to statin medications.    Hypertension: She presents for follow up of hypertension.  She does not check blood pressure  regularly outside of the clinic. Outside blood pressures have been over 140/90. She does not follow a low salt diet.     She eats 2-3 servings of fruits and vegetables daily.She consumes 2 sweetened beverage(s) daily.She exercises with enough effort to increase her heart rate 9 or less minutes per day.  She exercises with enough effort to increase her heart rate 4 days per week.   She is taking medications regularly.                 Objective    Vitals - Patient Reported  Weight (Patient Reported): 78.5 kg (173 lb)  Height (Patient Reported): 157.5 cm (5' 2\")  BMI (Based on Pt Reported Ht/Wt): 31.64  Pain Score: No Pain (0)          Physical Exam   GENERAL: alert and no distress  EYES: Eyes grossly normal to inspection.  No discharge or erythema, or obvious scleral/conjunctival abnormalities.  RESP: No audible wheeze, cough, or visible cyanosis.    SKIN: Visible skin clear. No significant rash, abnormal pigmentation or lesions.  NEURO: Cranial nerves grossly intact.  Mentation and speech appropriate for age.  PSYCH: Appropriate affect, tone, and pace of words          Video-Visit Details    Type of service:  Video Visit     Originating Location (pt. Location): Home    Distant Location (provider location):  On-site  Platform used for Video Visit: Olaf  Signed Electronically by: Boris Mandel DO    "

## 2024-02-18 ENCOUNTER — HEALTH MAINTENANCE LETTER (OUTPATIENT)
Age: 64
End: 2024-02-18

## 2024-03-19 ENCOUNTER — OFFICE VISIT (OUTPATIENT)
Dept: FAMILY MEDICINE | Facility: CLINIC | Age: 64
End: 2024-03-19
Payer: COMMERCIAL

## 2024-03-19 VITALS
OXYGEN SATURATION: 98 % | HEART RATE: 86 BPM | BODY MASS INDEX: 30.2 KG/M2 | HEIGHT: 62 IN | RESPIRATION RATE: 14 BRPM | SYSTOLIC BLOOD PRESSURE: 128 MMHG | TEMPERATURE: 97.7 F | WEIGHT: 164.1 LBS | DIASTOLIC BLOOD PRESSURE: 74 MMHG

## 2024-03-19 DIAGNOSIS — E11.9 TYPE 2 DIABETES MELLITUS WITHOUT COMPLICATION, WITHOUT LONG-TERM CURRENT USE OF INSULIN (H): Primary | ICD-10-CM

## 2024-03-19 LAB — HBA1C MFR BLD: 6.9 % (ref 0–5.6)

## 2024-03-19 PROCEDURE — 99213 OFFICE O/P EST LOW 20 MIN: CPT | Performed by: FAMILY MEDICINE

## 2024-03-19 PROCEDURE — 83036 HEMOGLOBIN GLYCOSYLATED A1C: CPT | Performed by: FAMILY MEDICINE

## 2024-03-19 PROCEDURE — 36415 COLL VENOUS BLD VENIPUNCTURE: CPT | Performed by: FAMILY MEDICINE

## 2024-03-19 ASSESSMENT — PAIN SCALES - GENERAL: PAINLEVEL: NO PAIN (0)

## 2024-03-19 NOTE — PROGRESS NOTES
Assessment & Plan     Type 2 diabetes mellitus without complication, without long-term current use of insulin (H)  -A1c 6.9, at goal  -Continue with Trulicity and metformin. Can try maricel chews or tea for nausea from Trulicity.   -Plan to follow-up in 6 months for monitoring  -Continue to check blood sugars intermittently  -Pt planning to have diabetic eye exam this summer  - Hemoglobin A1c    Subjective   Ameena is a 63 year old, presenting for the following health issues:  Diabetes        3/19/2024     3:54 PM   Additional Questions   Roomed by Rachel amin     History of Present Illness       Diabetes:   She presents for follow up of diabetes.  She is checking home blood glucose a few times a week.   She checks blood glucose before and after meals and at bedtime.  Blood glucose is never over 200 and never under 70. She is aware of hypoglycemia symptoms including shakiness, dizziness and confusion.   She is concerned about blood sugar frequently over 200.    She is not experiencing numbness or burning in feet, excessive thirst, blurry vision, weight changes or redness, sores or blisters on feet. The patient has not had a diabetic eye exam in the last 12 months.          She eats 2-3 servings of fruits and vegetables daily.She consumes 1 sweetened beverage(s) daily.She exercises with enough effort to increase her heart rate 20 to 29 minutes per day.  She exercises with enough effort to increase her heart rate 5 days per week.   She is taking medications regularly.     Here for DM2 follow-up.  Increased Trulicity about 3 months ago. Continues to have nausea, though it is improved. Did try zofran for nausea but did not like it. Notes occasional BS lows 80-90. Has lost weight on increase of Trulicity. Would like to continue with increased does of Trulicity.     Plans on retiring in 1 year. Works as a high school special . Feels like she can dedicate more time to chanign her diet and increasing her physical  "activity once she is retired.     Wt Readings from Last 4 Encounters:   03/19/24 74.4 kg (164 lb 1.6 oz)   12/19/23 77.5 kg (170 lb 12.8 oz)   10/01/23 77.1 kg (170 lb)   05/10/23 74.8 kg (165 lb)                    Objective    /74 (BP Location: Right arm, Patient Position: Sitting, Cuff Size: Adult Regular)   Pulse 86   Temp 97.7  F (36.5  C) (Temporal)   Resp 14   Ht 1.575 m (5' 2\")   Wt 74.4 kg (164 lb 1.6 oz)   LMP  (LMP Unknown)   SpO2 98%   BMI 30.01 kg/m    Body mass index is 30.01 kg/m .  Physical Exam   GENERAL: alert and no distress  PSYCH: mentation appears normal, affect normal/bright            Signed Electronically by: Boris Mandel DO    "

## 2024-07-07 ENCOUNTER — HEALTH MAINTENANCE LETTER (OUTPATIENT)
Age: 64
End: 2024-07-07

## 2024-09-11 ENCOUNTER — OFFICE VISIT (OUTPATIENT)
Dept: FAMILY MEDICINE | Facility: CLINIC | Age: 64
End: 2024-09-11
Payer: COMMERCIAL

## 2024-09-11 VITALS
OXYGEN SATURATION: 97 % | HEIGHT: 63 IN | DIASTOLIC BLOOD PRESSURE: 74 MMHG | HEART RATE: 102 BPM | SYSTOLIC BLOOD PRESSURE: 126 MMHG | TEMPERATURE: 97.9 F | WEIGHT: 156.5 LBS | RESPIRATION RATE: 16 BRPM | BODY MASS INDEX: 27.73 KG/M2

## 2024-09-11 DIAGNOSIS — Z23 ENCOUNTER FOR VACCINATION: ICD-10-CM

## 2024-09-11 DIAGNOSIS — Z00.00 ROUTINE GENERAL MEDICAL EXAMINATION AT A HEALTH CARE FACILITY: Primary | ICD-10-CM

## 2024-09-11 DIAGNOSIS — I10 HYPERTENSION GOAL BP (BLOOD PRESSURE) < 140/90: ICD-10-CM

## 2024-09-11 DIAGNOSIS — E78.5 HYPERLIPIDEMIA WITH TARGET LDL LESS THAN 130: ICD-10-CM

## 2024-09-11 DIAGNOSIS — E11.9 TYPE 2 DIABETES MELLITUS WITHOUT COMPLICATION, WITHOUT LONG-TERM CURRENT USE OF INSULIN (H): ICD-10-CM

## 2024-09-11 DIAGNOSIS — Z12.4 CERVICAL CANCER SCREENING: ICD-10-CM

## 2024-09-11 LAB
ALBUMIN SERPL BCG-MCNC: 4.7 G/DL (ref 3.5–5.2)
ALP SERPL-CCNC: 82 U/L (ref 40–150)
ALT SERPL W P-5'-P-CCNC: 36 U/L (ref 0–50)
ANION GAP SERPL CALCULATED.3IONS-SCNC: 13 MMOL/L (ref 7–15)
AST SERPL W P-5'-P-CCNC: 30 U/L (ref 0–45)
BILIRUB SERPL-MCNC: 0.3 MG/DL
BUN SERPL-MCNC: 13.1 MG/DL (ref 8–23)
CALCIUM SERPL-MCNC: 9.9 MG/DL (ref 8.8–10.4)
CHLORIDE SERPL-SCNC: 96 MMOL/L (ref 98–107)
CHOLEST SERPL-MCNC: 167 MG/DL
CREAT SERPL-MCNC: 0.78 MG/DL (ref 0.51–0.95)
CREAT UR-MCNC: 131 MG/DL
EGFRCR SERPLBLD CKD-EPI 2021: 84 ML/MIN/1.73M2
FASTING STATUS PATIENT QL REPORTED: ABNORMAL
FASTING STATUS PATIENT QL REPORTED: ABNORMAL
GLUCOSE SERPL-MCNC: 172 MG/DL (ref 70–99)
HBA1C MFR BLD: 6.7 % (ref 0–5.6)
HCO3 SERPL-SCNC: 29 MMOL/L (ref 22–29)
HDLC SERPL-MCNC: 37 MG/DL
LDLC SERPL CALC-MCNC: 91 MG/DL
MICROALBUMIN UR-MCNC: 23.7 MG/L
MICROALBUMIN/CREAT UR: 18.09 MG/G CR (ref 0–25)
NONHDLC SERPL-MCNC: 130 MG/DL
POTASSIUM SERPL-SCNC: 3.3 MMOL/L (ref 3.4–5.3)
PROT SERPL-MCNC: 7.6 G/DL (ref 6.4–8.3)
SODIUM SERPL-SCNC: 138 MMOL/L (ref 135–145)
TRIGL SERPL-MCNC: 195 MG/DL

## 2024-09-11 PROCEDURE — 87624 HPV HI-RISK TYP POOLED RSLT: CPT | Performed by: FAMILY MEDICINE

## 2024-09-11 PROCEDURE — 82043 UR ALBUMIN QUANTITATIVE: CPT | Performed by: FAMILY MEDICINE

## 2024-09-11 PROCEDURE — 36415 COLL VENOUS BLD VENIPUNCTURE: CPT | Performed by: FAMILY MEDICINE

## 2024-09-11 PROCEDURE — 80053 COMPREHEN METABOLIC PANEL: CPT | Performed by: FAMILY MEDICINE

## 2024-09-11 PROCEDURE — G0145 SCR C/V CYTO,THINLAYER,RESCR: HCPCS | Performed by: FAMILY MEDICINE

## 2024-09-11 PROCEDURE — 90673 RIV3 VACCINE NO PRESERV IM: CPT | Performed by: FAMILY MEDICINE

## 2024-09-11 PROCEDURE — 80061 LIPID PANEL: CPT | Performed by: FAMILY MEDICINE

## 2024-09-11 PROCEDURE — 83036 HEMOGLOBIN GLYCOSYLATED A1C: CPT | Performed by: FAMILY MEDICINE

## 2024-09-11 PROCEDURE — 99214 OFFICE O/P EST MOD 30 MIN: CPT | Mod: 25 | Performed by: FAMILY MEDICINE

## 2024-09-11 PROCEDURE — 90471 IMMUNIZATION ADMIN: CPT | Performed by: FAMILY MEDICINE

## 2024-09-11 PROCEDURE — 99396 PREV VISIT EST AGE 40-64: CPT | Mod: 25 | Performed by: FAMILY MEDICINE

## 2024-09-11 PROCEDURE — 82570 ASSAY OF URINE CREATININE: CPT | Performed by: FAMILY MEDICINE

## 2024-09-11 RX ORDER — HYDROCHLOROTHIAZIDE 50 MG/1
50 TABLET ORAL DAILY
Qty: 90 TABLET | Refills: 3 | Status: CANCELLED | OUTPATIENT
Start: 2024-09-11

## 2024-09-11 RX ORDER — DULAGLUTIDE 0.75 MG/.5ML
0.75 INJECTION, SOLUTION SUBCUTANEOUS
Qty: 6 ML | Refills: 1 | Status: SHIPPED | OUTPATIENT
Start: 2024-09-11

## 2024-09-11 ASSESSMENT — PATIENT HEALTH QUESTIONNAIRE - PHQ9
SUM OF ALL RESPONSES TO PHQ QUESTIONS 1-9: 3
SUM OF ALL RESPONSES TO PHQ QUESTIONS 1-9: 3
10. IF YOU CHECKED OFF ANY PROBLEMS, HOW DIFFICULT HAVE THESE PROBLEMS MADE IT FOR YOU TO DO YOUR WORK, TAKE CARE OF THINGS AT HOME, OR GET ALONG WITH OTHER PEOPLE: SOMEWHAT DIFFICULT

## 2024-09-11 ASSESSMENT — PAIN SCALES - GENERAL: PAINLEVEL: NO PAIN (0)

## 2024-09-11 NOTE — PROGRESS NOTES
"Preventive Care Visit  [facility]  Boris Mandel DO, Family Medicine  Sep 11, 2024      Assessment & Plan     Routine general medical examination at a health care facility    Encounter for vaccination  - INFLUENZA VACCINE TRIVALENT(FLUBLOK)    Cervical cancer screening  - HPV and Gynecologic Cytology Panel - Recommended Age 30-65 Years    Type 2 diabetes mellitus without complication, without long-term current use of insulin (H)  -Well controlled. Having some hypoglycemic episodes. Discussed decreasing dose of Trulicity to 0.75 mg weekly.  -Continue to monitor home BS.  - Hemoglobin A1c  - Albumin Random Urine Quantitative with Creat Ratio  - Comprehensive metabolic panel  - Lipid panel reflex to direct LDL Non-fasting  - [place] FOOT EXAM NO CHARGE  - dulaglutide (TRULICITY) 0.75 MG/0.5ML pen  Dispense: 6 mL; Refill: 1    Hypertension goal BP (blood pressure) < 140/90  -Well controlled, continue current medications  - Albumin Random Urine Quantitative with Creat Ratio  - Comprehensive metabolic panel    Hyperlipidemia with target LDL less than 130  -Stable, continue simvastatin   - Lipid panel reflex to direct LDL Non-fasting              BMI  Estimated body mass index is 27.9 kg/m  as calculated from the following:    Height as of this encounter: 1.595 m (5' 2.8\").    Weight as of this encounter: 71 kg (156 lb 8 oz).   Weight management plan: Discussed healthy diet and exercise guidelines    Counseling  Appropriate preventive services were addressed with this patient via screening, questionnaire, or discussion as appropriate for fall prevention, nutrition, physical activity, Tobacco-use cessation, social engagement, weight loss and cognition.  Checklist reviewing preventive services available has been given to the patient.  Reviewed patient's diet, addressing concerns and/or questions.   She is at risk for psychosocial distress and has been provided with information to reduce risk. "           Kasey Lindquist is a 64 year old, presenting for the following:  Physical (Pt will like to discuss about over the counter blood glucose machine ), Gyn Exam, and Imm/Inj (Flu/)        9/11/2024     3:47 PM   Additional Questions   Roomed by Claritza Coleman   Accompanied by Self        Via the Health Maintenance questionnaire, the patient has reported the following services have been completed -Eye Exam: [facility] 2024-07-17, this information has not been sent to the abstraction team.  Health Care Directive  Patient does not have a Health Care Directive or Living Will: Patient states has Advance Directive and will bring in a copy to clinic.    Other concerns:  DM2: only checks BS when not feeling well or feeling dizzy. Notes intermittently hypoglycemic to , makes her feel dizzy. Planning to get over the counter CGM.          9/11/2024   General Health   How would you rate your overall physical health? Good   Feel stress (tense, anxious, or unable to sleep) Only a little      (!) STRESS CONCERN      9/11/2024   Nutrition   Three or more servings of calcium each day? Yes   Diet: Low salt    Diabetic   How many servings of fruit and vegetables per day? (!) 2-3   How many sweetened beverages each day? 0-1       Multiple values from one day are sorted in reverse-chronological order         9/11/2024   Exercise   Days per week of moderate/strenous exercise 4 days   Average minutes spent exercising at this level 30 min            9/11/2024   Social Factors   Frequency of gathering with friends or relatives Once a week   Worry food won't last until get money to buy more No   Food not last or not have enough money for food? No   Do you have housing? (Housing is defined as stable permanent housing and does not include staying ouside in a car, in a tent, in an abandoned building, in an overnight shelter, or couch-surfing.) Yes   Are you worried about losing your housing? No   Lack of transportation? No    Unable to get utilities (heat,electricity)? No            9/11/2024   Fall Risk   Fallen 2 or more times in the past year? No   Trouble with walking or balance? No             9/11/2024   Dental   Dentist two times every year? Yes            9/11/2024   TB Screening   Were you born outside of the [place]? No          Today's PHQ-9 Score:       9/11/2024     3:12 PM   PHQ-9 SCORE   PHQ-9 Total Score MyChart 3 (Minimal depression)   PHQ-9 Total Score 3         9/11/2024   Substance Use   Alcohol more than 3/day or more than 7/wk No   Do you use any other substances recreationally? No        Social History     Tobacco Use    Smoking status: Passive Smoke Exposure - Never Smoker     Passive exposure: Yes    Smokeless tobacco: Never   Vaping Use    Vaping status: Never Used   Substance Use Topics    Alcohol use: Yes    Drug use: No           7/5/2022   LAST FHS-7 RESULTS   1st degree relative breast or ovarian cancer No   Any relative bilateral breast cancer No   Any male have breast cancer No   Any ONE woman have BOTH breast AND ovarian cancer No   Any woman with breast cancer before 50yrs No   2 or more relatives with breast AND/OR ovarian cancer No   2 or more relatives with breast AND/OR bowel cancer No                   9/11/2024   STI Screening   New sexual partner(s) since last STI/HIV test? No        History of abnormal Pap smear: No - age 30- 64 PAP with HPV every 5 years recommended        Latest Ref Rng & Units 4/23/2019     8:55 AM 4/23/2019     8:49 AM 12/29/2016    11:15 AM   PAP / HPV   PAP (Historical)   NIL     HPV 16 DNA NEG^Negative Negative   Negative    HPV 18 DNA NEG^Negative Negative   Negative    Other HR HPV NEG^Negative Negative   Negative      ASCVD Risk   The 10-year ASCVD risk score (Sarmad LOPEZ, et al., 2019) is: 12.7%    Values used to calculate the score:      Age: 64 years      Sex: Female      Is Non- : No      Diabetic: Yes      Tobacco smoker: No       "Systolic Blood Pressure: 126 mmHg      Is BP treated: Yes      HDL Cholesterol: 41 mg/dL      Total Cholesterol: 168 mg/dL           Reviewed and updated as needed this visit by Provider   Tobacco      Surg Hx  Fam Hx  Soc Hx Sexual Activity                   Objective    Exam  /74   Pulse 102   Temp 97.9  F (36.6  C) (Temporal)   Resp 16   Ht 1.595 m (5' 2.8\")   Wt 71 kg (156 lb 8 oz)   LMP  (LMP Unknown)   SpO2 97%   BMI 27.90 kg/m     Estimated body mass index is 27.9 kg/m  as calculated from the following:    Height as of this encounter: 1.595 m (5' 2.8\").    Weight as of this encounter: 71 kg (156 lb 8 oz).    Physical Exam  GENERAL: alert and no distress  EYES: Eyes grossly normal to inspection, PERRL and conjunctivae and sclerae normal  HENT: nose and mouth without ulcers or lesions  NECK: no adenopathy, no asymmetry, masses, or scars  RESP: lungs clear to auscultation - no rales, rhonchi or wheezes  CV: regular rate and rhythm, normal S1 S2, no S3 or S4, no murmur, click or rub, no peripheral edema  ABDOMEN: soft, nontender, no hepatosplenomegaly, no masses and bowel sounds normal   (female): normal female external genitalia, normal urethral meatus , normal vaginal mucosa, and normal cervix  MS: no gross musculoskeletal defects noted, no edema  SKIN: no suspicious lesions or rashes  NEURO: Normal strength and tone, mentation intact and speech normal  PSYCH: mentation appears normal, affect normal/bright        Signed Electronically by: Boris Mandel DO        "

## 2024-09-11 NOTE — PATIENT INSTRUCTIONS
Patient Education   Preventive Care Advice   This is general advice given by our system to help you stay healthy. However, your care team may have specific advice just for you. Please talk to your care team about your preventive care needs.  Nutrition  Eat 5 or more servings of fruits and vegetables each day.  Try wheat bread, brown rice and whole grain pasta (instead of white bread, rice, and pasta).  Get enough calcium and vitamin D. Check the label on foods and aim for 100% of the RDA (recommended daily allowance).  Lifestyle  Exercise at least 150 minutes each week  (30 minutes a day, 5 days a week).  Do muscle strengthening activities 2 days a week. These help control your weight and prevent disease.  No smoking.  Wear sunscreen to prevent skin cancer.  Have a dental exam and cleaning every 6 months.  Yearly exams  See your health care team every year to talk about:  Any changes in your health.  Any medicines your care team has prescribed.  Preventive care, family planning, and ways to prevent chronic diseases.  Shots (vaccines)   HPV shots (up to age 26), if you've never had them before.  Hepatitis B shots (up to age 59), if you've never had them before.  COVID-19 shot: Get this shot when it's due.  Flu shot: Get a flu shot every year.  Tetanus shot: Get a tetanus shot every 10 years.  Pneumococcal, hepatitis A, and RSV shots: Ask your care team if you need these based on your risk.  Shingles shot (for age 50 and up)  General health tests  Diabetes screening:  Starting at age 35, Get screened for diabetes at least every 3 years.  If you are younger than age 35, ask your care team if you should be screened for diabetes.  Cholesterol test: At age 39, start having a cholesterol test every 5 years, or more often if advised.  Bone density scan (DEXA): At age 50, ask your care team if you should have this scan for osteoporosis (brittle bones).  Hepatitis C: Get tested at least once in your life.  STIs (sexually  transmitted infections)  Before age 24: Ask your care team if you should be screened for STIs.  After age 24: Get screened for STIs if you're at risk. You are at risk for STIs (including HIV) if:  You are sexually active with more than one person.  You don't use condoms every time.  You or a partner was diagnosed with a sexually transmitted infection.  If you are at risk for HIV, ask about PrEP medicine to prevent HIV.  Get tested for HIV at least once in your life, whether you are at risk for HIV or not.  Cancer screening tests  Cervical cancer screening: If you have a cervix, begin getting regular cervical cancer screening tests starting at age 21.  Breast cancer scan (mammogram): If you've ever had breasts, begin having regular mammograms starting at age 40. This is a scan to check for breast cancer.  Colon cancer screening: It is important to start screening for colon cancer at age 45.  Have a colonoscopy test every 10 years (or more often if you're at risk) Or, ask your provider about stool tests like a FIT test every year or Cologuard test every 3 years.  To learn more about your testing options, visit:   .  For help making a decision, visit:   https://bit.ly/ou26230.  Prostate cancer screening test: If you have a prostate, ask your care team if a prostate cancer screening test (PSA) at age 55 is right for you.  Lung cancer screening: If you are a current or former smoker ages 50 to 80, ask your care team if ongoing lung cancer screenings are right for you.  For informational purposes only. Not to replace the advice of your health care provider. Copyright   2023 [employer]. All rights reserved. Clinically reviewed by the [facility]. [employer] [id number] - REV 01/24.

## 2024-09-11 NOTE — NURSING NOTE
Prior to immunization administration, verified patients identity using patient s name and date of birth. Please see Immunization Activity for additional information.     Screening Questionnaire for Adult Immunization    Are you sick today?   No   Do you have allergies to medications, food, a vaccine component or latex?   No   Have you ever had a serious reaction after receiving a vaccination?   No   Do you have a long-term health problem with heart, lung, kidney, or metabolic disease (e.g., diabetes), asthma, a blood disorder, no spleen, complement component deficiency, a cochlear implant, or a spinal fluid leak?  Are you on long-term aspirin therapy?   No   Do you have cancer, leukemia, HIV/AIDS, or any other immune system problem?   No   Do you have a parent, brother, or sister with an immune system problem?   No   In the past 3 months, have you taken medications that affect  your immune system, such as prednisone, other steroids, or anticancer drugs; drugs for the treatment of rheumatoid arthritis, Crohn s disease, or psoriasis; or have you had radiation treatments?   No   Have you had a seizure, or a brain or other nervous system problem?   No   During the past year, have you received a transfusion of blood or blood    products, or been given immune (gamma) globulin or antiviral drug?   No   For women: Are you pregnant or is there a chance you could become       pregnant during the next month?   No   Have you received any vaccinations in the past 4 weeks?   No     Immunization questionnaire answers were all negative.      Patient instructed to remain in clinic for 15 minutes afterwards, and to report any adverse reactions.     Screening performed by Slime Wakefield on 9/11/2024 at 4:15 PM.

## 2024-09-12 LAB
HPV HR 12 DNA CVX QL NAA+PROBE: NEGATIVE
HPV16 DNA CVX QL NAA+PROBE: NEGATIVE
HPV18 DNA CVX QL NAA+PROBE: NEGATIVE
HUMAN PAPILLOMA VIRUS FINAL DIAGNOSIS: NORMAL

## 2024-09-16 LAB
BKR AP ASSOCIATED HPV REPORT: NORMAL
BKR LAB AP GYN ADEQUACY: NORMAL
BKR LAB AP GYN INTERPRETATION: NORMAL
BKR LAB AP LMP: NORMAL
BKR LAB AP PREVIOUS ABNORMAL: NORMAL
PATH REPORT.COMMENTS IMP SPEC: NORMAL
PATH REPORT.COMMENTS IMP SPEC: NORMAL
PATH REPORT.RELEVANT HX SPEC: NORMAL

## 2024-09-18 DIAGNOSIS — E87.6 HYPOKALEMIA: Primary | ICD-10-CM

## 2024-12-16 DIAGNOSIS — Z12.11 COLON CANCER SCREENING: ICD-10-CM

## 2024-12-17 ENCOUNTER — MYC MEDICAL ADVICE (OUTPATIENT)
Dept: FAMILY MEDICINE | Facility: CLINIC | Age: 64
End: 2024-12-17
Payer: COMMERCIAL

## 2024-12-19 NOTE — TELEPHONE ENCOUNTER
Dr. Mandel's Office Visit note 9/11/2024.   Type 2 diabetes mellitus without complication, without long-term current use of insulin (H)  -Well controlled. Having some hypoglycemic episodes. Discussed decreasing dose of Trulicity to 0.75 mg weekly.    Writer responded via ShareWithUhart.    RITO CheryN RN  [facility]

## 2025-01-11 ENCOUNTER — HEALTH MAINTENANCE LETTER (OUTPATIENT)
Age: 65
End: 2025-01-11

## 2025-01-28 ENCOUNTER — DOCUMENTATION ONLY (OUTPATIENT)
Dept: LAB | Facility: CLINIC | Age: 65
End: 2025-01-28
Payer: COMMERCIAL

## 2025-01-28 NOTE — PROGRESS NOTES
Inger A Wegener has an upcoming lab appointment:    Future Appointments   Date Time Provider Department Center   1/31/2025  2:45 PM [facility]     Patient is scheduled for the following lab(s): A1C    There is no order available. Please review and place either future orders or HMPO (Review of Health Maintenance Protocol Orders), as appropriate.    Health Maintenance Due   Topic    ANNUAL REVIEW OF HM ORDERS     A1C      MARIA DEL CARMEN Garza

## 2025-02-11 ENCOUNTER — E-VISIT (OUTPATIENT)
Dept: URGENT CARE | Facility: CLINIC | Age: 65
End: 2025-02-11
Payer: COMMERCIAL

## 2025-02-11 DIAGNOSIS — J01.90 ACUTE SINUSITIS, RECURRENCE NOT SPECIFIED, UNSPECIFIED LOCATION: Primary | ICD-10-CM

## 2025-02-11 NOTE — PATIENT INSTRUCTIONS
Dear Inger A Wegener    After reviewing your responses, I've been able to diagnose you with Acute sinusitis, recurrence not specified, unspecified location.      Based on your responses and diagnosis, I have prescribed   Orders Placed This Encounter   Medications     amoxicillin-clavulanate (AUGMENTIN) 875-125 MG tablet     Sig: Take 1 tablet by mouth 2 times daily for 7 days.     Dispense:  14 tablet     Refill:  0      to treat your symptoms. I have sent this to your pharmacy.?     It is also important to stay well hydrated, get lots of rest and take over-the-counter decongestants,?tylenol?or ibuprofen if you?are able to?take those medications per your primary care provider to help relieve discomfort.?     It is important that you take?all of?your prescribed medication even if your symptoms are improving after a few doses.? Taking?all of?your medicine helps prevent the symptoms from returning.?     If your symptoms worsen, you develop severe headache, vomiting, high fever (>102), or are not improving in 7 days, please contact your primary care provider for an appointment or visit any of our convenient Walk-in Care or Urgent Care Centers to be seen which can be found on our website?here.?     Thanks again for choosing?us?as your health care partner,?   ?  Amberly Hightower MD?   Thank you for choosing us for your care. I have placed an order for a prescription so that you can start treatment. View your full visit summary for details by clicking on the link below. Your pharmacist will able to address any questions you may have about the medication.     Augmentin is prescribed which is a strong antibiotic recommended for sinus infections.  Strong antibiotics can make people prone to antibiotic associated diarrhea - one of which is Clostridium Difficile.  If you develop diarrhea- please stop the antibiotic and consult with your primary care provider    If you're not feeling better within 5-7 days, please  schedule an appointment.  You can schedule an appointment right here in [facility], or call [phone]  If the visit is for the same symptoms as your eVisit, we'll refund the cost of your eVisit if seen within seven days.

## 2025-02-17 DIAGNOSIS — E11.9 TYPE 2 DIABETES MELLITUS WITHOUT COMPLICATION, WITHOUT LONG-TERM CURRENT USE OF INSULIN (H): ICD-10-CM

## 2025-02-17 DIAGNOSIS — F43.23 ADJUSTMENT DISORDER WITH MIXED ANXIETY AND DEPRESSED MOOD: ICD-10-CM

## 2025-02-17 DIAGNOSIS — E11.65 TYPE 2 DIABETES MELLITUS WITH HYPERGLYCEMIA, WITHOUT LONG-TERM CURRENT USE OF INSULIN (H): ICD-10-CM

## 2025-02-17 DIAGNOSIS — I10 HYPERTENSION GOAL BP (BLOOD PRESSURE) < 140/90: ICD-10-CM

## 2025-02-17 DIAGNOSIS — E78.5 HYPERLIPIDEMIA WITH TARGET LDL LESS THAN 130: ICD-10-CM

## 2025-02-17 RX ORDER — DULAGLUTIDE 0.75 MG/.5ML
INJECTION, SOLUTION SUBCUTANEOUS
Qty: 6 ML | Refills: 0 | Status: SHIPPED | OUTPATIENT
Start: 2025-02-17

## 2025-02-17 RX ORDER — SIMVASTATIN 20 MG
TABLET ORAL
Qty: 90 TABLET | Refills: 1 | Status: SHIPPED | OUTPATIENT
Start: 2025-02-17

## 2025-02-17 RX ORDER — METFORMIN HYDROCHLORIDE 500 MG/1
TABLET, EXTENDED RELEASE ORAL
Qty: 360 TABLET | Refills: 0 | Status: SHIPPED | OUTPATIENT
Start: 2025-02-17

## 2025-02-17 RX ORDER — HYDROCHLOROTHIAZIDE 50 MG/1
50 TABLET ORAL DAILY
Qty: 90 TABLET | Refills: 1 | Status: SHIPPED | OUTPATIENT
Start: 2025-02-17

## 2025-02-17 RX ORDER — VENLAFAXINE HYDROCHLORIDE 75 MG/1
75 CAPSULE, EXTENDED RELEASE ORAL DAILY
Qty: 90 CAPSULE | Refills: 0 | Status: SHIPPED | OUTPATIENT
Start: 2025-02-17

## 2025-03-07 ENCOUNTER — MYC MEDICAL ADVICE (OUTPATIENT)
Dept: FAMILY MEDICINE | Facility: CLINIC | Age: 65
End: 2025-03-07
Payer: COMMERCIAL

## 2025-03-10 ASSESSMENT — ANXIETY QUESTIONNAIRES
6. BECOMING EASILY ANNOYED OR IRRITABLE: NEARLY EVERY DAY
5. BEING SO RESTLESS THAT IT IS HARD TO SIT STILL: MORE THAN HALF THE DAYS
2. NOT BEING ABLE TO STOP OR CONTROL WORRYING: NEARLY EVERY DAY
4. TROUBLE RELAXING: MORE THAN HALF THE DAYS
1. FEELING NERVOUS, ANXIOUS, OR ON EDGE: MORE THAN HALF THE DAYS
GAD7 TOTAL SCORE: 15
7. FEELING AFRAID AS IF SOMETHING AWFUL MIGHT HAPPEN: NOT AT ALL
3. WORRYING TOO MUCH ABOUT DIFFERENT THINGS: NEARLY EVERY DAY
IF YOU CHECKED OFF ANY PROBLEMS ON THIS QUESTIONNAIRE, HOW DIFFICULT HAVE THESE PROBLEMS MADE IT FOR YOU TO DO YOUR WORK, TAKE CARE OF THINGS AT HOME, OR GET ALONG WITH OTHER PEOPLE: VERY DIFFICULT
8. IF YOU CHECKED OFF ANY PROBLEMS, HOW DIFFICULT HAVE THESE MADE IT FOR YOU TO DO YOUR WORK, TAKE CARE OF THINGS AT HOME, OR GET ALONG WITH OTHER PEOPLE?: VERY DIFFICULT
7. FEELING AFRAID AS IF SOMETHING AWFUL MIGHT HAPPEN: NOT AT ALL

## 2025-03-10 ASSESSMENT — PATIENT HEALTH QUESTIONNAIRE - PHQ9
SUM OF ALL RESPONSES TO PHQ QUESTIONS 1-9: 18
10. IF YOU CHECKED OFF ANY PROBLEMS, HOW DIFFICULT HAVE THESE PROBLEMS MADE IT FOR YOU TO DO YOUR WORK, TAKE CARE OF THINGS AT HOME, OR GET ALONG WITH OTHER PEOPLE: VERY DIFFICULT
SUM OF ALL RESPONSES TO PHQ QUESTIONS 1-9: 18

## 2025-03-10 NOTE — TELEPHONE ENCOUNTER
Writer responded via [employer].  RITO MarcanoN, RN-BC  [facility]

## 2025-03-11 ENCOUNTER — VIRTUAL VISIT (OUTPATIENT)
Dept: FAMILY MEDICINE | Facility: CLINIC | Age: 65
End: 2025-03-11
Payer: COMMERCIAL

## 2025-03-11 DIAGNOSIS — L01.00 IMPETIGO: ICD-10-CM

## 2025-03-11 DIAGNOSIS — L01.00 IMPETIGO: Primary | ICD-10-CM

## 2025-03-11 DIAGNOSIS — E11.9 TYPE 2 DIABETES MELLITUS WITHOUT COMPLICATION, WITHOUT LONG-TERM CURRENT USE OF INSULIN (H): ICD-10-CM

## 2025-03-11 DIAGNOSIS — F43.23 ADJUSTMENT DISORDER WITH MIXED ANXIETY AND DEPRESSED MOOD: ICD-10-CM

## 2025-03-11 PROCEDURE — 98006 SYNCH AUDIO-VIDEO EST MOD 30: CPT | Performed by: FAMILY MEDICINE

## 2025-03-11 RX ORDER — MUPIROCIN 20 MG/G
OINTMENT TOPICAL 2 TIMES DAILY
Qty: 10 G | Refills: 1 | Status: SHIPPED | OUTPATIENT
Start: 2025-03-11

## 2025-03-11 RX ORDER — MUPIROCIN 20 MG/G
OINTMENT TOPICAL 2 TIMES DAILY
Qty: 10 G | Refills: 1 | Status: SHIPPED | OUTPATIENT
Start: 2025-03-11 | End: 2025-03-11

## 2025-03-11 NOTE — LETTER
3/11/2025    Inger A Wegener   1960        To Whom it May Concern;    Inger A Wegener was seen for a healthcare visit on Mar 11, 2025. She may return to work on 2024 with no restrictions.      Sincerely,        Diane Stein MD

## 2025-03-11 NOTE — TELEPHONE ENCOUNTER
Pharmacy was out of stock so they cancelled rx, unable to transfer. Please resend to pended pharm-thanks!

## 2025-03-11 NOTE — PROGRESS NOTES
"Ameena is a 64 year old who is being evaluated via a billable video visit.    How would you like to obtain your AVS? MyChart  If the video visit is dropped, the invitation should be resent by: Text to cell phone: [phone]  Will anyone else be joining your video visit? No      Assessment & Plan     (L01.00) Impetigo  (primary encounter diagnosis)  New diagnosis, although I suspect that's what she had several weeks ago as well. See medication orders and AVS for patient instructions.  Plan: mupirocin (BACTROBAN) 2 % external ointment         (E11.9) Type 2 diabetes mellitus without complication, without long-term current use of insulin (H)  Comment: patient reports recently worsening blood sugars with generalized constitutional symptoms, due to high degree of stress.    (F43.23) Adjustment disorder with mixed anxiety and depressed mood  Comment: acute, severe worsening.  Plan: recommended time off work for rest and recovery, return to clinic 3/24/25, follow up if symptoms worsen or needs more intervention or time off.    BMI  Estimated body mass index is 27.9 kg/m  as calculated from the following:    Height as of 9/11/24: 1.595 m (5' 2.8\").    Weight as of 9/11/24: 71 kg (156 lb 8 oz).   Weight management plan: she has been losing weight with previously good control of diabetes.  Wt Readings from Last 4 Encounters:   09/11/24 71 kg (156 lb 8 oz)   03/19/24 74.4 kg (164 lb 1.6 oz)   12/19/23 77.5 kg (170 lb 12.8 oz)   10/01/23 77.1 kg (170 lb)       Depression Screening Follow Up        3/10/2025    10:26 AM   PHQ   PHQ-9 Total Score 18    Q9: Thoughts of better off dead/self-harm past 2 weeks Not at all       Patient-reported     Follow Up Actions Taken  See plan above        Subjective   Ameena is a 64 year old, presenting for the following health issues:  Nose sore  She's had a sore just inside her right nares, describes as \"extraordinarily\" painful, associated with toothaches and headaches. She had a similar " "episode several weeks ago that was treated with oral antibiotics.    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    Follow Up and Patient Request (Swelling, painful, inflammation on the nose, had before 3-4 wks ago on the opposite nostril, was Rx'd antibiotics, now re-occurred, pimple like, not going away. )        3/11/2025     8:10 AM   Additional Questions   Roomed by SANDRA Story   Accompanied by Self-virtually     History of Present Illness       Mental Health Follow-up:  Patient presents to follow-up on Anxiety.    Patient's anxiety since last visit has been:  Worse  The patient is having other symptoms associated with anxiety.  Any significant life events: No  Patient is feeling anxious or having panic attacks.  Patient has no concerns about alcohol or drug use.    Diabetes:   She presents for follow up of diabetes.  She is checking home blood glucose a few times a month.   She checks blood glucose at bedtime.  Blood glucose is never over 200 and never under 70. She is aware of hypoglycemia symptoms including dizziness, weakness, lethargy and confusion.   She is concerned about frequent infections.    She is not experiencing numbness or burning in feet, excessive thirst, blurry vision, weight changes or redness, sores or blisters on feet.           Reason for visit:  1. Medical excuse from work  2. infection on nose    She eats 0-1 servings of fruits and vegetables daily.She consumes 1 sweetened beverage(s) daily.She exercises with enough effort to increase her heart rate 20 to 29 minutes per day.  She exercises with enough effort to increase her heart rate 4 days per week.   She is taking medications regularly.      Depression and Anxiety   How are you doing with your depression since your last visit? 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  How are you doing with your anxiety since your last visit?  Worsened see above  Are you having other symptoms that might be associated with depression or anxiety? Yes:  difficulty concentrating, irritable  Do you have any concerns with your use of alcohol or other drugs? No    Social History     Tobacco Use    Smoking status: Passive Smoke Exposure - Never Smoker     Passive exposure: Yes    Smokeless tobacco: Never   Vaping Use    Vaping status: Never Used   Substance Use Topics    Alcohol use: Yes    Drug use: No         12/19/2023     2:43 PM 9/11/2024     3:12 PM 3/10/2025    10:26 AM   PHQ   PHQ-9 Total Score 1 3 18    Q9: Thoughts of better off dead/self-harm past 2 weeks Not at all Not at all Not at all       Patient-reported         12/19/2023     2:46 PM 1/15/2024     9:56 AM 3/10/2025    10:30 AM   DORIS-7 SCORE   Total Score 3 (minimal anxiety) 3 (minimal anxiety) 15 (severe anxiety)   Total Score 3 3 15        Patient-reported       Suicide Assessment Five-step Evaluation and Treatment (SAFE-T)    Diabetes Follow-up    How often are you checking your blood sugar? Not recently , needs new batteries for glucometer  What concerns do you have today about your diabetes? Other: higher blood sugars   Do you have any of these symptoms? (Select all that apply)  No numbness or tingling in feet.  No redness, sores or blisters on feet.  No complaints of excessive thirst.  No reports of blurry vision.  No significant changes to weight.      BP Readings from Last 2 Encounters:   09/11/24 126/74   03/19/24 128/74     Hemoglobin A1C (%)   Date Value   01/31/2025 7.1 (H)   09/11/2024 6.7 (H)   01/22/2021 9.0 (H)   10/23/2020 9.1 (H)     LDL Cholesterol Calculated (mg/dL)   Date Value   09/11/2024 91   12/19/2023 84   01/22/2021 96   04/20/2020 96     Hypertension Follow-up    Do you check your blood pressure regularly outside of the clinic?  Yes   Are you following a low salt diet? Yes  Are your blood pressures ever more than 140 on the top number (systolic) OR more   than 90 on the bottom number (diastolic), for example 140/90? Yes recently have been higher due to stress      Review of Systems  Constitutional, HEENT, cardiovascular, pulmonary, gi and gu systems are negative, except as otherwise noted.      Objective    Vitals - Patient Reported  Systolic (Patient Reported):  (unknown)  Diastolic (Patient Reported):  (unknown)  Weight (Patient Reported):  (unknown)      Vitals:  No vitals were obtained today due to virtual visit.    Physical Exam   GENERAL: alert and no distress  EYES: Eyes grossly normal to inspection.  No discharge or erythema, or obvious scleral/conjunctival abnormalities.  RESP: No audible wheeze, cough, or visible cyanosis.    SKIN: Visible skin clear. No significant rash, abnormal pigmentation or lesions.  NEURO: Cranial nerves grossly intact.  Mentation and speech appropriate for age.  PSYCH: Appropriate affect, tone, and pace of words  PSYCH: mentation appears normal and sad and anxious appearing      Video-Visit Details    Type of service:  Video Visit   Originating Location (pt. Location): Home    Distant Location (provider location):  Off-site  Platform used for Video Visit: Olaf  Signed Electronically by: Diane Stein MD

## 2025-05-11 ENCOUNTER — HEALTH MAINTENANCE LETTER (OUTPATIENT)
Age: 65
End: 2025-05-11

## 2025-05-20 DIAGNOSIS — E11.65 TYPE 2 DIABETES MELLITUS WITH HYPERGLYCEMIA, WITHOUT LONG-TERM CURRENT USE OF INSULIN (H): ICD-10-CM

## 2025-05-20 RX ORDER — METFORMIN HYDROCHLORIDE 500 MG/1
1000 TABLET, EXTENDED RELEASE ORAL
Qty: 360 TABLET | Refills: 0 | Status: SHIPPED | OUTPATIENT
Start: 2025-05-20

## 2025-05-29 DIAGNOSIS — E11.9 TYPE 2 DIABETES MELLITUS WITHOUT COMPLICATION, WITHOUT LONG-TERM CURRENT USE OF INSULIN (H): ICD-10-CM

## 2025-05-29 DIAGNOSIS — F43.23 ADJUSTMENT DISORDER WITH MIXED ANXIETY AND DEPRESSED MOOD: ICD-10-CM

## 2025-05-29 RX ORDER — DULAGLUTIDE 0.75 MG/.5ML
INJECTION, SOLUTION SUBCUTANEOUS
Qty: 6 ML | Refills: 0 | Status: SHIPPED | OUTPATIENT
Start: 2025-05-29

## 2025-06-02 RX ORDER — VENLAFAXINE HYDROCHLORIDE 75 MG/1
75 CAPSULE, EXTENDED RELEASE ORAL DAILY
Qty: 90 CAPSULE | Refills: 1 | Status: SHIPPED | OUTPATIENT
Start: 2025-06-02

## 2025-06-18 ENCOUNTER — TELEPHONE (OUTPATIENT)
Dept: FAMILY MEDICINE | Facility: CLINIC | Age: 65
End: 2025-06-18
Payer: COMMERCIAL

## 2025-06-18 DIAGNOSIS — U07.1 INFECTION DUE TO 2019 NOVEL CORONAVIRUS: Primary | ICD-10-CM

## 2025-06-18 NOTE — TELEPHONE ENCOUNTER
RN COVID TREATMENT VISIT  06/18/25    The patient has been triaged and does not require a higher level of care.    Ameena Velascogener  65 year old  Current weight? 156#    Has the patient been seen by a primary care or specialty provider at a [facility] within the past three years? Yes.   Have you been in close proximity to/do you have a known exposure to a person with a confirmed case of influenza? No.     General treatment eligibility:  Date of positive COVID test (PCR or at home)?  6/18/25    Are you or have you been hospitalized for this COVID-19 infection? No.   Have you received monoclonal antibodies or antiviral treatment for COVID-19 since this positive test? No.   Do you have any of the following conditions that place you at risk of being very sick from COVID-19?   - Age 50 or older  - Diabetes (type 1 or type 2)  - Mood disorders, including depression and schizophrenia spectrum disorders   Yes, patient has at least one high risk condition as noted above.     Current COVID symptoms:   - cough  - fatigue  - muscle or body aches  - headache  - congestion or runny nose  Yes. Patient has at least one symptom as selected.     How many days since symptoms started? 5 days or less. Established patient, 12 years or older weighing at least 88.2 lbs, who has symptoms that started in the past 5 days, has not been hospitalized nor received treatment already, and is at risk for being very sick from COVID-19.     Treatment eligibility by RN:  Are you currently pregnant or nursing? No  Do you have a clinically significant hypersensitivity to nirmatrelvir or ritonavir, or toxic epidermal necrolysis (TEN) or Jamison-Ashkan Syndrome? No  Do you have a history of hepatitis, any hepatic impairment on the Problem List (such as Child-Roca Class C, cirrhosis, fatty liver disease, alcoholic liver disease), or was the last liver lab (hepatic panel, ALT, AST, ALK Phos, bilirubin) elevated in the past 6 months?  No  Do you have any history of severe renal impairment (eGFR < 30mL/min)? No    Is patient eligible to continue? Yes, patient meets all eligibility requirements for the RN COVID treatment (as denoted by all no responses above).     Current Outpatient Medications   Medication Sig Dispense Refill    ASPIRIN NOT PRESCRIBED (INTENTIONAL) continuous prn for other Reported on 3/8/2017 (Patient not taking: Reported on 9/11/2024)      blood glucose (ACCU-CHEK FASTCLIX) lancing device Device to be used with lancets. 1 each 0    blood glucose (ACCU-CHEK SMARTVIEW) test strip Use to test blood sugar 2 times daily or as directed.  Ok to substitute alternative if insurance prefers. 100 strip 1    blood glucose monitoring (ACCU-CHEK FASTCLIX) lancets Use to test blood sugar 2 times daily or as directed.  Ok to substitute alternative if insurance prefers. 300 each 3    Calcium Carb-Cholecalciferol (CALCIUM CARBONATE-VITAMIN D3) 600-400 MG-UNIT TABS Take 1 tablet by mouth daily      Cholecalciferol (VITAMIN D3) 2000 units CAPS Take 1 capsule by mouth daily      cyanocolbalamin (VITAMIN  B-12) 500 MCG tablet Take 500 mcg by mouth daily      famotidine (PEPCID) 20 MG tablet Take 1 tablet (20 mg) by mouth 2 times daily as needed (indigestion) 90 tablet 3    fluticasone (FLONASE) 50 MCG/ACT nasal spray Spray 2 sprays into both nostrils daily 48 mL 3    hydrochlorothiazide (HYDRODIURIL) 50 MG tablet TAKE ONE TABLET BY MOUTH ONCE DAILY 90 tablet 1    metFORMIN (GLUCOPHAGE XR) 500 MG 24 hr tablet TAKE TWO TABLETS BY MOUTH TWICE A  tablet 0    mupirocin (BACTROBAN) 2 % external ointment Apply topically 2 times daily. 10 g 1    simvastatin (ZOCOR) 20 MG tablet TAKE ONE TABLET BY MOUTH EVERY EVENING AT BEDTIME 90 tablet 1    TRULICITY 0.75 MG/0.5ML pen INJECT 0.75 MG UNDER THE SKIN EVERY 7 DAYS. 6 mL 0    venlafaxine (EFFEXOR XR) 75 MG 24 hr capsule TAKE ONE CAPSULE BY MOUTH ONCE DAILY 90 capsule 1       Medications from List 1 of the  standing order (on medications that exclude the use of Paxlovid) that patient is taking: NONE.   Is patient taking any meds from List 1? No.   Medications from List 2 of the standing order (on meds that provider needs to adjust) that patient is taking: NONE. Is patient on any of the meds from List 2? No.   Medications from List 3 of standing order (on meds that a RN needs to adjust) that patient is taking: simvastatin (Zocor, FloLipid): Instructed patient to stop taking simvastatin while taking Paxlovid and first dose of Paxlovid must be at least 12 hours after last dose of simvastatin.  Instructed to restart simvastatin 5 days after the completion of Paxlovid.  Is patient on any meds from List 3? Yes. Patient is on meds from list 3. No meds require a provider visit and at least one med required RN to adjust.     Paxlovid has an approximate 90% reduction in hospitalization. Paxlovid can possibly cause altered sense of taste, diarrhea (loose, watery stools), high blood pressure, muscle aches.     Would patient like a Paxlovid prescription?   Yes.   Lab Results   Component Value Date    GFRESTIMATED 84 09/11/2024       Was last eGFR reduced? No, eGFR 60 or greater/ No Result on record. Patient can receive the normal renal function dose. Paxlovid Rx sent to [facility].    Temporary change to home medications: will hold simvastatin    All medication adjustments (holds, etc) were discussed with the patient and patient was asked to repeat back (teachback) their med adjustment.  Did patient understand med adjustment? Yes, patient repeated back and understood correctly.    Reviewed the following instructions with the patient:    Paxlovid (nimatrelvir and ritonavir)    How it works  Two medicines (nirmatrelvir and ritonavir) are taken together. They stop the virus from growing. Less amount of virus is easier for your body to fight.    How to take  Medicine comes in a daily container with both medicine tablets. Take by mouth  twice daily (once in the morning, once at night) for 5 days.  The number of tablets to take varies by patient.  Don't chew or break capsules. Swallow whole.    When to take  Take as soon as possible after positive COVID-19 test result, and within 5 days of your first symptoms.    Possible side effects  Can cause altered sense of taste, diarrhea (loose, watery stools), high blood pressure, muscle aches.    Jyotsna RUSSELL BSN, PHN, RN-[facility]  [phone]

## 2025-07-21 ENCOUNTER — PATIENT OUTREACH (OUTPATIENT)
Dept: CARE COORDINATION | Facility: CLINIC | Age: 65
End: 2025-07-21
Payer: COMMERCIAL

## 2025-08-12 ENCOUNTER — PATIENT OUTREACH (OUTPATIENT)
Dept: CARE COORDINATION | Facility: CLINIC | Age: 65
End: 2025-08-12
Payer: COMMERCIAL

## 2025-08-13 DIAGNOSIS — E78.5 HYPERLIPIDEMIA WITH TARGET LDL LESS THAN 130: ICD-10-CM

## 2025-08-13 DIAGNOSIS — E11.9 TYPE 2 DIABETES MELLITUS WITHOUT COMPLICATION, WITHOUT LONG-TERM CURRENT USE OF INSULIN (H): ICD-10-CM

## 2025-08-13 DIAGNOSIS — I10 HYPERTENSION GOAL BP (BLOOD PRESSURE) < 140/90: ICD-10-CM

## 2025-08-13 DIAGNOSIS — E11.65 TYPE 2 DIABETES MELLITUS WITH HYPERGLYCEMIA, WITHOUT LONG-TERM CURRENT USE OF INSULIN (H): ICD-10-CM

## 2025-08-14 DIAGNOSIS — E11.65 TYPE 2 DIABETES MELLITUS WITH HYPERGLYCEMIA, WITHOUT LONG-TERM CURRENT USE OF INSULIN (H): ICD-10-CM

## 2025-08-14 RX ORDER — DULAGLUTIDE 0.75 MG/.5ML
INJECTION, SOLUTION SUBCUTANEOUS
Qty: 6 ML | Refills: 0 | Status: SHIPPED | OUTPATIENT
Start: 2025-08-14

## 2025-08-14 RX ORDER — SIMVASTATIN 20 MG
TABLET ORAL
Qty: 90 TABLET | Refills: 1 | Status: SHIPPED | OUTPATIENT
Start: 2025-08-14

## 2025-08-14 RX ORDER — HYDROCHLOROTHIAZIDE 50 MG/1
50 TABLET ORAL DAILY
Qty: 90 TABLET | Refills: 0 | Status: SHIPPED | OUTPATIENT
Start: 2025-08-14

## 2025-08-14 RX ORDER — METFORMIN HYDROCHLORIDE 500 MG/1
1000 TABLET, EXTENDED RELEASE ORAL
Qty: 360 TABLET | Refills: 0 | Status: CANCELLED | OUTPATIENT
Start: 2025-08-14

## 2025-08-14 RX ORDER — METFORMIN HYDROCHLORIDE 500 MG/1
1000 TABLET, EXTENDED RELEASE ORAL
Qty: 360 TABLET | Refills: 0 | Status: SHIPPED | OUTPATIENT
Start: 2025-08-14

## 2025-08-22 DIAGNOSIS — E11.65 TYPE 2 DIABETES MELLITUS WITH HYPERGLYCEMIA, WITHOUT LONG-TERM CURRENT USE OF INSULIN (H): ICD-10-CM

## 2025-08-23 RX ORDER — METFORMIN HYDROCHLORIDE 500 MG/1
1000 TABLET, EXTENDED RELEASE ORAL
Qty: 360 TABLET | Refills: 0 | OUTPATIENT
Start: 2025-08-23

## 2025-08-24 ENCOUNTER — HEALTH MAINTENANCE LETTER (OUTPATIENT)
Age: 65
End: 2025-08-24